# Patient Record
Sex: MALE | Race: BLACK OR AFRICAN AMERICAN | NOT HISPANIC OR LATINO | Employment: OTHER | URBAN - METROPOLITAN AREA
[De-identification: names, ages, dates, MRNs, and addresses within clinical notes are randomized per-mention and may not be internally consistent; named-entity substitution may affect disease eponyms.]

---

## 2021-12-15 ENCOUNTER — OFFICE VISIT (OUTPATIENT)
Dept: ENDOCRINOLOGY | Facility: CLINIC | Age: 61
End: 2021-12-15

## 2021-12-15 VITALS
WEIGHT: 302 LBS | HEIGHT: 72 IN | BODY MASS INDEX: 40.9 KG/M2 | SYSTOLIC BLOOD PRESSURE: 128 MMHG | HEART RATE: 78 BPM | DIASTOLIC BLOOD PRESSURE: 78 MMHG | OXYGEN SATURATION: 98 %

## 2021-12-15 DIAGNOSIS — E11.65 TYPE 2 DIABETES MELLITUS WITH HYPERGLYCEMIA, WITHOUT LONG-TERM CURRENT USE OF INSULIN (HCC): Primary | ICD-10-CM

## 2021-12-15 PROBLEM — I10 ESSENTIAL HYPERTENSION: Status: ACTIVE | Noted: 2017-04-28

## 2021-12-15 PROBLEM — F32.A DEPRESSIVE DISORDER: Status: ACTIVE | Noted: 2017-07-28

## 2021-12-15 PROBLEM — E66.01 MORBID OBESITY: Status: ACTIVE | Noted: 2018-04-13

## 2021-12-15 PROBLEM — G89.4 CHRONIC PAIN DISORDER: Status: ACTIVE | Noted: 2017-04-28

## 2021-12-15 LAB
EXPIRATION DATE: NORMAL
GLUCOSE BLDC GLUCOMTR-MCNC: 278 MG/DL (ref 70–130)
HBA1C MFR BLD: 7.8 %
Lab: NORMAL

## 2021-12-15 PROCEDURE — 82947 ASSAY GLUCOSE BLOOD QUANT: CPT | Performed by: INTERNAL MEDICINE

## 2021-12-15 PROCEDURE — 99204 OFFICE O/P NEW MOD 45 MIN: CPT | Performed by: INTERNAL MEDICINE

## 2021-12-15 PROCEDURE — 83036 HEMOGLOBIN GLYCOSYLATED A1C: CPT | Performed by: INTERNAL MEDICINE

## 2021-12-15 RX ORDER — CHOLECALCIFEROL (VITAMIN D3) 1250 MCG
CAPSULE ORAL
COMMUNITY

## 2021-12-15 RX ORDER — FLUTICASONE PROPIONATE 50 MCG
SPRAY, SUSPENSION (ML) NASAL
COMMUNITY

## 2021-12-15 RX ORDER — SEMAGLUTIDE 1.34 MG/ML
0.5 INJECTION, SOLUTION SUBCUTANEOUS WEEKLY
Qty: 1 PEN | Refills: 0
Start: 2021-12-15 | End: 2022-01-05 | Stop reason: SDUPTHER

## 2021-12-15 RX ORDER — METOPROLOL SUCCINATE 100 MG/1
TABLET, EXTENDED RELEASE ORAL
COMMUNITY

## 2021-12-15 RX ORDER — HYDRALAZINE HYDROCHLORIDE 25 MG/1
TABLET, FILM COATED ORAL
COMMUNITY

## 2021-12-15 RX ORDER — GLYBURIDE 5 MG/1
TABLET ORAL
COMMUNITY
End: 2021-12-15

## 2021-12-15 RX ORDER — CITALOPRAM 20 MG/1
TABLET ORAL
COMMUNITY

## 2021-12-15 RX ORDER — GABAPENTIN 400 MG/1
CAPSULE ORAL
COMMUNITY

## 2021-12-15 RX ORDER — LANCETS 33 GAUGE
EACH MISCELLANEOUS
COMMUNITY
Start: 2021-11-23 | End: 2023-03-15 | Stop reason: SDUPTHER

## 2021-12-15 RX ORDER — AMITRIPTYLINE HYDROCHLORIDE 25 MG/1
TABLET, FILM COATED ORAL
COMMUNITY
Start: 2021-12-04

## 2021-12-15 RX ORDER — OMEPRAZOLE 20 MG/1
20 CAPSULE, DELAYED RELEASE ORAL DAILY
COMMUNITY
End: 2022-09-13 | Stop reason: ALTCHOICE

## 2021-12-15 RX ORDER — CHLORAL HYDRATE 500 MG
CAPSULE ORAL
COMMUNITY

## 2021-12-15 RX ORDER — OXYBUTYNIN CHLORIDE 15 MG/1
15 TABLET, EXTENDED RELEASE ORAL DAILY
COMMUNITY

## 2021-12-15 RX ORDER — LOPERAMIDE HYDROCHLORIDE 2 MG/1
CAPSULE ORAL
COMMUNITY

## 2021-12-15 RX ORDER — DIPHENOXYLATE HYDROCHLORIDE AND ATROPINE SULFATE 2.5; .025 MG/1; MG/1
TABLET ORAL
COMMUNITY

## 2021-12-15 RX ORDER — AMLODIPINE BESYLATE 2.5 MG/1
TABLET ORAL
COMMUNITY
Start: 2021-10-24 | End: 2022-09-13

## 2021-12-15 RX ORDER — BLOOD SUGAR DIAGNOSTIC
STRIP MISCELLANEOUS
COMMUNITY
Start: 2021-11-30 | End: 2023-03-15 | Stop reason: SDUPTHER

## 2021-12-15 RX ORDER — CLONIDINE HYDROCHLORIDE 0.1 MG/1
0.1 TABLET ORAL 2 TIMES DAILY
COMMUNITY

## 2021-12-15 RX ORDER — METFORMIN HYDROCHLORIDE 500 MG/1
500 TABLET, EXTENDED RELEASE ORAL 2 TIMES DAILY WITH MEALS
Qty: 180 TABLET | Refills: 1 | Status: SHIPPED | OUTPATIENT
Start: 2021-12-15 | End: 2022-04-25 | Stop reason: SDUPTHER

## 2021-12-15 RX ORDER — FERROUS SULFATE 325(65) MG
TABLET ORAL
COMMUNITY
End: 2022-09-13

## 2021-12-15 NOTE — PROGRESS NOTES
Chief Complaint   Patient presents with   • Diabetes     New Consult       HPI:   Gaetano Marcelo is a 61 y.o.male sent in consultation by EMILIA Meadows for further evaluation and management of his Type 2 diabetes. His history is as follows:    1) Type 2 DM with h/o retinopathy, PVD, peripheral neuropathy:   - Diagnosed with diabetes at age 34  - s/p Michelle-en-y gastric in 2004. Required diabetic medications after surgery.   - Pt had been taking metformin for years. In 2013, was hospitalized with sepsis, LAURY due to gangrene of the right 1st, 2nd toes after procedure with podiatry. Pt required dialysis during that admission. Metformin was not restarted after renal function improved.   FMHx: Parents had DM    Current DM Medications:  - glipizide 5 mg BID AC    Glucometer Review:  Three readings: 9am 186, 2pm 193, 9 pm 100    DM Health Maintenance:  Ophtho: (12/2021) with Dr. Dumont ([facility]). H/O retinopathy in 2018, s/p photocoag. Stable.    Podiatry: see below  Monofilament / Foot exam: DUE  Lipids: (08/2021) Tchol 146, TG 89, HDL 68, LDL 60 - on atorvastatin 20 mg  Urine Microalb/Cr ratio: (08/2021) normal  TSH: N/A  CBC: (08/2021) Hgb 12.8 (13.8 - 17.5)  CMP: (08/2021) Cr 1.1, eGFR 71, AST 57, ALT 55  ASA: 81 mg daily    (08/2021) A1C% 6.4    Other History:  1) s/p right 1st, 2nd toe amputation in 2013: was hospitalized with sepsis, LAURY due to gangrene of the Right toes after procedure with podiatry. Pt required dialysis during that admission. 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 Has mild PTSD from that hospitalization. Takes Elavil qhs to help with sleep.     2) essential HTN: on amlodipine 2.5 mg QD, clonidine 0.2 mg BID, Hydralazine 25 mg BID, Isosorbide ER 30 mg daily (for angina), Lasix 40 mg daily (peripheral edema), Metoprolol  mg daily    3) chronic back pain: on ultracet, diclofenac PRN    Review of Systems   Constitutional: Positive for fatigue.   HENT:  Positive for dental problem.    Eyes: Negative.    Respiratory: Positive for shortness of breath (with exertion).    Cardiovascular: Negative.    Gastrointestinal: Negative.    Endocrine: Negative.         Denies hypoglycemia   Genitourinary: Negative.    Musculoskeletal: Positive for arthralgias and back pain.   Skin: Negative.    Allergic/Immunologic: Negative.    Neurological: Negative.    Hematological: Negative.    Psychiatric/Behavioral: Positive for sleep disturbance.       Past Medical History:   Diagnosis Date   • GERD (gastroesophageal reflux disease)    • Hyperlipidemia    • Hypertension    • Insomnia    • Low back pain    • Osteoarthritis    • Pain, phantom limb (HCC)    • Peripheral edema    • Type 2 diabetes mellitus (HCC)      family history includes Coronary artery disease in his father; Diabetes in his brother, father, mother, and paternal grandmother.  Past Surgical History:   Procedure Laterality Date   • APPENDECTOMY     • CARPAL TUNNEL RELEASE Left 08/18/2020    Dr. Yosef Diaz   • CHOLECYSTECTOMY     • HERNIA REPAIR     • REPLACEMENT TOTAL KNEE Right    • KANWAL-EN-Y PROCEDURE  2004   • TOE AMPUTATION Right 2013    1st, 2nd Rt toes   • ULNAR NERVE DECOMPRESSION Left 08/18/2020    Dr. Yosef Diaz     Social History     Tobacco Use   • Smoking status: Never Smoker   • Smokeless tobacco: Never Used   Substance Use Topics   • Alcohol use: Not Currently   • Drug use: Never     Outpatient Medications Prior to Visit   Medication Sig Dispense Refill   • 5-Hydroxytryptophan (5-HTP PO) 5-HTP     • amitriptyline (ELAVIL) 25 MG tablet      • amLODIPine (NORVASC) 2.5 MG tablet      • Aspirin Buf,CaCarb-MgCarb-MgO, 81 MG tablet aspirin 81 mg tablet   Daily     • atorvastatin (LIPITOR) 20 MG tablet Take 20 mg by mouth Daily.     • Bismuth Subgallate (DEVROM PO) Take  by mouth.     • Calcium Carbonate-Vit D-Min (CALCIUM 1200 PO) Take 1,200 mcg by mouth.     • Cholecalciferol (Vitamin D3) 1.25 MG (24541 UT)  "capsule Vitamin D3   Daily     • citalopram (CeleXA) 20 MG tablet citalopram 20 mg tablet     • cloNIDine (CATAPRES) 0.1 MG tablet Every 12 (Twelve) Hours.     • cyanocobalamin (VITAMIN B-12) 1000 MCG tablet Vitamin B-12  1,000 mcg tablet   TAKE 1 TABLET BY MOUTH ONCE DAILY     • ferrous sulfate 325 (65 FE) MG tablet ferrous sulfate 325 mg (65 mg iron) tablet   Take 1 tablet every day by oral route for 90 days.     • fluticasone (Flonase) 50 MCG/ACT nasal spray Flonase Allergy Relief 50 mcg/actuation nasal spray,suspension   2 sprays each nostril once daily     • gabapentin (NEURONTIN) 400 MG capsule gabapentin 400 mg capsule     • Ginkgo Biloba 120 MG capsule ginkgo biloba 40 mg capsule   daily     • hydrALAZINE (APRESOLINE) 25 MG tablet hydralazine 25 mg tablet     • L-Methylfolate-Algae-B12-B6 (METANX PO) Take  by mouth 2 (Two) Times a Day.     • Lancets (OneTouch Delica Plus [id number]) misc      • loperamide (IMODIUM) 2 MG capsule loperamide 2 mg capsule     • metoprolol succinate XL (TOPROL-XL) 100 MG 24 hr tablet metoprolol succinate  mg tablet,extended release 24 hr     • multivitamin (MULTIPLE VITAMIN PO) Multiple Vitamin capsule     • multivitamin (THERAGRAN) tablet tablet Super B Complex tablet     • Omega-3 Fatty Acids (fish oil) 1000 MG capsule capsule Take  by mouth Daily With Breakfast.     • omeprazole (priLOSEC) 20 MG capsule Take 20 mg by mouth Daily.     • OneTouch Verio test strip      • oxybutynin XL (DITROPAN XL) 15 MG 24 hr tablet Take 15 mg by mouth Daily.     • traMADol-acetaminophen (ULTRACET) 37.5-325 MG per tablet Every 6 (Six) Hours.     • glyburide (DIAbeta) 5 MG tablet glyburide 5 mg tablet       No facility-administered medications prior to visit.     Allergies   Allergen Reactions   • Hydrochlorothiazide Rash   • Penicillins Rash       /78   Pulse 78   Ht 182.9 cm (72\")   Wt (!) 137 kg (302 lb)   SpO2 98%   BMI 40.96 kg/m²   Physical Exam    LABS/IMAGING: outside " records reviewed and summarized in HPI  Results for orders placed or performed in visit on 12/15/21   POC Glucose, Blood    Specimen: Blood   Result Value Ref Range    Glucose 278 (A) 70 - 130 mg/dL   POC Glycosylated Hemoglobin (Hb A1C)    Specimen: Blood   Result Value Ref Range    Hemoglobin A1C 7.8 %    Lot Number [id number]     Expiration Date 06/28/23        ASSESSMENT/PLAN:    1) Type 2 DM with h/o retinopathy, PVD, peripheral neuropathy: uncontrolled with hyperglycemia, Hgb A1C% today is mildly elevated at 7.8%  -  I counseled pt on potential microvascular and macrovascular complications from uncontrolled diabetes; the significant positive effect carb consistent meal planning and modest weight loss can have on glycemic control; blood glucose goals for complication prevention; and mechanism of action of various diabetic medications.    - Discussed treatment options. Discussed the limitations of glipizide/glyburide and the advantages of GLP-1 agonists. Nanci discussed that based in his current GFR of 71, metformin can be used. Discussed that metformin would be beneficial as an insulin sensitizer.    - Will D/C glipizide  - Will start metformin  mg: pt advised to take 1 tablet daily with dinner for 2 weeks , then increase to 1 tab BID with food.  - Will start Ozempic: pt to take 0.25 mg weekly x 8 weeks, then increase to 0.5 mg weekly. Sample pens given to patient. Pt to call for Rx if tolerated. (If not tolerated, another GLP-1 will be tried.)  - Written instructions reviewed with patient.     Instructed pt to check blood glucose pre-meal three times a week, varying the meal each check. Example: Mon: pre- BK and pre-lunch, Wed: pre-lunch and predinner, Fri: pre-dinner and bedtime     RTC 04/25/2022    Signed: Genie Talbot MD    Counseling was given to patient for the following topics:  instructions for management, impressions and risks and benefits of treatment options, see details in assessment/plan.  Total face to face time of the encounter was 60 minutes and 45 minutes was spent counseling.

## 2021-12-29 PROBLEM — E11.8 TYPE 2 DIABETES MELLITUS WITH COMPLICATION, WITHOUT LONG-TERM CURRENT USE OF INSULIN: Status: ACTIVE | Noted: 2021-12-15

## 2021-12-29 RX ORDER — ATORVASTATIN CALCIUM 20 MG/1
20 TABLET, FILM COATED ORAL DAILY
COMMUNITY

## 2022-01-05 DIAGNOSIS — E11.65 TYPE 2 DIABETES MELLITUS WITH HYPERGLYCEMIA, WITHOUT LONG-TERM CURRENT USE OF INSULIN: ICD-10-CM

## 2022-01-05 RX ORDER — SEMAGLUTIDE 1.34 MG/ML
0.5 INJECTION, SOLUTION SUBCUTANEOUS WEEKLY
Qty: 4.5 ML | Refills: 3 | Status: SHIPPED | OUTPATIENT
Start: 2022-01-05 | End: 2022-04-25 | Stop reason: DRUGHIGH

## 2022-01-17 ENCOUNTER — TELEPHONE (OUTPATIENT)
Dept: ENDOCRINOLOGY | Facility: CLINIC | Age: 62
End: 2022-01-17

## 2022-01-17 NOTE — TELEPHONE ENCOUNTER
Spoke to patient about lab glucose readings. Instructed pt that glucoses will improve once he is on the 0.5 mg dose of the Ozempic.   Signed: Genie Talbot MD

## 2022-01-17 NOTE — TELEPHONE ENCOUNTER
Pt called states his blood sugars have been high    01/09/22 morning 151 evening 219  01/10/22 morning 158 evening 190  01/11/22 morning 245 evening 269  01/12/22 no readings  01/13/22 evening 189  01/14/22 evening 228   01/15/22 no readings  01/16/22 no readings    01/17/22 151 morning pt has been on Metformin 500 mg since 12/15/21 and started with Ozempic 0.25 or 0.5mg 2mg/1.5 mL solution pen  on 01/05/22. Pt states he blood sugars usually run around 135 before changes was made on his medication. Pt last seen 01/05/22 pt next appt 04/25/22.Please contact pt

## 2022-04-25 ENCOUNTER — OFFICE VISIT (OUTPATIENT)
Dept: ENDOCRINOLOGY | Facility: CLINIC | Age: 62
End: 2022-04-25

## 2022-04-25 VITALS
HEIGHT: 72 IN | WEIGHT: 277 LBS | BODY MASS INDEX: 37.52 KG/M2 | HEART RATE: 83 BPM | DIASTOLIC BLOOD PRESSURE: 66 MMHG | OXYGEN SATURATION: 96 % | SYSTOLIC BLOOD PRESSURE: 108 MMHG

## 2022-04-25 DIAGNOSIS — E11.65 TYPE 2 DIABETES MELLITUS WITH HYPERGLYCEMIA, WITHOUT LONG-TERM CURRENT USE OF INSULIN: Primary | ICD-10-CM

## 2022-04-25 LAB
EXPIRATION DATE: NORMAL
HBA1C MFR BLD: 7.1 %
HBA1C MFR BLD: 8 %
Lab: NORMAL

## 2022-04-25 PROCEDURE — 83036 HEMOGLOBIN GLYCOSYLATED A1C: CPT | Performed by: INTERNAL MEDICINE

## 2022-04-25 PROCEDURE — 99213 OFFICE O/P EST LOW 20 MIN: CPT | Performed by: INTERNAL MEDICINE

## 2022-04-25 RX ORDER — METFORMIN HYDROCHLORIDE 500 MG/1
1000 TABLET, EXTENDED RELEASE ORAL 2 TIMES DAILY WITH MEALS
Qty: 360 TABLET | Refills: 3 | Status: SHIPPED | OUTPATIENT
Start: 2022-04-25 | End: 2022-05-31

## 2022-04-25 RX ORDER — SEMAGLUTIDE 1.34 MG/ML
1 INJECTION, SOLUTION SUBCUTANEOUS WEEKLY
Qty: 3 ML | Refills: 0
Start: 2022-04-25 | End: 2022-05-13 | Stop reason: SDUPTHER

## 2022-04-30 NOTE — PROGRESS NOTES
Chief Complaint   Patient presents with   • Diabetes     Follow up       HPI:   Gaetano Marcelo is a 62 y.o.male who [facility] for f/u evaluation and management of his Type 2 diabetes. Last visit 12/15/2021. His history is as follows:    Interim Events:  - Had URI in early March. Hgb A1C% at PCP's office at time of illness was 8.0% (03/15/2022)  - Labs completed at PCP's office in 03/2022, summarized below.  - Has lost 25 lbs since last visit.    1) Type 2 DM with h/o retinopathy, PVD, peripheral neuropathy:   - Diagnosed with diabetes at age 34  - s/p Michelle-en-y gastric in 2004. Required diabetic medications after surgery.   - Pt had been taking metformin for years. In 2013, was hospitalized with sepsis, LAURY due to gangrene of the right 1st, 2nd toes after procedure with podiatry. Pt required dialysis during that admission. Metformin was not restarted after renal function improved.   FMHx: Parents had DM    Current DM Medications: Pt tolerating DM medications  - Metformin  mg tabs: 1 tab (500 mg) BID with food  - Ozempic: 0.5 mg weekly    Glucometer Review:  Pre-meal BGs ranging 140's - 160's throughout the day    DM Health Maintenance:  Ophtho: (12/2021) with Dr. Dumont ([facility]). H/O retinopathy in 2018, s/p photocoag. Stable.    Podiatry: see below  Monofilament / Foot exam: DUE  Lipids: (03/2022) Tchol 127, TG 97, HDL 56, LDL 52 - on atorvastatin 20 mg  Urine Microalb/Cr ratio: (03/2022) collected at PCP office, not ratio. Random urine microalbumin 3.95 (0.60 - 1.60)  TSH: (03/2022) 0.85   CBC: (03/2022) Hgb 13.4 (13.8 - 17.5)  CMP: (03/2022) Cr 0.9, eGFR 87, AST 53, ALT 40  Vit B12: (03/2022) > 1000  ASA: 81 mg daily, prescribed by another provider      Other History:  1) s/p right 1st, 2nd toe amputation in 2013: was hospitalized with sepsis, LAURY due to gangrene of the Right toes after procedure with podiatry. Pt required dialysis during that admission. Is on gabapentin 400 mg  "BID for phantom pain in the right foot. Has mild PTSD from that hospitalization. Takes Elavil qhs to help with sleep.     2) essential HTN: on amlodipine 2.5 mg QD, clonidine 0.1 mg BID, Hydralazine 25 mg BID, Isosorbide ER 30 mg daily (for angina), Metoprolol  mg daily    3) chronic back pain: on ultracet, diclofenac PRN    Review of Systems   Constitutional: Positive for fatigue (intermittent).        Wt loss   HENT: Positive for dental problem.    Eyes: Negative.    Respiratory: Positive for shortness of breath (with exertion).    Cardiovascular: Negative.    Gastrointestinal: Negative.    Endocrine: Negative.         Denies hypoglycemia   Genitourinary: Negative.    Musculoskeletal: Positive for arthralgias and back pain.   Skin: Negative.    Allergic/Immunologic: Negative.    Neurological: Negative.    Hematological: Negative.    Psychiatric/Behavioral: Positive for sleep disturbance.       The following portions of the patient's history were reviewed and updated as appropriate: allergies, current medications, past family history, past medical history, past social history, past surgical history and problem list.      /66   Pulse 83   Ht 182.9 cm (72\")   Wt 126 kg (277 lb)   SpO2 96%   BMI 37.57 kg/m²   Physical Exam  Vitals reviewed.   Constitutional:       General: He is not in acute distress.     Appearance: He is well-developed. He is obese. He is not ill-appearing or diaphoretic.   HENT:      Head: Normocephalic.   Eyes:      Conjunctiva/sclera: Conjunctivae normal.      Pupils: Pupils are equal, round, and reactive to light.   Neck:      Thyroid: No thyromegaly.      Trachea: No tracheal deviation.      Comments: No palpable thyroid nodules    Cardiovascular:      Rate and Rhythm: Normal rate and regular rhythm.      Heart sounds: Normal heart sounds. No murmur heard.  Pulmonary:      Effort: Pulmonary effort is normal. No respiratory distress.      Breath sounds: Normal breath sounds. "   Abdominal:      General: Bowel sounds are normal.      Palpations: Abdomen is soft. There is no mass.      Tenderness: There is no abdominal tenderness.      Comments: No scarring at GLP-1 injection sites     Musculoskeletal:      Cervical back: Neck supple.   Lymphadenopathy:      Cervical: No cervical adenopathy.   Skin:     General: Skin is warm and dry.      Findings: No erythema.   Neurological:      Mental Status: He is alert and oriented to person, place, and time.      Cranial Nerves: No cranial nerve deficit.   Psychiatric:         Behavior: Behavior normal.         LABS/IMAGING: outside records reviewed and summarized in HPI  Results for orders placed or performed in visit on 04/25/22   Hemoglobin A1c    Specimen: Blood   Result Value Ref Range    External Hemoglobin A1C 8.0    POC Glycosylated Hemoglobin (Hb A1C)    Specimen: Blood   Result Value Ref Range    Hemoglobin A1C 7.1 %    Lot Number [id number]     Expiration Date 12/10/23        ASSESSMENT/PLAN:    1) Type 2 DM with h/o retinopathy, PVD, peripheral neuropathy: uncontrolled with mild hyperglycemia, Hgb A1C% however today is 7.1%. Discussed with patient that his recent elevated A1C% of 8.0 was likely related to illness.     Will make the following changes to his regimen:  - Increase metformin ER to 1000 mg BID  - Increase Ozempic to 1 mg weekly. Pt to call if higher dose is not tolerated    Instructed pt to check blood glucose pre-meal three times a week, varying the meal each check. Example: Mon: pre- BK and pre-lunch, Wed: pre-lunch and predinner, Fri: pre-dinner and bedtime     RTC 4 months    Signed: Genie Talbot MD

## 2022-05-13 DIAGNOSIS — E11.65 TYPE 2 DIABETES MELLITUS WITH HYPERGLYCEMIA, WITHOUT LONG-TERM CURRENT USE OF INSULIN: ICD-10-CM

## 2022-05-13 RX ORDER — SEMAGLUTIDE 1.34 MG/ML
1 INJECTION, SOLUTION SUBCUTANEOUS WEEKLY
Qty: 3 ML | Refills: 0
Start: 2022-05-13 | End: 2022-05-19 | Stop reason: SDUPTHER

## 2022-05-13 NOTE — TELEPHONE ENCOUNTER
PATIENT NEEDS US TO CALL IN NEW PRESCRIPTION FOR OZEMPIC WITH DOSAGE CHANGE AS WE RAISED THE DOSAGE ON IT. HE IS RUNNING OUT SOONER DUE TO DOSAGE CHANGE. PHONE NUMBER [phone]

## 2022-05-13 NOTE — TELEPHONE ENCOUNTER
Spoke with wife. He would like script called in while he waits for pt asst. His pt asst did get approved.

## 2022-05-19 RX ORDER — SEMAGLUTIDE 1.34 MG/ML
1 INJECTION, SOLUTION SUBCUTANEOUS WEEKLY
Qty: 3 ML | Refills: 0 | Status: SHIPPED | OUTPATIENT
Start: 2022-05-19 | End: 2022-05-19 | Stop reason: SDUPTHER

## 2022-05-19 RX ORDER — SEMAGLUTIDE 1.34 MG/ML
1 INJECTION, SOLUTION SUBCUTANEOUS WEEKLY
Qty: 3 ML | Refills: 0 | Status: SHIPPED | OUTPATIENT
Start: 2022-05-19 | End: 2023-03-14 | Stop reason: SDUPTHER

## 2022-05-31 DIAGNOSIS — E11.65 TYPE 2 DIABETES MELLITUS WITH HYPERGLYCEMIA, WITHOUT LONG-TERM CURRENT USE OF INSULIN: ICD-10-CM

## 2022-05-31 RX ORDER — METFORMIN HYDROCHLORIDE 500 MG/1
1000 TABLET, EXTENDED RELEASE ORAL 2 TIMES DAILY
Qty: 360 TABLET | Refills: 3 | Status: SHIPPED | OUTPATIENT
Start: 2022-05-31 | End: 2023-03-14 | Stop reason: SDUPTHER

## 2022-05-31 NOTE — TELEPHONE ENCOUNTER
Pharmacy requesting refill  Last seen: 4/25/22  Next appt: 9/13/22  Was increase to 1000mg bid, per last note

## 2022-06-02 ENCOUNTER — TELEPHONE (OUTPATIENT)
Dept: ENDOCRINOLOGY | Facility: CLINIC | Age: 62
End: 2022-06-02

## 2022-06-14 DIAGNOSIS — E11.65 TYPE 2 DIABETES MELLITUS WITH HYPERGLYCEMIA, WITHOUT LONG-TERM CURRENT USE OF INSULIN: ICD-10-CM

## 2022-06-15 RX ORDER — SEMAGLUTIDE 1.34 MG/ML
INJECTION, SOLUTION SUBCUTANEOUS
Qty: 3 ML | Refills: 0 | OUTPATIENT
Start: 2022-06-15

## 2022-08-11 ENCOUNTER — TELEPHONE (OUTPATIENT)
Dept: ENDOCRINOLOGY | Facility: CLINIC | Age: 62
End: 2022-08-11

## 2022-09-13 ENCOUNTER — OFFICE VISIT (OUTPATIENT)
Dept: ENDOCRINOLOGY | Facility: CLINIC | Age: 62
End: 2022-09-13

## 2022-09-13 VITALS
HEART RATE: 98 BPM | DIASTOLIC BLOOD PRESSURE: 78 MMHG | OXYGEN SATURATION: 98 % | BODY MASS INDEX: 34.67 KG/M2 | WEIGHT: 256 LBS | SYSTOLIC BLOOD PRESSURE: 128 MMHG | HEIGHT: 72 IN

## 2022-09-13 DIAGNOSIS — E11.8 TYPE 2 DIABETES MELLITUS WITH COMPLICATION, WITHOUT LONG-TERM CURRENT USE OF INSULIN: Primary | ICD-10-CM

## 2022-09-13 LAB
EXPIRATION DATE: NORMAL
GLUCOSE BLDC GLUCOMTR-MCNC: 197 MG/DL (ref 70–130)
HBA1C MFR BLD: 5.7 %
Lab: NORMAL

## 2022-09-13 PROCEDURE — 82947 ASSAY GLUCOSE BLOOD QUANT: CPT | Performed by: INTERNAL MEDICINE

## 2022-09-13 PROCEDURE — 99213 OFFICE O/P EST LOW 20 MIN: CPT | Performed by: INTERNAL MEDICINE

## 2022-09-13 PROCEDURE — 83036 HEMOGLOBIN GLYCOSYLATED A1C: CPT | Performed by: INTERNAL MEDICINE

## 2022-09-13 RX ORDER — METHOCARBAMOL 750 MG/1
TABLET, FILM COATED ORAL
COMMUNITY
Start: 2022-08-30

## 2022-09-13 NOTE — PROGRESS NOTES
Chief Complaint   Patient presents with   • Diabetes     Follow up       HPI:   Gaetano Marcelo is a 62 y.o.male who [facility] to [facility] for f/u evaluation and management of his Type 2 diabetes. Last visit 04/25/2022. His history is as follows:    Interim Events:  - Has been in good health  - Has lost 46 lbs since 12/2021  - Pt reports he has tolerated his DM medications  - pt had labs recently completed at PCP's office    1) Type 2 DM with h/o retinopathy, PVD, peripheral neuropathy:   - Diagnosed with diabetes at age 34  - s/p Michelle-en-y gastric in 2004. Required diabetic medications after surgery.   - Pt had been taking metformin for years. In 2013, was hospitalized with sepsis, LAURY due to gangrene of the right 1st, 2nd toes after procedure with podiatry. Pt required dialysis during that admission. Metformin was not restarted after renal function improved.   FMHx: Parents had DM    Current DM Medications: Pt tolerating DM medications  - Metformin  mg tabs: 2 tabs (1000 mg) BID with food  - Ozempic: 1.0 mg weekly (getting through [employer] PAP)    Glucometer Review:  Pre-meal BGs ranging 140's - 160's throughout the day    DM Health Maintenance:  Ophtho: (12/2021) with Dr. Dumont ([facility]). H/O retinopathy in 2018, s/p photocoag. Stable.    Podiatry: see below  Monofilament / Foot exam: DUE  Lipids: (03/2022) Tchol 127, TG 97, HDL 56, LDL 52 - on atorvastatin 20 mg  Urine Microalb/Cr ratio: (09/2022) normal  TSH: (03/2022) 0.85   CBC: (09/2022) Hgb 13.2 (13.8 - 17.5)  CMP: (03/2022) Cr 0.9, eGFR 87, AST 53, ALT 40  Renal Panel: (09/2022) Cr 1.2, eGFR 68  Vit B12: (03/2022) > 1000  ASA: 81 mg daily, prescribed by another provider    Other History:  1) s/p right 1st, 2nd toe amputation in 2013: was hospitalized with sepsis, LAURY due to gangrene of the Right toes after procedure with podiatry. Pt required dialysis during that admission. Is on gabapentin 400 mg BID for phantom pain in the right  "foot. Has mild PTSD from that hospitalization. Takes Elavil qhs to help with sleep.     2) essential HTN: on clonidine 0.1 mg BID, Hydralazine 25 mg BID, Isosorbide ER 30 mg daily (for angina), Metoprolol  mg daily    3) chronic back pain: on ultracet, diclofenac PRN    Review of Systems   Constitutional: Positive for fatigue (intermittent).        Wt loss   HENT: Negative.  Negative for dental problem.    Eyes: Negative.    Respiratory: Positive for shortness of breath (with exertion).    Cardiovascular: Negative.    Gastrointestinal: Negative.    Endocrine: Negative.         Denies hypoglycemia   Genitourinary: Negative.    Musculoskeletal: Positive for arthralgias and back pain.   Skin: Negative.    Allergic/Immunologic: Negative.    Neurological: Negative.    Hematological: Negative.    Psychiatric/Behavioral: Positive for sleep disturbance.       The following portions of the patient's history were reviewed and updated as appropriate: allergies, current medications, past family history, past medical history, past social history, past surgical history and problem list.      /78   Pulse 98   Ht 182.9 cm (72\")   Wt 116 kg (256 lb)   SpO2 98%   BMI 34.72 kg/m²   Physical Exam  Vitals reviewed.   Constitutional:       General: He is not in acute distress.     Appearance: He is well-developed. He is obese. He is not ill-appearing or diaphoretic.   HENT:      Head: Normocephalic.      Mouth/Throat:      Mouth: Mucous membranes are moist.      Pharynx: Oropharynx is clear.   Eyes:      Conjunctiva/sclera: Conjunctivae normal.      Pupils: Pupils are equal, round, and reactive to light.   Neck:      Thyroid: No thyromegaly.      Trachea: No tracheal deviation.      Comments: No palpable thyroid nodules    Cardiovascular:      Rate and Rhythm: Normal rate and regular rhythm.      Pulses:           Dorsalis pedis pulses are 1+ on the right side and 1+ on the left side.        Posterior tibial pulses are " 1+ on the right side and 1+ on the left side.      Heart sounds: Normal heart sounds. No murmur heard.  Pulmonary:      Effort: Pulmonary effort is normal. No respiratory distress.      Breath sounds: Normal breath sounds.   Abdominal:      General: Bowel sounds are normal.      Palpations: Abdomen is soft. There is no mass.      Tenderness: There is no abdominal tenderness.      Comments: No scarring at GLP-1 injection sites     Musculoskeletal:      Cervical back: Neck supple.      Right foot: Normal range of motion.      Left foot: Normal range of motion.      Right Lower Extremity: (s/p amputation of First 2 toes)  Feet:      Right foot:      Protective Sensation: 5 sites tested. 4 sites sensed.      Skin integrity: Callus and dry skin present.      Left foot:      Protective Sensation: 5 sites tested. 4 sites sensed.      Skin integrity: Callus and dry skin present.   Lymphadenopathy:      Cervical: No cervical adenopathy.   Skin:     General: Skin is warm and dry.      Findings: No erythema.   Neurological:      Mental Status: He is alert and oriented to person, place, and time.      Cranial Nerves: No cranial nerve deficit.   Psychiatric:         Behavior: Behavior normal.         LABS/IMAGING: outside records reviewed and summarized in HPI  Results for orders placed or performed in visit on 09/13/22   POC Glycosylated Hemoglobin (Hb A1C)    Specimen: Blood   Result Value Ref Range    Hemoglobin A1C 5.7 %    Lot Number [id number]     Expiration Date 04/03/24    POC Glucose, Blood    Specimen: Blood   Result Value Ref Range    Glucose 197 (A) 70 - 130 mg/dL       ASSESSMENT/PLAN:    1) Type 2 DM with h/o retinopathy, PVD, peripheral neuropathy: controlled, Hgb A1C% today is 5.7, without reports of hypoglycemia. Pt has lost 46 lbs since 12/2021.    - Continue metformin ER 1000 mg BID  - Continue Ozempic to 1 mg weekly. (getting through [employer] PAP)    Instructed pt to check blood glucose pre-meal three  times a week, varying the meal each check. Example: Mon: pre- BK and pre-lunch, Wed: pre-lunch and predinner, Fri: pre-dinner and bedtime    Advised pt to contact me if he has any GI symptoms or if he develops hypoglycemia (BG < 80)    Will obtain copy of recent labs from PCP's office. ADDENDUM: Outside labs were available for review after visit and summarized in HPI.     RTC 5 months    Signed: Genie Talbot MD

## 2022-11-16 ENCOUNTER — DOCUMENTATION (OUTPATIENT)
Dept: ENDOCRINOLOGY | Facility: CLINIC | Age: 62
End: 2022-11-16

## 2022-11-16 ENCOUNTER — TELEPHONE (OUTPATIENT)
Dept: ENDOCRINOLOGY | Facility: CLINIC | Age: 62
End: 2022-11-16

## 2022-11-16 NOTE — TELEPHONE ENCOUNTER
I called and spoke to the patient and informed them about the patient about the patient assistance

## 2023-02-28 ENCOUNTER — TELEPHONE (OUTPATIENT)
Dept: ENDOCRINOLOGY | Facility: CLINIC | Age: 63
End: 2023-02-28
Payer: COMMERCIAL

## 2023-02-28 NOTE — TELEPHONE ENCOUNTER
Patients wife called stated they received a letter about their patient assistance forms missing some information for his ozempic rx. Please advise.

## 2023-03-14 ENCOUNTER — OFFICE VISIT (OUTPATIENT)
Dept: ENDOCRINOLOGY | Facility: CLINIC | Age: 63
End: 2023-03-14
Payer: MEDICARE

## 2023-03-14 VITALS
BODY MASS INDEX: 33.86 KG/M2 | HEART RATE: 82 BPM | WEIGHT: 250 LBS | SYSTOLIC BLOOD PRESSURE: 130 MMHG | HEIGHT: 72 IN | DIASTOLIC BLOOD PRESSURE: 70 MMHG | OXYGEN SATURATION: 98 %

## 2023-03-14 DIAGNOSIS — E11.8 TYPE 2 DIABETES MELLITUS WITH COMPLICATION, WITHOUT LONG-TERM CURRENT USE OF INSULIN: Primary | ICD-10-CM

## 2023-03-14 LAB
EXPIRATION DATE: NORMAL
GLUCOSE BLDC GLUCOMTR-MCNC: 87 MG/DL (ref 70–130)
HBA1C MFR BLD: 5.4 %
Lab: NORMAL

## 2023-03-14 PROCEDURE — 99213 OFFICE O/P EST LOW 20 MIN: CPT | Performed by: INTERNAL MEDICINE

## 2023-03-14 PROCEDURE — 82947 ASSAY GLUCOSE BLOOD QUANT: CPT | Performed by: INTERNAL MEDICINE

## 2023-03-14 PROCEDURE — 3075F SYST BP GE 130 - 139MM HG: CPT | Performed by: INTERNAL MEDICINE

## 2023-03-14 PROCEDURE — 83036 HEMOGLOBIN GLYCOSYLATED A1C: CPT | Performed by: INTERNAL MEDICINE

## 2023-03-14 PROCEDURE — 3078F DIAST BP <80 MM HG: CPT | Performed by: INTERNAL MEDICINE

## 2023-03-14 PROCEDURE — 3044F HG A1C LEVEL LT 7.0%: CPT | Performed by: INTERNAL MEDICINE

## 2023-03-14 RX ORDER — METFORMIN HYDROCHLORIDE 500 MG/1
500 TABLET, EXTENDED RELEASE ORAL 2 TIMES DAILY
Qty: 180 TABLET | Refills: 3 | Status: SHIPPED | OUTPATIENT
Start: 2023-03-14

## 2023-03-14 RX ORDER — TROSPIUM CHLORIDE 20 MG/1
TABLET, FILM COATED ORAL
COMMUNITY
Start: 2023-01-17

## 2023-03-14 RX ORDER — SEMAGLUTIDE 1.34 MG/ML
1 INJECTION, SOLUTION SUBCUTANEOUS WEEKLY
Qty: 9 ML | Refills: 3 | Status: SHIPPED | OUTPATIENT
Start: 2023-03-14

## 2023-03-14 RX ORDER — LEVOMEFOLATE/B6/B12/ALGAL OIL 3-35-2 MG
CAPSULE ORAL
COMMUNITY
Start: 2023-01-24

## 2023-03-14 RX ORDER — TEMAZEPAM 15 MG/1
CAPSULE ORAL
COMMUNITY
Start: 2022-12-28

## 2023-03-14 NOTE — PROGRESS NOTES
Chief Complaint   Patient presents with   • Diabetes     Follow up        HPI:   Gaetano Marcelo is a 62 y.o.male who [facility] for f/u evaluation and management of his Type 2 diabetes. Last visit 09/13/2022. His history is as follows:    Interim Events:  - Has a non-healing ulceration on his left 3rd toe. Will be having a vascular procedure tomorrow.  - Has lost 52 lbs since 12/2021  - Pt reports he has tolerated his DM medications    1) Type 2 DM with h/o retinopathy, PVD, peripheral neuropathy:   - Diagnosed with diabetes at age 34  - s/p Michelle-en-y gastric in 2004. Required diabetic medications after surgery.   - Pt had been taking metformin for years. In 2013, was hospitalized with sepsis, LAURY due to gangrene of the right 1st, 2nd toes after procedure with podiatry. Pt required dialysis during that admission. Metformin was not restarted after renal function improved.   FMHx: Parents had DM    Current DM Medications: Pt tolerating DM medications  - Metformin  mg tabs: 2 tabs (1000 mg) BID with food  - Ozempic: 1.0 mg weekly (getting through [employer] P[facility])    Glucometer Review:  No meter for review today    DM Health Maintenance:  Ophtho: (03/20/2023) with Dr. Dumont ([facility]). H/O retinopathy in 2018, s/p photocoag. Stable.    Podiatry: see below  Monofilament / Foot exam: (09/13/2022)  Lipids: (03/2022) Tchol 127, TG 97, HDL 56, LDL 52 - on atorvastatin 20 mg  Urine Microalb/Cr ratio: (09/2022) normal  TSH: (03/2022) 0.85   CBC: (09/2022) Hgb 13.2 (13.8 - 17.5)  CMP: (03/2022) Cr 0.9, eGFR 87, AST 53, ALT 40  Renal Panel: (09/2022) Cr 1.2, eGFR 68  Vit B12: (03/2022) > 1000  ASA: 81 mg daily, prescribed by another provider    Other History:  1) s/p right 1st, 2nd toe amputation in 2013: was hospitalized with sepsis, LAURY due to gangrene of the Right toes after procedure with podiatry. Pt required dialysis during that admission. Is on gabapentin 400 mg BID for phantom pain in the  "right foot. Has mild PTSD from that hospitalization. Takes Elavil qhs to help with sleep.     2) essential HTN: on clonidine 0.1 mg BID, Hydralazine 25 mg BID, Isosorbide ER 30 mg daily (for angina), Metoprolol  mg daily    3) chronic back pain: on ultracet, diclofenac PRN    Review of Systems   Constitutional: Positive for fatigue (intermittent).        Wt loss   HENT: Negative.  Negative for dental problem.    Eyes: Negative.    Respiratory: Positive for shortness of breath (with exertion).    Cardiovascular: Negative.    Gastrointestinal: Negative.    Endocrine: Negative.         Denies hypoglycemia   Genitourinary: Negative.    Musculoskeletal: Positive for arthralgias and back pain.   Skin: Positive for wound (third left toe).   Allergic/Immunologic: Negative.    Neurological: Negative.    Hematological: Negative.    Psychiatric/Behavioral: Positive for sleep disturbance.     The following portions of the patient's history were reviewed and updated as appropriate: allergies, current medications, past family history, past medical history, past social history, past surgical history and problem list.      /70   Pulse 82   Ht 182.9 cm (72\")   Wt 113 kg (250 lb)   SpO2 98%   BMI 33.91 kg/m²   Physical Exam  Vitals reviewed.   Constitutional:       General: He is not in acute distress.     Appearance: Normal appearance. He is well-developed. He is not ill-appearing or diaphoretic.      Comments: BMI 33.91   HENT:      Head: Normocephalic.      Mouth/Throat:      Mouth: Mucous membranes are moist.      Pharynx: Oropharynx is clear.   Eyes:      Conjunctiva/sclera: Conjunctivae normal.      Pupils: Pupils are equal, round, and reactive to light.   Neck:      Thyroid: No thyromegaly.      Trachea: No tracheal deviation.      Comments: No palpable thyroid nodules    Cardiovascular:      Rate and Rhythm: Normal rate and regular rhythm.      Heart sounds: Normal heart sounds. No murmur heard.  Pulmonary: "      Effort: Pulmonary effort is normal. No respiratory distress.      Breath sounds: Normal breath sounds.   Abdominal:      General: Bowel sounds are normal.      Palpations: Abdomen is soft. There is no mass.      Tenderness: There is no abdominal tenderness.      Comments: No scarring at GLP-1 injection sites     Musculoskeletal:      Cervical back: Neck supple.      Right lower leg: Edema present.      Left lower leg: Edema present.      Comments: Left foot in boot      Right Lower Extremity: (s/p amputation of First 2 toes)  Lymphadenopathy:      Cervical: No cervical adenopathy.   Skin:     General: Skin is warm and dry.      Findings: No erythema.   Neurological:      Mental Status: He is alert and oriented to person, place, and time.      Cranial Nerves: No cranial nerve deficit.   Psychiatric:         Behavior: Behavior normal.       LABS/IMAGING: outside records reviewed and summarized in HPI  Results for orders placed or performed in visit on 03/14/23   POC Glycosylated Hemoglobin (Hb A1C)    Specimen: Blood   Result Value Ref Range    Hemoglobin A1C 5.4 %    Lot Number [id number]     Expiration Date 10/17/24    POC Glucose, Blood    Specimen: Blood   Result Value Ref Range    Glucose 87 70 - 130 mg/dL       ASSESSMENT/PLAN:    1) Type 2 DM with h/o retinopathy, PVD, peripheral neuropathy: controlled, Hgb A1C% today is 5.4, without reports of hypoglycemia. Pt has lost 52 lbs since 12/2021.    - Decrease metformin ER to 500 mg BID  - Continue Ozempic to 1 mg weekly. (getting through [email or web address] PAP)    Instructed pt to check blood glucose pre-meal three times a week, varying the meal each check. Example: Mon: pre- BK and pre-lunch, Wed: pre-lunch and predinner, Fri: pre-dinner and bedtime    Advised pt to contact me if he has any GI symptoms or if he develops hypoglycemia (BG < 80)    DM health maintenance lab due next visit if not already completed at PCP's office     RTC 5-6 months, will try to  coordinate with his wife's next f/u appointment    Signed: Genie Talbot MD

## 2023-03-15 RX ORDER — LANCETS 33 GAUGE
EACH MISCELLANEOUS
Status: CANCELLED | OUTPATIENT
Start: 2023-03-15

## 2023-03-15 RX ORDER — BLOOD SUGAR DIAGNOSTIC
STRIP MISCELLANEOUS
Status: CANCELLED | OUTPATIENT
Start: 2023-03-15

## 2023-03-15 RX ORDER — LANCETS 33 GAUGE
1 EACH MISCELLANEOUS 3 TIMES DAILY
Qty: 300 EACH | Refills: 3 | Status: SHIPPED | OUTPATIENT
Start: 2023-03-15

## 2023-03-15 RX ORDER — BLOOD SUGAR DIAGNOSTIC
STRIP MISCELLANEOUS
Qty: 300 EACH | Refills: 3 | Status: SHIPPED | OUTPATIENT
Start: 2023-03-15

## 2023-03-16 ENCOUNTER — TELEPHONE (OUTPATIENT)
Dept: ENDOCRINOLOGY | Facility: CLINIC | Age: 63
End: 2023-03-16
Payer: MEDICARE

## 2023-03-16 NOTE — TELEPHONE ENCOUNTER
Pt called states his One touch verio test strip and one touch delica plus lancets 33g  With pt old insurance he was getting free threw [facility]. I told pt to contact medicare to see if there is a pharmacy where he can get for free. Pt also asked if we have samples of Ozempic 1mg. Pt has signed up for patient assistance paperwork scanned in his chart for 02/07/23. Pt last f/u 03/14/23 pt has no f/u appt scheduled

## 2023-03-16 NOTE — TELEPHONE ENCOUNTER
PT CALLED REQUESTING WE CALL  [employer] @ PHONE # [phone] TO GET HIS TEST STRIPS AND LANCETS COVERED AND MAILED TO HIM. THEY ARE NOT COVERED BY [employer].     PT ALSO STATED OZEMPIC IS TOO EXPENSIVE. HE STATED HE IS ON PT ASSIATNCE FOR OZEMPIC. HE REQUESTED WE LOOK INTO THIS.

## 2023-03-21 ENCOUNTER — TELEPHONE (OUTPATIENT)
Dept: ENDOCRINOLOGY | Facility: CLINIC | Age: 63
End: 2023-03-21
Payer: MEDICARE

## 2023-03-21 RX ORDER — LANCETS 33 GAUGE
1 EACH MISCELLANEOUS 3 TIMES DAILY
Qty: 100 EACH | Refills: 3 | Status: SHIPPED | OUTPATIENT
Start: 2023-03-21

## 2023-03-21 NOTE — TELEPHONE ENCOUNTER
PATIENT CALLED TO REQUEST AN RX FOR A NEW GLUCOMETER WITH TEST STRIPS AND LANCETS. HE HAS A NEW INSURANCE PLAN AND DOESN'T BELIEVE THAT HIS CURRENT BRAND IS COVERED. [facility] IN [place].    PATIENT STATES THAT HE SAW EYE DOCTOR YESTERDAY AND WAS TOLD THAT HE HAS NO SIGN OF DIABETIC RETINOPATHY AT THIS TIME.

## 2023-03-29 ENCOUNTER — TELEPHONE (OUTPATIENT)
Dept: ENDOCRINOLOGY | Facility: CLINIC | Age: 63
End: 2023-03-29
Payer: MEDICARE

## 2023-03-29 NOTE — TELEPHONE ENCOUNTER
PT called to see if we have any Ozempic samples he could . Please call him and advise [phone]

## 2023-04-03 ENCOUNTER — LAB (OUTPATIENT)
Dept: LAB | Facility: HOSPITAL | Age: 63
End: 2023-04-03
Payer: MEDICARE

## 2023-04-03 ENCOUNTER — TRANSCRIBE ORDERS (OUTPATIENT)
Dept: ADMINISTRATIVE | Facility: HOSPITAL | Age: 63
End: 2023-04-03
Payer: MEDICARE

## 2023-04-03 DIAGNOSIS — E11.9 DIABETES MELLITUS WITHOUT COMPLICATION: ICD-10-CM

## 2023-04-03 DIAGNOSIS — M86.08 ACUTE HEMATOGENOUS OSTEOMYELITIS, OTHER SITES: ICD-10-CM

## 2023-04-03 DIAGNOSIS — N18.6 TYPE 2 DIABETES MELLITUS WITH ESRD (END-STAGE RENAL DISEASE): Primary | ICD-10-CM

## 2023-04-03 DIAGNOSIS — N18.9 CHRONIC KIDNEY DISEASE, UNSPECIFIED CKD STAGE: ICD-10-CM

## 2023-04-03 DIAGNOSIS — E11.22 TYPE 2 DIABETES MELLITUS WITH ESRD (END-STAGE RENAL DISEASE): ICD-10-CM

## 2023-04-03 DIAGNOSIS — N18.6 TYPE 2 DIABETES MELLITUS WITH ESRD (END-STAGE RENAL DISEASE): ICD-10-CM

## 2023-04-03 DIAGNOSIS — E11.22 TYPE 2 DIABETES MELLITUS WITH ESRD (END-STAGE RENAL DISEASE): Primary | ICD-10-CM

## 2023-04-03 LAB
ALBUMIN SERPL-MCNC: 2.7 G/DL (ref 3.5–5.2)
ALBUMIN/GLOB SERPL: 1.4 G/DL
ALP SERPL-CCNC: 73 U/L (ref 39–117)
ALT SERPL W P-5'-P-CCNC: 98 U/L (ref 1–41)
ANION GAP SERPL CALCULATED.3IONS-SCNC: 8 MMOL/L (ref 5–15)
AST SERPL-CCNC: 153 U/L (ref 1–40)
BASOPHILS # BLD AUTO: 0.03 10*3/MM3 (ref 0–0.2)
BASOPHILS NFR BLD AUTO: 0.7 % (ref 0–1.5)
BILIRUB SERPL-MCNC: <0.2 MG/DL (ref 0–1.2)
BUN SERPL-MCNC: 8 MG/DL (ref 8–23)
BUN/CREAT SERPL: 9.3 (ref 7–25)
CALCIUM SPEC-SCNC: 8.3 MG/DL (ref 8.6–10.5)
CHLORIDE SERPL-SCNC: 109 MMOL/L (ref 98–107)
CK SERPL-CCNC: 48 U/L (ref 20–200)
CO2 SERPL-SCNC: 24 MMOL/L (ref 22–29)
CREAT SERPL-MCNC: 0.86 MG/DL (ref 0.76–1.27)
CRP SERPL-MCNC: 1.5 MG/DL (ref 0–0.5)
DEPRECATED RDW RBC AUTO: 56.6 FL (ref 37–54)
EGFRCR SERPLBLD CKD-EPI 2021: 96.1 ML/MIN/1.73
EOSINOPHIL # BLD AUTO: 0.18 10*3/MM3 (ref 0–0.4)
EOSINOPHIL NFR BLD AUTO: 4 % (ref 0.3–6.2)
ERYTHROCYTE [DISTWIDTH] IN BLOOD BY AUTOMATED COUNT: 16.8 % (ref 12.3–15.4)
ERYTHROCYTE [SEDIMENTATION RATE] IN BLOOD: 13 MM/HR (ref 0–20)
GLOBULIN UR ELPH-MCNC: 1.9 GM/DL
GLUCOSE SERPL-MCNC: 66 MG/DL (ref 65–99)
HCT VFR BLD AUTO: 33.1 % (ref 37.5–51)
HGB BLD-MCNC: 10.2 G/DL (ref 13–17.7)
IMM GRANULOCYTES # BLD AUTO: 0.02 10*3/MM3 (ref 0–0.05)
IMM GRANULOCYTES NFR BLD AUTO: 0.4 % (ref 0–0.5)
LYMPHOCYTES # BLD AUTO: 0.8 10*3/MM3 (ref 0.7–3.1)
LYMPHOCYTES NFR BLD AUTO: 17.6 % (ref 19.6–45.3)
MCH RBC QN AUTO: 28.2 PG (ref 26.6–33)
MCHC RBC AUTO-ENTMCNC: 30.8 G/DL (ref 31.5–35.7)
MCV RBC AUTO: 91.4 FL (ref 79–97)
MONOCYTES # BLD AUTO: 0.33 10*3/MM3 (ref 0.1–0.9)
MONOCYTES NFR BLD AUTO: 7.3 % (ref 5–12)
NEUTROPHILS NFR BLD AUTO: 3.19 10*3/MM3 (ref 1.7–7)
NEUTROPHILS NFR BLD AUTO: 70 % (ref 42.7–76)
NRBC BLD AUTO-RTO: 0 /100 WBC (ref 0–0.2)
PLATELET # BLD AUTO: 262 10*3/MM3 (ref 140–450)
PMV BLD AUTO: 10.6 FL (ref 6–12)
POTASSIUM SERPL-SCNC: 4.7 MMOL/L (ref 3.5–5.2)
PROT SERPL-MCNC: 4.6 G/DL (ref 6–8.5)
RBC # BLD AUTO: 3.62 10*6/MM3 (ref 4.14–5.8)
SODIUM SERPL-SCNC: 141 MMOL/L (ref 136–145)
WBC NRBC COR # BLD: 4.55 10*3/MM3 (ref 3.4–10.8)

## 2023-04-03 PROCEDURE — 80053 COMPREHEN METABOLIC PANEL: CPT

## 2023-04-03 PROCEDURE — 86140 C-REACTIVE PROTEIN: CPT

## 2023-04-03 PROCEDURE — 36415 COLL VENOUS BLD VENIPUNCTURE: CPT

## 2023-04-03 PROCEDURE — 82550 ASSAY OF CK (CPK): CPT

## 2023-04-03 PROCEDURE — 85652 RBC SED RATE AUTOMATED: CPT

## 2023-04-03 PROCEDURE — 85025 COMPLETE CBC W/AUTO DIFF WBC: CPT

## 2023-04-10 ENCOUNTER — HOSPITAL ENCOUNTER (OUTPATIENT)
Facility: HOSPITAL | Age: 63
Setting detail: OBSERVATION
Discharge: HOME | End: 2023-04-13
Attending: EMERGENCY MEDICINE | Admitting: INTERNAL MEDICINE
Payer: MEDICARE

## 2023-04-10 ENCOUNTER — TRANSCRIBE ORDERS (OUTPATIENT)
Dept: LAB | Facility: HOSPITAL | Age: 63
End: 2023-04-10
Payer: MEDICARE

## 2023-04-10 ENCOUNTER — HOSPITAL ENCOUNTER (OUTPATIENT)
Dept: CARDIOLOGY | Facility: HOSPITAL | Age: 63
Discharge: HOME | End: 2023-04-10
Payer: MEDICARE

## 2023-04-10 ENCOUNTER — LAB (OUTPATIENT)
Dept: LAB | Facility: HOSPITAL | Age: 63
End: 2023-04-10
Payer: MEDICARE

## 2023-04-10 ENCOUNTER — TELEPHONE (OUTPATIENT)
Dept: ENDOCRINOLOGY | Facility: CLINIC | Age: 63
End: 2023-04-10
Payer: MEDICARE

## 2023-04-10 ENCOUNTER — APPOINTMENT (OUTPATIENT)
Dept: CT IMAGING | Facility: HOSPITAL | Age: 63
End: 2023-04-10
Payer: MEDICARE

## 2023-04-10 ENCOUNTER — TRANSCRIBE ORDERS (OUTPATIENT)
Dept: ADMINISTRATIVE | Facility: HOSPITAL | Age: 63
End: 2023-04-10
Payer: MEDICARE

## 2023-04-10 VITALS — HEIGHT: 72 IN | WEIGHT: 249.12 LBS | BODY MASS INDEX: 33.74 KG/M2

## 2023-04-10 DIAGNOSIS — I82.A11 ACUTE DEEP VEIN THROMBOSIS (DVT) OF AXILLARY VEIN OF RIGHT UPPER EXTREMITY: Primary | ICD-10-CM

## 2023-04-10 DIAGNOSIS — K52.1 TOXIC GASTROENTERITIS AND COLITIS: Primary | ICD-10-CM

## 2023-04-10 DIAGNOSIS — Z87.39 HISTORY OF OSTEOMYELITIS: ICD-10-CM

## 2023-04-10 DIAGNOSIS — F51.01 PRIMARY INSOMNIA: ICD-10-CM

## 2023-04-10 DIAGNOSIS — G89.4 CHRONIC PAIN DISORDER: ICD-10-CM

## 2023-04-10 DIAGNOSIS — L03.032 ABSCESS OF FIFTH TOENAIL OF LEFT FOOT: ICD-10-CM

## 2023-04-10 DIAGNOSIS — I82.621 ACUTE DEEP VEIN THROMBOSIS (DVT) OF RIGHT UPPER EXTREMITY, UNSPECIFIED VEIN: ICD-10-CM

## 2023-04-10 DIAGNOSIS — M86.172 ACUTE OSTEOMYELITIS OF LEFT ANKLE OR FOOT: ICD-10-CM

## 2023-04-10 DIAGNOSIS — I82.621 ACUTE DEEP VEIN THROMBOSIS (DVT) OF RIGHT UPPER EXTREMITY, UNSPECIFIED VEIN: Primary | ICD-10-CM

## 2023-04-10 DIAGNOSIS — I82.621 ACUTE DEEP VEIN THROMBOSIS (DVT) OF BRACHIAL VEIN OF RIGHT UPPER EXTREMITY: ICD-10-CM

## 2023-04-10 DIAGNOSIS — K52.1 TOXIC GASTROENTERITIS AND COLITIS: ICD-10-CM

## 2023-04-10 DIAGNOSIS — E11.40 TYPE 2 DIABETES MELLITUS WITH DIABETIC NEUROPATHY, WITHOUT LONG-TERM CURRENT USE OF INSULIN: ICD-10-CM

## 2023-04-10 PROBLEM — I82.409 DVT (DEEP VENOUS THROMBOSIS): Status: ACTIVE | Noted: 2023-04-10

## 2023-04-10 PROBLEM — R06.00 DYSPNEA: Status: ACTIVE | Noted: 2023-04-10

## 2023-04-10 PROBLEM — R79.89 ELEVATED LFTS: Status: ACTIVE | Noted: 2023-04-10

## 2023-04-10 PROBLEM — R19.7 DIARRHEA: Status: ACTIVE | Noted: 2023-04-10

## 2023-04-10 LAB
ALBUMIN SERPL-MCNC: 3.1 G/DL (ref 3.5–5.2)
ALBUMIN/GLOB SERPL: 1.2 G/DL
ALP SERPL-CCNC: 96 U/L (ref 39–117)
ALT SERPL W P-5'-P-CCNC: 199 U/L (ref 1–41)
ANION GAP SERPL CALCULATED.3IONS-SCNC: 10 MMOL/L (ref 5–15)
APTT PPP: 34.9 SECONDS (ref 60–90)
AST SERPL-CCNC: 220 U/L (ref 1–40)
BASOPHILS # BLD AUTO: 0.05 10*3/MM3 (ref 0–0.2)
BASOPHILS NFR BLD AUTO: 0.8 % (ref 0–1.5)
BH CV UPPER VENOUS RIGHT AXILLARY COLOR: 1
BH CV UPPER VENOUS RIGHT AXILLARY COMPRESS: NORMAL
BH CV UPPER VENOUS RIGHT AXILLARY PHASIC: NORMAL
BH CV UPPER VENOUS RIGHT AXILLARY SPONT: NORMAL
BH CV UPPER VENOUS RIGHT BASILIC FOREARM COMPRESS: NORMAL
BH CV UPPER VENOUS RIGHT BASILIC UPPER COLOR: 1
BH CV UPPER VENOUS RIGHT BASILIC UPPER COMPRESS: NORMAL
BH CV UPPER VENOUS RIGHT BASILIC UPPER PHASIC: NORMAL
BH CV UPPER VENOUS RIGHT BASILIC UPPER SPONT: NORMAL
BH CV UPPER VENOUS RIGHT BRACHIAL COMPRESS: NORMAL
BH CV UPPER VENOUS RIGHT CEPHALIC FOREARM COMPRESS: NORMAL
BH CV UPPER VENOUS RIGHT CEPHALIC UPPER COMPRESS: NORMAL
BH CV UPPER VENOUS RIGHT INTERNAL JUGULAR PHASIC: NORMAL
BH CV UPPER VENOUS RIGHT INTERNAL JUGULAR SPONT: NORMAL
BH CV UPPER VENOUS RIGHT RADIAL COMPRESS: NORMAL
BH CV UPPER VENOUS RIGHT SUBCLAVIAN COLOR: 1
BH CV UPPER VENOUS RIGHT SUBCLAVIAN COMPRESS: NORMAL
BH CV UPPER VENOUS RIGHT SUBCLAVIAN PHASIC: NORMAL
BH CV UPPER VENOUS RIGHT SUBCLAVIAN SPONT: NORMAL
BH CV UPPER VENOUS RIGHT ULNAR COMPRESS: NORMAL
BH CV VAS PRELIMINARY FINDINGS SCRIPTING: 1
BILIRUB SERPL-MCNC: 0.2 MG/DL (ref 0–1.2)
BUN SERPL-MCNC: 8 MG/DL (ref 8–23)
BUN/CREAT SERPL: 10.3 (ref 7–25)
C DIFF TOX GENS STL QL NAA+PROBE: NOT DETECTED
CALCIUM SPEC-SCNC: 8.6 MG/DL (ref 8.6–10.5)
CHLORIDE SERPL-SCNC: 109 MMOL/L (ref 98–107)
CO2 SERPL-SCNC: 24 MMOL/L (ref 22–29)
CREAT SERPL-MCNC: 0.78 MG/DL (ref 0.76–1.27)
DEPRECATED RDW RBC AUTO: 51.3 FL (ref 37–54)
EGFRCR SERPLBLD CKD-EPI 2021: 99 ML/MIN/1.73
EOSINOPHIL # BLD AUTO: 0.16 10*3/MM3 (ref 0–0.4)
EOSINOPHIL NFR BLD AUTO: 2.5 % (ref 0.3–6.2)
ERYTHROCYTE [DISTWIDTH] IN BLOOD BY AUTOMATED COUNT: 15.6 % (ref 12.3–15.4)
GLOBULIN UR ELPH-MCNC: 2.5 GM/DL
GLUCOSE SERPL-MCNC: 98 MG/DL (ref 65–99)
HCT VFR BLD AUTO: 37.7 % (ref 37.5–51)
HGB BLD-MCNC: 11.8 G/DL (ref 13–17.7)
IMM GRANULOCYTES # BLD AUTO: 0.03 10*3/MM3 (ref 0–0.05)
IMM GRANULOCYTES NFR BLD AUTO: 0.5 % (ref 0–0.5)
INR PPP: 1.1 (ref 0.84–1.13)
LYMPHOCYTES # BLD AUTO: 1.62 10*3/MM3 (ref 0.7–3.1)
LYMPHOCYTES NFR BLD AUTO: 25.8 % (ref 19.6–45.3)
MAXIMAL PREDICTED HEART RATE: 155 BPM
MCH RBC QN AUTO: 28 PG (ref 26.6–33)
MCHC RBC AUTO-ENTMCNC: 31.3 G/DL (ref 31.5–35.7)
MCV RBC AUTO: 89.3 FL (ref 79–97)
MONOCYTES # BLD AUTO: 0.61 10*3/MM3 (ref 0.1–0.9)
MONOCYTES NFR BLD AUTO: 9.7 % (ref 5–12)
NEUTROPHILS NFR BLD AUTO: 3.82 10*3/MM3 (ref 1.7–7)
NEUTROPHILS NFR BLD AUTO: 60.7 % (ref 42.7–76)
NRBC BLD AUTO-RTO: 0 /100 WBC (ref 0–0.2)
PLATELET # BLD AUTO: 303 10*3/MM3 (ref 140–450)
PMV BLD AUTO: 9.7 FL (ref 6–12)
POTASSIUM SERPL-SCNC: 4.1 MMOL/L (ref 3.5–5.2)
PROT SERPL-MCNC: 5.6 G/DL (ref 6–8.5)
PROTHROMBIN TIME: 14.1 SECONDS (ref 11.4–14.4)
RBC # BLD AUTO: 4.22 10*6/MM3 (ref 4.14–5.8)
SODIUM SERPL-SCNC: 143 MMOL/L (ref 136–145)
STRESS TARGET HR: 132 BPM
UFH PPP CHRO-ACNC: 0.39 IU/ML (ref 0.3–0.7)
WBC NRBC COR # BLD: 6.29 10*3/MM3 (ref 3.4–10.8)

## 2023-04-10 PROCEDURE — 85520 HEPARIN ASSAY: CPT | Performed by: EMERGENCY MEDICINE

## 2023-04-10 PROCEDURE — G0378 HOSPITAL OBSERVATION PER HR: HCPCS

## 2023-04-10 PROCEDURE — 25010000002 HEPARIN (PORCINE) 25000-0.45 UT/250ML-% SOLUTION: Performed by: EMERGENCY MEDICINE

## 2023-04-10 PROCEDURE — 96366 THER/PROPH/DIAG IV INF ADDON: CPT

## 2023-04-10 PROCEDURE — 25010000002 DAPTOMYCIN PER 1 MG: Performed by: NURSE PRACTITIONER

## 2023-04-10 PROCEDURE — 71275 CT ANGIOGRAPHY CHEST: CPT

## 2023-04-10 PROCEDURE — 99284 EMERGENCY DEPT VISIT MOD MDM: CPT

## 2023-04-10 PROCEDURE — 85025 COMPLETE CBC W/AUTO DIFF WBC: CPT | Performed by: EMERGENCY MEDICINE

## 2023-04-10 PROCEDURE — 93971 EXTREMITY STUDY: CPT | Performed by: INTERNAL MEDICINE

## 2023-04-10 PROCEDURE — 85730 THROMBOPLASTIN TIME PARTIAL: CPT | Performed by: EMERGENCY MEDICINE

## 2023-04-10 PROCEDURE — 25510000001 IOPAMIDOL PER 1 ML: Performed by: FAMILY MEDICINE

## 2023-04-10 PROCEDURE — 87507 IADNA-DNA/RNA PROBE TQ 12-25: CPT | Performed by: NURSE PRACTITIONER

## 2023-04-10 PROCEDURE — 87493 C DIFF AMPLIFIED PROBE: CPT

## 2023-04-10 PROCEDURE — 93971 EXTREMITY STUDY: CPT

## 2023-04-10 PROCEDURE — 85610 PROTHROMBIN TIME: CPT | Performed by: EMERGENCY MEDICINE

## 2023-04-10 PROCEDURE — 96365 THER/PROPH/DIAG IV INF INIT: CPT

## 2023-04-10 PROCEDURE — 80053 COMPREHEN METABOLIC PANEL: CPT | Performed by: EMERGENCY MEDICINE

## 2023-04-10 PROCEDURE — 74176 CT ABD & PELVIS W/O CONTRAST: CPT

## 2023-04-10 RX ORDER — SODIUM CHLORIDE 0.9 % (FLUSH) 0.9 %
10 SYRINGE (ML) INJECTION EVERY 12 HOURS SCHEDULED
Status: DISCONTINUED | OUTPATIENT
Start: 2023-04-10 | End: 2023-04-13 | Stop reason: HOSPADM

## 2023-04-10 RX ORDER — SODIUM CHLORIDE 9 MG/ML
40 INJECTION, SOLUTION INTRAVENOUS AS NEEDED
Status: DISCONTINUED | OUTPATIENT
Start: 2023-04-10 | End: 2023-04-13 | Stop reason: HOSPADM

## 2023-04-10 RX ORDER — HEPARIN SODIUM 10000 [USP'U]/100ML
13 INJECTION, SOLUTION INTRAVENOUS
Status: DISPENSED | OUTPATIENT
Start: 2023-04-10 | End: 2023-04-11

## 2023-04-10 RX ORDER — CITALOPRAM 20 MG/1
20 TABLET ORAL DAILY
Status: DISCONTINUED | OUTPATIENT
Start: 2023-04-11 | End: 2023-04-13 | Stop reason: HOSPADM

## 2023-04-10 RX ORDER — PANTOPRAZOLE SODIUM 40 MG/1
40 TABLET, DELAYED RELEASE ORAL
Status: DISCONTINUED | OUTPATIENT
Start: 2023-04-11 | End: 2023-04-13 | Stop reason: HOSPADM

## 2023-04-10 RX ORDER — FLUTICASONE PROPIONATE 50 MCG
2 SPRAY, SUSPENSION (ML) NASAL DAILY
Status: DISCONTINUED | OUTPATIENT
Start: 2023-04-11 | End: 2023-04-13 | Stop reason: HOSPADM

## 2023-04-10 RX ORDER — HEPARIN SODIUM 1000 [USP'U]/ML
4000 INJECTION, SOLUTION INTRAVENOUS; SUBCUTANEOUS AS NEEDED
Status: DISCONTINUED | OUTPATIENT
Start: 2023-04-10 | End: 2023-04-10

## 2023-04-10 RX ORDER — GABAPENTIN 400 MG/1
400 CAPSULE ORAL EVERY 12 HOURS SCHEDULED
Status: DISCONTINUED | OUTPATIENT
Start: 2023-04-10 | End: 2023-04-13 | Stop reason: HOSPADM

## 2023-04-10 RX ORDER — AMITRIPTYLINE HYDROCHLORIDE 25 MG/1
25 TABLET, FILM COATED ORAL NIGHTLY
Status: DISCONTINUED | OUTPATIENT
Start: 2023-04-10 | End: 2023-04-13 | Stop reason: HOSPADM

## 2023-04-10 RX ORDER — INSULIN LISPRO 100 [IU]/ML
0-7 INJECTION, SOLUTION INTRAVENOUS; SUBCUTANEOUS
Status: DISCONTINUED | OUTPATIENT
Start: 2023-04-11 | End: 2023-04-13 | Stop reason: HOSPADM

## 2023-04-10 RX ORDER — IBUPROFEN 600 MG/1
1 TABLET ORAL
Status: DISCONTINUED | OUTPATIENT
Start: 2023-04-10 | End: 2023-04-13 | Stop reason: HOSPADM

## 2023-04-10 RX ORDER — HYDRALAZINE HYDROCHLORIDE 25 MG/1
25 TABLET, FILM COATED ORAL EVERY 12 HOURS SCHEDULED
Status: DISCONTINUED | OUTPATIENT
Start: 2023-04-10 | End: 2023-04-13 | Stop reason: HOSPADM

## 2023-04-10 RX ORDER — HEPARIN SODIUM 1000 [USP'U]/ML
2000 INJECTION, SOLUTION INTRAVENOUS; SUBCUTANEOUS AS NEEDED
Status: DISCONTINUED | OUTPATIENT
Start: 2023-04-10 | End: 2023-04-10

## 2023-04-10 RX ORDER — METOPROLOL SUCCINATE 100 MG/1
100 TABLET, EXTENDED RELEASE ORAL
Status: DISCONTINUED | OUTPATIENT
Start: 2023-04-11 | End: 2023-04-13 | Stop reason: HOSPADM

## 2023-04-10 RX ORDER — DEXTROSE MONOHYDRATE 25 G/50ML
25 INJECTION, SOLUTION INTRAVENOUS
Status: DISCONTINUED | OUTPATIENT
Start: 2023-04-10 | End: 2023-04-13 | Stop reason: HOSPADM

## 2023-04-10 RX ORDER — HEPARIN SODIUM 1000 [USP'U]/ML
80 INJECTION, SOLUTION INTRAVENOUS; SUBCUTANEOUS ONCE
Status: DISCONTINUED | OUTPATIENT
Start: 2023-04-10 | End: 2023-04-10

## 2023-04-10 RX ORDER — CLONIDINE HYDROCHLORIDE 0.1 MG/1
0.1 TABLET ORAL EVERY 12 HOURS SCHEDULED
Status: DISCONTINUED | OUTPATIENT
Start: 2023-04-10 | End: 2023-04-13 | Stop reason: HOSPADM

## 2023-04-10 RX ORDER — ATORVASTATIN CALCIUM 20 MG/1
20 TABLET, FILM COATED ORAL DAILY
Status: DISCONTINUED | OUTPATIENT
Start: 2023-04-11 | End: 2023-04-13 | Stop reason: HOSPADM

## 2023-04-10 RX ORDER — SODIUM CHLORIDE 0.9 % (FLUSH) 0.9 %
10 SYRINGE (ML) INJECTION AS NEEDED
Status: DISCONTINUED | OUTPATIENT
Start: 2023-04-10 | End: 2023-04-13 | Stop reason: HOSPADM

## 2023-04-10 RX ORDER — METHOCARBAMOL 750 MG/1
750 TABLET, FILM COATED ORAL EVERY 12 HOURS SCHEDULED
Status: DISCONTINUED | OUTPATIENT
Start: 2023-04-10 | End: 2023-04-13 | Stop reason: HOSPADM

## 2023-04-10 RX ORDER — TEMAZEPAM 15 MG/1
30 CAPSULE ORAL NIGHTLY
Status: DISCONTINUED | OUTPATIENT
Start: 2023-04-10 | End: 2023-04-13 | Stop reason: HOSPADM

## 2023-04-10 RX ORDER — NICOTINE POLACRILEX 4 MG
15 LOZENGE BUCCAL
Status: DISCONTINUED | OUTPATIENT
Start: 2023-04-10 | End: 2023-04-13 | Stop reason: HOSPADM

## 2023-04-10 RX ORDER — SODIUM CHLORIDE 0.9 % (FLUSH) 0.9 %
10 SYRINGE (ML) INJECTION AS NEEDED
Status: DISCONTINUED | OUTPATIENT
Start: 2023-04-10 | End: 2023-04-12

## 2023-04-10 RX ADMIN — CLONIDINE HYDROCHLORIDE 0.1 MG: 0.1 TABLET ORAL at 21:36

## 2023-04-10 RX ADMIN — TEMAZEPAM 30 MG: 15 CAPSULE ORAL at 21:36

## 2023-04-10 RX ADMIN — GABAPENTIN 400 MG: 400 CAPSULE ORAL at 21:36

## 2023-04-10 RX ADMIN — HEPARIN SODIUM 13.5 UNITS/KG/HR: 10000 INJECTION, SOLUTION INTRAVENOUS at 20:21

## 2023-04-10 RX ADMIN — DAPTOMYCIN 400 MG: 500 INJECTION, POWDER, LYOPHILIZED, FOR SOLUTION INTRAVENOUS at 22:10

## 2023-04-10 RX ADMIN — IOPAMIDOL 85 ML: 755 INJECTION, SOLUTION INTRAVENOUS at 21:20

## 2023-04-10 RX ADMIN — Medication 10 ML: at 21:36

## 2023-04-10 RX ADMIN — AMITRIPTYLINE HYDROCHLORIDE 25 MG: 25 TABLET, FILM COATED ORAL at 21:36

## 2023-04-10 RX ADMIN — METHOCARBAMOL 750 MG: 750 TABLET ORAL at 21:36

## 2023-04-10 RX ADMIN — HYDRALAZINE HYDROCHLORIDE 25 MG: 25 TABLET, FILM COATED ORAL at 21:36

## 2023-04-10 NOTE — H&P
[facility] Medicine Services  HISTORY AND PHYSICAL    Patient Name: Gaetano Marcelo  : 1960  MRN: [id number]  Primary Care Physician: Juan Mcclain MD  Date of admission: 4/10/2023    Subjective   Subjective     Chief Complaint:  Right arm swelling    HPI:  Gaetano Marcelo is a 63 y.o. male with history of hypertension, hyperlipidemia, GERD, T2DM, osteoarthritis, was discharged from [facility] on 3/30/2023 with osteomyelitis s/p amputation left third.  Patient was followed by Dr. Donohue with ID and treated with daptomycin and Invanz.  Patient presents to the ED today with complaints of right hand and arm swelling.  Patient reports that he followed up today with his surgeon Dr. Urbina.  After leaving there he went home to take his antibiotic and his wife noticed that his right hand and arm were swollen.  They had another follow-up appointment this afternoon with Dr. Donohue who ordered an ultrasound of his right upper extremity.  Patient was told that he has a DVT in his right arm and was referred to the ED.  Patient states that he has had shortness of breath, coughing, and wheezing for the past 2 weeks.  He has had diarrhea at least twice daily since starting antibiotics 2 weeks ago.  He states that sometimes he is incontinent of stool and has to wear an adult brief.  He has swelling in his lower extremities.  Today he had significant weakness and was unable to stand up this evening.  His wife does his dressing changes daily and he has home health every week.  No fevers, chills, chest pain, nausea, vomiting, abdominal pain, headaches, dizziness, focal weakness, or any other complaints at this time.  Patient is being admitted to the hospital medicine service for further evaluation and management.        Review of Systems   HENT: Negative.    Eyes: Negative.    Respiratory: Positive for cough, shortness of breath and wheezing.    Cardiovascular: Positive for leg swelling (RLE). Negative  for chest pain and palpitations.   Gastrointestinal: Positive for diarrhea. Negative for abdominal distention, abdominal pain, constipation, nausea and vomiting.   Endocrine: Negative.    Genitourinary: Negative.    Musculoskeletal: Negative.    Skin: Positive for wound. Negative for color change and rash.   Allergic/Immunologic: Negative.    Neurological: Positive for weakness. Negative for dizziness, syncope and light-headedness.   Hematological: Negative.    Psychiatric/Behavioral: Negative.             Personal History     Past Medical History:   Diagnosis Date   • GERD (gastroesophageal reflux disease)    • Hyperlipidemia    • Hypertension    • Insomnia    • Low back pain    • Osteoarthritis    • Pain, phantom limb    • Peripheral edema    • Type 2 diabetes mellitus              Past Surgical History:   Procedure Laterality Date   • APPENDECTOMY     • CARPAL TUNNEL RELEASE Left 08/18/2020    Dr. Yosef Diaz   • CHOLECYSTECTOMY     • HERNIA REPAIR     • REPLACEMENT TOTAL KNEE Right    • KANWAL-EN-Y PROCEDURE  2004   • TOE AMPUTATION Right 2013    1st, 2nd Rt toes   • ULNAR NERVE DECOMPRESSION Left 08/18/2020    Dr. Yosef Diaz       Family History:  family history includes Coronary artery disease in his father; Diabetes in his brother, father, mother, and paternal grandmother.     Social History:  reports that he has never smoked. He has never used smokeless tobacco. He reports that he does not currently use alcohol. He reports that he does not use drugs.  Social History     Social History Narrative   • Not on file       Medications:  5-Hydroxytryptophan, Aspirin Buf(CaCarb-MgCarb-MgO), Bismuth Subgallate, Blood Glucose Monitoring Suppl, Calcium Carbonate-Vit D-Min, Foltanx RF, Ginkgo Biloba, L-Methylfolate-Algae-B12-B6, Lancets 33G, OneTouch Delica Plus [id number], Semaglutide (1 MG/DOSE), Vitamin D3, amitriptyline, atorvastatin, citalopram, cloNIDine, cyanocobalamin, esomeprazole, fish oil, fluticasone,  gabapentin, glucose blood, hydrALAZINE, loperamide, metFORMIN ER, methocarbamol, metoprolol succinate XL, multivitamin, oxybutynin XL, temazepam, traMADol-acetaminophen, and trospium    Allergies   Allergen Reactions   • Hydrochlorothiazide Rash   • Penicillins Rash       Objective   Objective     Vital Signs:   Temp:  [97.8 °F (36.6 °C)-98.2 °F (36.8 °C)] 97.8 °F (36.6 °C)  Heart Rate:  [78-87] 78  Resp:  [18-20] 18  BP: (129-182)/() 182/115    Physical Exam   Constitutional: Awake, alert, resting in bed, wife at bedside  Eyes: PERRLA, sclerae anicteric, no conjunctival injection  HENT: NCAT, mucous membranes moist  Neck: Supple, no thyromegaly, no lymphadenopathy, trachea midline  Respiratory: Clear to auscultation bilaterally, nonlabored respirations   Cardiovascular: RRR, no murmurs, rubs, or gallops, palpable pedal pulses bilaterally  Gastrointestinal: Positive bowel sounds, soft, nontender, nondistended  Musculoskeletal: 1+ RLE edema, 2+ LLE edema, RUE edema, no clubbing or cyanosis to extremities  Psychiatric: Appropriate affect, cooperative  Neurologic: Oriented x 3, strength symmetric in all extremities, Cranial Nerves grossly intact to confrontation, speech clear  Skin: No rashes, left foot dressing clean dry intact       Result Review:  I have personally reviewed the results from the time of this admission to 4/10/2023 21:00 EDT and agree with these findings:  [x]  Laboratory list / accordion  []  Microbiology  []  Radiology   []  EKG/Telemetry   []  Cardiology/Vascular   []  Pathology  [x]  Old records  []  Other:  Most notable findings include:     LAB RESULTS:      Lab 04/10/23  2009   WBC 6.29   HEMOGLOBIN 11.8*   HEMATOCRIT 37.7   PLATELETS 303   NEUTROS ABS 3.82   IMMATURE GRANS (ABS) 0.03   LYMPHS ABS 1.62   MONOS ABS 0.61   EOS ABS 0.16   MCV 89.3   PROTIME 14.1   APTT 34.9*   HEPARIN ANTI-XA 0.39         Lab 04/10/23  2009   SODIUM 143   POTASSIUM 4.1   CHLORIDE 109*   CO2 24.0   ANION GAP  10.0   BUN 8   CREATININE 0.78   EGFR 99.0   GLUCOSE 98   CALCIUM 8.6         Lab 04/10/23  2009   TOTAL PROTEIN 5.6*   ALBUMIN 3.1*   GLOBULIN 2.5   ALT (SGPT) 199*   AST (SGOT) 220*   BILIRUBIN 0.2   ALK PHOS 96         Lab 04/10/23  2009   PROTIME 14.1   INR 1.10                 Brief Urine Lab Results     None        Microbiology Results (last 10 days)     Procedure Component Value - Date/Time    Clostridioides difficile Toxin, PCR - Stool, Per Rectum [[id number]]  (Normal) Collected: 04/10/23 1534    Lab Status: Final result Specimen: Stool from Per Rectum Updated: 04/10/23 1650     C. Difficile Toxins by PCR Not Detected    Narrative:      The result indicates the absence of toxigenic C. difficile from stool specimen.           Duplex venous upper extremity right CAR    Result Date: 4/10/2023  •  Sub-acute right upper extremity deep vein thrombosis noted in the subclavian and axillary. •  Sub-acute right upper extremity superficial thrombophlebitis noted in the basilic (upper arm).           Assessment & Plan   Assessment & Plan       DVT (deep venous thrombosis)    Type 2 diabetes mellitus with complication, without long-term current use of insulin    Hyperlipidemia    Essential hypertension    GERD (gastroesophageal reflux disease)    Elevated LFTs    Diarrhea    Dyspnea      Assessment and Plan:    DVT  --Ultrasound shows subacute right upper extremity deep vein thrombosis in the subclavian and axillary.  Subacute right upper extremity superficial thrombophlebitis in the basilic.  -- Heparin drip  -- Courtesy consult to Dr. Donohue  -- Consult PICC nurse in a.m.  -- labs    Osteomyelitis s/p left third toe partial amputation 3/28 with Dr[facility] Urbina at [facility]  Generalized weakness  -- Surgical shoe with PWB LLE  -- Consult OT/PT  -- Continue daptomycin and Invanz  -- Consult Dr. Donohue with ID  -- Consult WOC in the am  -- consult case management    Dyspnea and cough  -- CTA chest    Elevated LFT's  -- ALT  199,   -- acute hepatitis panel  -- CT abdomen pelvis    Diarrhea  -- C. difficile negative  -- CT abdomen pelvis  -- stool cultures    T2DM  -- A1c 5.4% on 3/14/23   -- FSBG with SSI    Hypertension   Hyperlipidemia  -- Continue clonidine, hydralazine, metoprolol  -- statin    GERD  -- PPI      DVT prophylaxis:  On heparin drip    CODE STATUS:    Level Of Support Discussed With: Patient  Code Status (Patient has no pulse and is not breathing): CPR (Attempt to Resuscitate)  Medical Interventions (Patient has pulse or is breathing): Full Support      Expected Discharge  TBD    This note has been completed as part of a split-shared workflow.     Signature: Electronically signed by EMILIA Pena, 04/10/23, 9:01 PM EDT.  Total time spent: 60 minutes  Time spent includes time reviewing chart, face-to-face time, counseling patient/family/caregiver, ordering medications/tests/procedures, communicating with other health care professionals, documenting clinical information in the electronic health record, and coordination of care.

## 2023-04-10 NOTE — Clinical Note
Level of Care: Telemetry [5]   Diagnosis: DVT (deep venous thrombosis) [448675]   Admitting Physician: ALICE COBIAN [[id number]]

## 2023-04-11 LAB
ADV 40+41 DNA STL QL NAA+NON-PROBE: NOT DETECTED
ALBUMIN SERPL-MCNC: 2.6 G/DL (ref 3.5–5.2)
ALBUMIN/GLOB SERPL: 1.1 G/DL
ALP SERPL-CCNC: 86 U/L (ref 39–117)
ALT SERPL W P-5'-P-CCNC: 180 U/L (ref 1–41)
ANION GAP SERPL CALCULATED.3IONS-SCNC: 7 MMOL/L (ref 5–15)
APTT PPP: 106.6 SECONDS (ref 60–90)
APTT PPP: 50.5 SECONDS (ref 60–90)
AST SERPL-CCNC: 222 U/L (ref 1–40)
ASTRO TYP 1-8 RNA STL QL NAA+NON-PROBE: NOT DETECTED
BASOPHILS # BLD AUTO: 0.03 10*3/MM3 (ref 0–0.2)
BASOPHILS NFR BLD AUTO: 0.5 % (ref 0–1.5)
BILIRUB SERPL-MCNC: 0.2 MG/DL (ref 0–1.2)
BUN SERPL-MCNC: 8 MG/DL (ref 8–23)
BUN/CREAT SERPL: 8.5 (ref 7–25)
C CAYETANENSIS DNA STL QL NAA+NON-PROBE: NOT DETECTED
C COLI+JEJ+UPSA DNA STL QL NAA+NON-PROBE: NOT DETECTED
CALCIUM SPEC-SCNC: 8 MG/DL (ref 8.6–10.5)
CHLORIDE SERPL-SCNC: 110 MMOL/L (ref 98–107)
CK SERPL-CCNC: 58 U/L (ref 20–200)
CO2 SERPL-SCNC: 25 MMOL/L (ref 22–29)
CREAT SERPL-MCNC: 0.94 MG/DL (ref 0.76–1.27)
CRYPTOSP DNA STL QL NAA+NON-PROBE: NOT DETECTED
DEPRECATED RDW RBC AUTO: 51.3 FL (ref 37–54)
E HISTOLYT DNA STL QL NAA+NON-PROBE: NOT DETECTED
EAEC PAA PLAS AGGR+AATA ST NAA+NON-PRB: NOT DETECTED
EC STX1+STX2 GENES STL QL NAA+NON-PROBE: NOT DETECTED
EGFRCR SERPLBLD CKD-EPI 2021: 90 ML/MIN/1.73
EOSINOPHIL # BLD AUTO: 0.2 10*3/MM3 (ref 0–0.4)
EOSINOPHIL NFR BLD AUTO: 3.6 % (ref 0.3–6.2)
EPEC EAE GENE STL QL NAA+NON-PROBE: NOT DETECTED
ERYTHROCYTE [DISTWIDTH] IN BLOOD BY AUTOMATED COUNT: 15.6 % (ref 12.3–15.4)
ETEC LTA+ST1A+ST1B TOX ST NAA+NON-PROBE: NOT DETECTED
G LAMBLIA DNA STL QL NAA+NON-PROBE: NOT DETECTED
GLOBULIN UR ELPH-MCNC: 2.4 GM/DL
GLUCOSE BLDC GLUCOMTR-MCNC: 87 MG/DL (ref 70–130)
GLUCOSE BLDC GLUCOMTR-MCNC: 91 MG/DL (ref 70–130)
GLUCOSE BLDC GLUCOMTR-MCNC: 98 MG/DL (ref 70–130)
GLUCOSE SERPL-MCNC: 98 MG/DL (ref 65–99)
HAV IGM SERPL QL IA: NORMAL
HBV CORE IGM SERPL QL IA: NORMAL
HBV SURFACE AG SERPL QL IA: NORMAL
HCT VFR BLD AUTO: 33.8 % (ref 37.5–51)
HCV AB SER DONR QL: NORMAL
HGB BLD-MCNC: 10.6 G/DL (ref 13–17.7)
IMM GRANULOCYTES # BLD AUTO: 0.02 10*3/MM3 (ref 0–0.05)
IMM GRANULOCYTES NFR BLD AUTO: 0.4 % (ref 0–0.5)
LYMPHOCYTES # BLD AUTO: 1.52 10*3/MM3 (ref 0.7–3.1)
LYMPHOCYTES NFR BLD AUTO: 27.7 % (ref 19.6–45.3)
MCH RBC QN AUTO: 28 PG (ref 26.6–33)
MCHC RBC AUTO-ENTMCNC: 31.4 G/DL (ref 31.5–35.7)
MCV RBC AUTO: 89.4 FL (ref 79–97)
MONOCYTES # BLD AUTO: 0.56 10*3/MM3 (ref 0.1–0.9)
MONOCYTES NFR BLD AUTO: 10.2 % (ref 5–12)
NEUTROPHILS NFR BLD AUTO: 3.15 10*3/MM3 (ref 1.7–7)
NEUTROPHILS NFR BLD AUTO: 57.6 % (ref 42.7–76)
NOROVIRUS GI+II RNA STL QL NAA+NON-PROBE: NOT DETECTED
NRBC BLD AUTO-RTO: 0 /100 WBC (ref 0–0.2)
P SHIGELLOIDES DNA STL QL NAA+NON-PROBE: NOT DETECTED
PLATELET # BLD AUTO: 277 10*3/MM3 (ref 140–450)
PMV BLD AUTO: 9.3 FL (ref 6–12)
POTASSIUM SERPL-SCNC: 3.8 MMOL/L (ref 3.5–5.2)
PROT SERPL-MCNC: 5 G/DL (ref 6–8.5)
RBC # BLD AUTO: 3.78 10*6/MM3 (ref 4.14–5.8)
RVA RNA STL QL NAA+NON-PROBE: NOT DETECTED
S ENT+BONG DNA STL QL NAA+NON-PROBE: NOT DETECTED
SAPO I+II+IV+V RNA STL QL NAA+NON-PROBE: NOT DETECTED
SHIGELLA SP+EIEC IPAH ST NAA+NON-PROBE: NOT DETECTED
SODIUM SERPL-SCNC: 142 MMOL/L (ref 136–145)
V CHOL+PARA+VUL DNA STL QL NAA+NON-PROBE: NOT DETECTED
V CHOLERAE DNA STL QL NAA+NON-PROBE: NOT DETECTED
WBC NRBC COR # BLD: 5.48 10*3/MM3 (ref 3.4–10.8)
Y ENTEROCOL DNA STL QL NAA+NON-PROBE: NOT DETECTED

## 2023-04-11 PROCEDURE — 97162 PT EVAL MOD COMPLEX 30 MIN: CPT

## 2023-04-11 PROCEDURE — 25010000002 HEPARIN (PORCINE) PER 1000 UNITS

## 2023-04-11 PROCEDURE — 25010000002 DAPTOMYCIN PER 1 MG: Performed by: NURSE PRACTITIONER

## 2023-04-11 PROCEDURE — 82550 ASSAY OF CK (CPK): CPT | Performed by: INTERNAL MEDICINE

## 2023-04-11 PROCEDURE — G0378 HOSPITAL OBSERVATION PER HR: HCPCS

## 2023-04-11 PROCEDURE — 25010000002 HEPARIN (PORCINE) 25000-0.45 UT/250ML-% SOLUTION

## 2023-04-11 PROCEDURE — 85025 COMPLETE CBC W/AUTO DIFF WBC: CPT | Performed by: NURSE PRACTITIONER

## 2023-04-11 PROCEDURE — 85730 THROMBOPLASTIN TIME PARTIAL: CPT

## 2023-04-11 PROCEDURE — 96366 THER/PROPH/DIAG IV INF ADDON: CPT

## 2023-04-11 PROCEDURE — 96375 TX/PRO/DX INJ NEW DRUG ADDON: CPT

## 2023-04-11 PROCEDURE — 80074 ACUTE HEPATITIS PANEL: CPT | Performed by: NURSE PRACTITIONER

## 2023-04-11 PROCEDURE — 82962 GLUCOSE BLOOD TEST: CPT

## 2023-04-11 PROCEDURE — 80053 COMPREHEN METABOLIC PANEL: CPT | Performed by: NURSE PRACTITIONER

## 2023-04-11 PROCEDURE — 96367 TX/PROPH/DG ADDL SEQ IV INF: CPT

## 2023-04-11 RX ORDER — HEPARIN SODIUM 1000 [USP'U]/ML
2000 INJECTION, SOLUTION INTRAVENOUS; SUBCUTANEOUS ONCE
Status: COMPLETED | OUTPATIENT
Start: 2023-04-11 | End: 2023-04-11

## 2023-04-11 RX ORDER — ASPIRIN 81 MG/1
81 TABLET ORAL DAILY
Status: DISCONTINUED | OUTPATIENT
Start: 2023-04-12 | End: 2023-04-13 | Stop reason: HOSPADM

## 2023-04-11 RX ORDER — HYDROCODONE BITARTRATE AND ACETAMINOPHEN 5; 325 MG/1; MG/1
1 TABLET ORAL EVERY 6 HOURS PRN
Status: DISCONTINUED | OUTPATIENT
Start: 2023-04-11 | End: 2023-04-13 | Stop reason: HOSPADM

## 2023-04-11 RX ADMIN — HEPARIN SODIUM 16.5 UNITS/KG/HR: 10000 INJECTION, SOLUTION INTRAVENOUS at 10:56

## 2023-04-11 RX ADMIN — HEPARIN SODIUM 2000 UNITS: 1000 INJECTION INTRAVENOUS; SUBCUTANEOUS at 04:39

## 2023-04-11 RX ADMIN — OXYBUTYNIN CHLORIDE 15 MG: 10 TABLET, EXTENDED RELEASE ORAL at 08:12

## 2023-04-11 RX ADMIN — CLONIDINE HYDROCHLORIDE 0.1 MG: 0.1 TABLET ORAL at 08:13

## 2023-04-11 RX ADMIN — METHOCARBAMOL 750 MG: 750 TABLET ORAL at 08:11

## 2023-04-11 RX ADMIN — AZTREONAM 2 G: 2 INJECTION, POWDER, LYOPHILIZED, FOR SOLUTION INTRAMUSCULAR; INTRAVENOUS at 08:25

## 2023-04-11 RX ADMIN — AZTREONAM 2 G: 2 INJECTION, POWDER, LYOPHILIZED, FOR SOLUTION INTRAMUSCULAR; INTRAVENOUS at 16:38

## 2023-04-11 RX ADMIN — AMITRIPTYLINE HYDROCHLORIDE 25 MG: 25 TABLET, FILM COATED ORAL at 20:23

## 2023-04-11 RX ADMIN — TEMAZEPAM 30 MG: 15 CAPSULE ORAL at 20:23

## 2023-04-11 RX ADMIN — FLUTICASONE PROPIONATE 2 SPRAY: 50 SPRAY, METERED NASAL at 08:10

## 2023-04-11 RX ADMIN — METHOCARBAMOL 750 MG: 750 TABLET ORAL at 20:23

## 2023-04-11 RX ADMIN — HYDROCODONE BITARTRATE AND ACETAMINOPHEN 1 TABLET: 5; 325 TABLET ORAL at 05:17

## 2023-04-11 RX ADMIN — METOPROLOL SUCCINATE 100 MG: 100 TABLET, EXTENDED RELEASE ORAL at 08:13

## 2023-04-11 RX ADMIN — HYDRALAZINE HYDROCHLORIDE 25 MG: 25 TABLET, FILM COATED ORAL at 08:13

## 2023-04-11 RX ADMIN — GABAPENTIN 400 MG: 400 CAPSULE ORAL at 20:22

## 2023-04-11 RX ADMIN — APIXABAN 10 MG: 5 TABLET, FILM COATED ORAL at 17:19

## 2023-04-11 RX ADMIN — ATORVASTATIN CALCIUM 20 MG: 20 TABLET, FILM COATED ORAL at 08:11

## 2023-04-11 RX ADMIN — Medication 10 ML: at 08:12

## 2023-04-11 RX ADMIN — GABAPENTIN 400 MG: 400 CAPSULE ORAL at 08:12

## 2023-04-11 RX ADMIN — CITALOPRAM HYDROBROMIDE 20 MG: 20 TABLET ORAL at 08:11

## 2023-04-11 RX ADMIN — PANTOPRAZOLE SODIUM 40 MG: 40 TABLET, DELAYED RELEASE ORAL at 05:17

## 2023-04-11 RX ADMIN — Medication 10 ML: at 20:23

## 2023-04-11 RX ADMIN — HYDROCODONE BITARTRATE AND ACETAMINOPHEN 1 TABLET: 5; 325 TABLET ORAL at 17:59

## 2023-04-11 RX ADMIN — DAPTOMYCIN 400 MG: 500 INJECTION, POWDER, LYOPHILIZED, FOR SOLUTION INTRAVENOUS at 22:53

## 2023-04-11 RX ADMIN — CLONIDINE HYDROCHLORIDE 0.1 MG: 0.1 TABLET ORAL at 20:23

## 2023-04-11 RX ADMIN — HYDRALAZINE HYDROCHLORIDE 25 MG: 25 TABLET, FILM COATED ORAL at 20:22

## 2023-04-11 NOTE — THERAPY EVALUATION
Patient Name: Gaetano Marcelo  : 1960    MRN: [id number]                              Today's Date: 2023       Admit Date: 4/10/2023    Visit Dx:     ICD-10-CM ICD-9-CM   1. Acute deep vein thrombosis (DVT) of axillary vein of right upper extremity  I82.A11 453.84   2. History of osteomyelitis  Z87.39 V13.59     Patient Active Problem List   Diagnosis   • Type 2 diabetes mellitus with complication, without long-term current use of insulin   • Chronic pain disorder   • Depressive disorder   • Morbid obesity   • Gastroesophageal reflux disease without esophagitis   • Hyperlipidemia   • Essential hypertension   • Insomnia   • Low back pain   • GERD (gastroesophageal reflux disease)   • Hypertension   • Osteoarthritis   • Pain, phantom limb   • Peripheral edema   • DVT (deep venous thrombosis)   • Elevated LFTs   • Diarrhea   • Dyspnea     Past Medical History:   Diagnosis Date   • GERD (gastroesophageal reflux disease)    • Hyperlipidemia    • Hypertension    • Insomnia    • Low back pain    • Osteoarthritis    • Pain, phantom limb    • Peripheral edema    • Type 2 diabetes mellitus      Past Surgical History:   Procedure Laterality Date   • APPENDECTOMY     • CARPAL TUNNEL RELEASE Left 2020    Dr. Yosef Diaz   • CHOLECYSTECTOMY     • HERNIA REPAIR     • REPLACEMENT TOTAL KNEE Right    • KANWAL-EN-Y PROCEDURE     • TOE AMPUTATION Right     1st, 2nd Rt toes   • ULNAR NERVE DECOMPRESSION Left 2020    Dr. Yosef Diaz      General Information     Row Name 23 0944          Physical Therapy Time and Intention    Document Type evaluation  -BETH     Mode of Treatment physical therapy  -BETH     Row Name 23 0944          General Information    Patient Profile Reviewed yes  -BETH     Prior Level of Function independent:;bed mobility;ADL's;gait;transfer  -BETH     Existing Precautions/Restrictions fall;partial weight bearing;left  -BETH     Barriers to Rehab medically complex  -BETH     Row Name  04/11/23 0944          Living Environment    People in Home spouse  -BETH     Row Name 04/11/23 0944          Home Main Entrance    Number of Stairs, Main Entrance one  -BETH     Row Name 04/11/23 0944          Cognition    Orientation Status (Cognition) oriented x 4  -BETH     Row Name 04/11/23 0944          Safety Issues, Functional Mobility    Safety Issues Affecting Function (Mobility) insight into deficits/self-awareness;awareness of need for assistance  -BETH     Impairments Affecting Function (Mobility) balance;endurance/activity tolerance;strength  -BETH           User Key  (r) = Recorded By, (t) = Taken By, (c) = Cosigned By    Initials Name Provider Type    Anna Horne, PT Physical Therapist               Mobility     Row Name 04/11/23 0947 04/11/23 0945       Bed Mobility    Bed Mobility -- rolling left;rolling right;scooting/bridging;supine-sit;sit-supine  -BETH    Rolling Left [place] (Bed Mobility) -- modified independence  -BETH    Rolling Right [place] (Bed Mobility) -- modified independence  -BETH    Scooting/Bridging [place] (Bed Mobility) -- modified independence  -BETH    Supine-Sit [place] (Bed Mobility) -- modified independence  -BETH    Sit-Supine [place] (Bed Mobility) -- modified independence  -BETH    Comment, (Bed Mobility) patient takes increeased time and effort to get to the edge of the bed he uses bed rails for support  -BETH --    Row Name 04/11/23 0947          Bed-Chair Transfer    Bed-Chair [place] (Transfers) contact guard  -BETH     Assistive Device (Bed-Chair Transfers) walker, front-wheeled  -BETH     Row Name 04/11/23 0947          Sit-Stand Transfer    Sit-Stand [place] (Transfers) contact guard  -BETH     Assistive Device (Sit-Stand Transfers) walker, front-wheeled  -BETH     Comment, (Sit-Stand Transfer) patient has off loading shoe for left foot walker adjusted for his height  -BETH     Row Name 04/11/23 0947          Gait/Stairs (Locomotion)    [place]  Level (Gait) contact guard  -BETH     Assistive Device (Gait) walker, front-wheeled  -BETH     Distance in Feet (Gait) 120  -BETH     Comment, (Gait/Stairs) patient needs cues for partial weight bearing to left foot due to recent amputation of toe. cues for keeping weight on his heel  -BETH           User Key  (r) = Recorded By, (t) = Taken By, (c) = Cosigned By    Initials Name Provider Type    BETH Anna Sinclair, PT Physical Therapist               Obj/Interventions     Row Name 04/11/23 0951          Range of Motion Comprehensive    General Range of Motion no range of motion deficits identified  -[facility] Name 04/11/23 0951          Strength Comprehensive (MMT)    Comment, General Manual Muscle Testing (MMT) Assessment generalized weakness 4/5  -[facility] Name 04/11/23 0951          Motor Skills    Therapeutic Exercise hip;knee;ankle  -[facility] Name 04/11/23 0951          Hip (Therapeutic Exercise)    Hip (Therapeutic Exercise) AROM (active range of motion)  -     Hip AROM (Therapeutic Exercise) bilateral;flexion;extension;10 repetitions;sitting  -[facility] Name 04/11/23 0951          Knee (Therapeutic Exercise)    Knee (Therapeutic Exercise) AROM (active range of motion)  -     Knee AROM (Therapeutic Exercise) bilateral;flexion;extension;10 repetitions;sitting  -[facility] Name 04/11/23 0951          Ankle (Therapeutic Exercise)    Ankle (Therapeutic Exercise) AROM (active range of motion)  -     Ankle AROM (Therapeutic Exercise) bilateral;dorsiflexion;plantarflexion;10 repetitions;sitting  -[facility] Name 04/11/23 0951          Balance    Balance Assessment sitting static balance;standing static balance;standing dynamic balance  -BETH     Static Sitting Balance independent  -BETH     Position, Sitting Balance unsupported;sitting edge of bed  -BETH     Static Standing Balance contact guard  -BETH     Dynamic Standing Balance contact guard  -BETH     Position/Device Used, Standing Balance supported;walker,  front-wheeled  -BETH           User Key  (r) = Recorded By, (t) = Taken By, (c) = Cosigned By    Initials Name Provider Type    Anna Horne PT Physical Therapist               Goals/Plan     Row Name 04/11/23 0955          Bed Mobility Goal 1 (PT)    Activity/Assistive Device (Bed Mobility Goal 1, PT) bed mobility activities, all  -BETH     [place] Level/Cues Needed (Bed Mobility Goal 1, PT) independent  -BETH     Time Frame (Bed Mobility Goal 1, PT) long term goal (LTG);10 days  -BETH     Progress/Outcomes (Bed Mobility Goal 1, PT) goal ongoing  -BETH     Row Name 04/11/23 0955          Transfer Goal 1 (PT)    Activity/Assistive Device (Transfer Goal 1, PT) sit-to-stand/stand-to-sit  -BETH     [place] Level/Cues Needed (Transfer Goal 1, PT) independent  -BETH     Time Frame (Transfer Goal 1, PT) long term goal (LTG);10 days  -BETH     Progress/Outcome (Transfer Goal 1, PT) goal ongoing  -BETH     Row Name 04/11/23 0955          Gait Training Goal 1 (PT)    Activity/Assistive Device (Gait Training Goal 1, PT) gait (walking locomotion)  -BETH     [place] Level (Gait Training Goal 1, PT) independent  -BETH     Distance (Gait Training Goal 1, PT) 350  -BETH     Time Frame (Gait Training Goal 1, PT) long term goal (LTG);10 days  -BETH     Progress/Outcome (Gait Training Goal 1, PT) goal ongoing  -BETH     Row Name 04/11/23 0955          Therapy Assessment/Plan (PT)    Planned Therapy Interventions (PT) balance training;bed mobility training;gait training;home exercise program;strengthening;transfer training  -BETH           User Key  (r) = Recorded By, (t) = Taken By, (c) = Cosigned By    Initials Name Provider Type    Anna Horne PT Physical Therapist               Clinical Impression     Row Name 04/11/23 0952          Pain    Pretreatment Pain Rating 2/10  -BETH     Posttreatment Pain Rating 2/10  -BETH     Pain Location - Side/Orientation Right  -EBTH     Pain Location upper  -BETH     Pain Location - extremity  -BETH      Pain Intervention(s) Repositioned;Ambulation/increased activity  -BETH     Row Name 04/11/23 0952          Plan of Care Review    Plan of Care Reviewed With patient  -BETH     Progress improving  -BETH     Outcome Evaluation Pt eval is completed. patient presents to the hosp with right UE DVT after recent left toe amputation. patient demonstrates impaired bed mobility  transfers and gait. patient was able to ambulate 120 ft with walker he required cues for partial weight bearing and heel walking. anticipate patient to be able to go home with family assist at D/C  -BETH     Row Name 04/11/23 0952          Therapy Assessment/Plan (PT)    Patient/Family Therapy Goals Statement (PT) go home  -BETH     Rehab Potential (PT) good, to achieve stated therapy goals  -BETH     Criteria for Skilled Interventions Met (PT) yes;skilled treatment is necessary  -BETH     Therapy Frequency (PT) daily  -BETH     Row Name 04/11/23 0952          Vital Signs    Pre Patient Position Supine  -BETH     Intra Patient Position Standing  -BETH     Post Patient Position Supine  -BETH     Row Name 04/11/23 0952          Positioning and Restraints    Pre-Treatment Position in bed  -BETH     Post Treatment Position bed  -BETH     In Bed notified nsg;supine;encouraged to call for assist;call light within reach;with nsg  -BEHT           User Key  (r) = Recorded By, (t) = Taken By, (c) = Cosigned By    Initials Name Provider Type    Anna Horne, PT Physical Therapist               Outcome Measures     Row Name 04/11/23 0955 04/11/23 0800       How much help from another person do you currently need...    Turning from your back to your side while in flat bed without using bedrails? 4  -BETH 4  -CB    Moving from lying on back to sitting on the side of a flat bed without bedrails? 4  -BETH 4  -CB    Moving to and from a bed to a chair (including a wheelchair)? 3  -BETH 4  -CB    Standing up from a chair using your arms (e.g., wheelchair, bedside chair)? 3  -BETH 3  -CB     Climbing 3-5 steps with a railing? 3  -BETH 3  -CB    To walk in hospital room? 3  -BETH 3  -CB    AM-PAC 6 Clicks Score (PT) 20  -BETH 21  -CB    Highest level of mobility 6 --> Walked 10 steps or more  -BETH 6 --> Walked 10 steps or more  -CB          User Key  (r) = Recorded By, (t) = Taken By, (c) = Cosigned By    Initials Name Provider Type    Anna Horne PT Physical Therapist    Ken Jimenez, RN Registered Nurse                             Physical Therapy Education     Title: PT OT SLP Therapies (In Progress)     Topic: Physical Therapy (In Progress)     Point: Mobility training (In Progress)     Learning Progress Summary           Patient Acceptance, E, NR by BETH at 4/11/2023 0900                   Point: Home exercise program (In Progress)     Learning Progress Summary           Patient Acceptance, E, NR by BETH at 4/11/2023 0900                   Point: Body mechanics (In Progress)     Learning Progress Summary           Patient Acceptance, E, NR by BETH at 4/11/2023 0900                   Point: Precautions (In Progress)     Learning Progress Summary           Patient Acceptance, E, NR by  at 4/11/2023 0900                               User Key     Initials Effective Dates Name Provider Type Discipline    BETH 02/03/23 -  Anna Sinclair PT Physical Therapist PT              PT Recommendation and Plan  Planned Therapy Interventions (PT): balance training, bed mobility training, gait training, home exercise program, strengthening, transfer training  Plan of Care Reviewed With: patient  Progress: improving  Outcome Evaluation: Pt eval is completed. patient presents to the hosp with right UE DVT after recent left toe amputation. patient demonstrates impaired bed mobility  transfers and gait. patient was able to ambulate 120 ft with walker he required cues for partial weight bearing and heel walking. anticipate patient to be able to go home with family assist at D/C     Time Calculation:     PT Charges     Row Name 04/11/23 0957             Time Calculation    Start Time 0900  -BETH      PT Received On 04/11/23  -BETH      PT Goal Re-Cert Due Date 04/21/23  -BETH         Untimed Charges    PT Eval/Re-eval Minutes 47  -BETH         Total Minutes    Untimed Charges Total Minutes 47  -BETH       Total Minutes 47  -BETH            User Key  (r) = Recorded By, (t) = Taken By, (c) = Cosigned By    Initials Name Provider Type    Anna Horne, PT Physical Therapist              Therapy Charges for Today     Code Description Service Date Service Provider Modifiers Qty    20335612297 HC PT EVAL MOD COMPLEXITY 4 4/11/2023 Anna Sinclair, PT GP 1          PT G-Codes  AM-PAC 6 Clicks Score (PT): 20  PT Discharge Summary  Anticipated Discharge Disposition (PT): home with assist    Anna Sinclair, PT  4/11/2023

## 2023-04-11 NOTE — NURSING NOTE
Prior to central line removal, order for the removal of catheter was verified, patient was assessed, necessary materials were gathered and patient was educated regarding procedure .    Patient was positioned supine, flat and Trendelenburg to ensure that the insertion site was at or below the level of the heart.    Hands were washed, clean gloves were applied and central line dressing was gently removed. Catheter exit site was not cultured.     A new pair of clean gloves were then applied. Insertion site was cleansed with 2% Chlorhexidine swab using a circular motion beginning at the insertion site and moving outward for 30 seconds and allowed to dry.     Clamp on line was not present.     Patient was instructed to perform Valsalva maneuver as catheter was withdrawn.     The central line was grasped at the insertion site and slowly pulled outward parallel to the skin. Resistance was not met.    After central line was completely removed, a sterile 4x4 gauze pad was used to apply light pressure until bleeding stopped. At that time, petroleum-based gauze and a sterile occlusive dressing was applied to exit site.     Patient was instructed to keep dressing in place for at least 24 hours and to remain in a flat or reclining position for 30 minutes post-removal.     Catheter was inspected after removal and  intact. Tip of central line was not sent for culture. Patient tolerated procedure.

## 2023-04-11 NOTE — PLAN OF CARE
Goal Outcome Evaluation:           Progress: no change  Outcome Evaluation: VSS on room air, heparin gtt infusing, prn meds given for c/o pain at toe amputation site. pt resting in bed at this time.

## 2023-04-11 NOTE — NURSING NOTE
Reason for Wound, Ostomy and Continence (WOC) Nursing Consult: Left toe wound     Patient in bed, alert and oriented x4.  Responded that Dr Urbina has stated to leave Mepitel silicone mesh in place, which is sutured to the wound bed, and Dr Urbina told him to apply Acticoat to wound and secure with roll gauze.  Patient has pedal pulses bilaterally and left foot is warm.      Identified previous partial amputation site to patients left distal third toe, which patient states was amputated due to arterial insufficiency.  Unable to assess wound bed as covered with silicone mesh, which patient states he was told not to remove.  Plantar side of left second toes appears as eschar despite cleansing with NS.  Patient complains of pain with cleansing.  Applied Opticell AG cut to fit around sure conference of second toe and over distal end of residual limb/toe, covered with roll gauze and secured with stockinette.    Had Dr. George paged as patient stated that Dr. Shen was interested in seeing a photo of the wound, also with my concerns with the appearance of plantar side of left third toe.      15:35:  Follow-up, Spoke with Dr George about concern for black eschar and that photos were in the chart.  Dr George appreciated call and responded he will look at and see about the possibility of transferring patient to [facility] for Dr Urbina's care.    Left plantar third toe      Left distal second toe            Most recent Dustin Scale score:  Sensory Perception: 4-->no impairment  Moisture: 4-->rarely moist  Activity: 3-->walks occasionally  Mobility: 3-->slightly limited  Nutrition: 4-->excellent  Friction and Shear: 3-->no apparent problem  Dustin Score: 21 (04/11/23 0800)    Please implement pressure injury prevention interventions per protocol.  See orders for recommendations.    Head to toe assessment completed. Discussed plan of care with RN.      Thank you for consulting the WO Nurse.  WO Team will plan to follow up. If  alteration to skin integrity or change in wound bed presentation, please consult WOC team.    Please note that parts of this note were completed with a voice recognition program. Review of the dictation was done, however, occasionally words are mistranscribed.

## 2023-04-11 NOTE — PLAN OF CARE
Goal Outcome Evaluation:  Plan of Care Reviewed With: patient        Progress: improving  Outcome Evaluation: Pt eval is completed. patient presents to the hosp with right UE DVT after recent left toe amputation. patient demonstrates impaired bed mobility  transfers and gait. patient was able to ambulate 120 ft with walker he required cues for partial weight bearing and heel walking. anticipate patient to be able to go home with family assist at D/C

## 2023-04-11 NOTE — PLAN OF CARE
Goal Outcome Evaluation:           Progress: improving  Outcome Evaluation: Patient has had a decent shift, awake for most of today. VSS, and patient has been alert and oriented times four. No complaints of pain or nausea to the nurse today. Nursing staff will continue to monitor and assess the patient.

## 2023-04-11 NOTE — CONSULTS
Gaetano Marcelo  1960  [id number]    Date of Consult: 4/11/2023    Admit Date:  4/10/2023      Requesting Provider: Nataliia Walker DO  Evaluating Physician: Dawood George MD    Chief Complaint:  Diarrhea, right arm swelling/thrombosis    Reason for Consultation: Diarrhea, right arm thrombosis; left foot infection    History of present illness:     Patient is a 63 y.o.  Yr old male pt of Dr Donohue; with history of diabetes and peripheral arterial disease, follows with Tobin Donohue/Simeon/Carlitos; he had left third toe partial amputation proximally March 29 by Dr. Urbina at [facility], wound culture before that had Enterobacter species.  Pathology did have osteomyelitis at the distal phalanx but bone margin was free of inflammation per pathology and per my discussion with Dr. Urbina, he reports bone focus removal.  Patient was transitioned to IV daptomycin/Invanz for ongoing antibiotic therapy by Dr. Donohue.  He was seen in the office April 10 with swollen right arm, sent for stat duplex with evidence for subacute right upper extremity DVT in the subclavian/axillary vein and subacute right upper extremity superficial thrombophlebitis in the basilic vein.  He was admitted to the hospital.    Left foot with some dull discomfort, intermittent, nonradiating, worse with pressure, better with pain meds and 2-3 out of 10 in severity, worse near the third toe surgical site.  No new redness or drainage. Right arm vaguely swollen with the PICC line but not progressive since admission.  Denies any pain there    No headache photophobia or neck stiffness.  No shortness of breath cough or hemoptysis.  No nausea or vomiting.  He has had diarrhea, 2-3 times per day and worse with eating, worse while on antibiotics and general.  Denies hematochezia melena or hematemesis.  No abdominal pain at present.  No dysuria hematuria or pyuria.  No other new skin rash.    No raw or undercooked food.  No unpasteurized milk or milk  products.  No sheep/goat/cattle/livestock exposure.No animal insect or arthropod exposure.  No tick bites.  No outdoor camping or hunting exposure.  No travel exposure.  No ill exposure.  No history of TB or TB exposure.   Denies a history of MRSA/VRE and no history of C. difficile or ESBL/KPC  organisms.    Past Medical History:   Diagnosis Date   • GERD (gastroesophageal reflux disease)    • Hyperlipidemia    • Hypertension    • Insomnia    • Low back pain    • Osteoarthritis    • Pain, phantom limb    • Peripheral edema    • Type 2 diabetes mellitus        Past Surgical History:   Procedure Laterality Date   • APPENDECTOMY     • CARPAL TUNNEL RELEASE Left 08/18/2020    Dr. Yosef Diaz   • CHOLECYSTECTOMY     • HERNIA REPAIR     • REPLACEMENT TOTAL KNEE Right    • KANWAL-EN-Y PROCEDURE  2004   • TOE AMPUTATION Right 2013    1st, 2nd Rt toes   • ULNAR NERVE DECOMPRESSION Left 08/18/2020    Dr. Yosef Diaz       Pediatric History   Patient Parents   • Not on file     Other Topics Concern   • Not on file   Social History Narrative   • Not on file       family history includes Coronary artery disease in his father; Diabetes in his brother, father, mother, and paternal grandmother.    Allergies   Allergen Reactions   • Hydrochlorothiazide Rash   • Penicillins Rash       Medication:  Current Facility-Administered Medications   Medication Dose Route Frequency Provider Last Rate Last Admin   • amitriptyline (ELAVIL) tablet 25 mg  25 mg Oral Nightly Sheela Rich APRN   25 mg at 04/10/23 2136   • atorvastatin (LIPITOR) tablet 20 mg  20 mg Oral Daily Sheela Rich APRN       • citalopram (CeleXA) tablet 20 mg  20 mg Oral Daily Sheela Rich APRN       • cloNIDine (CATAPRES) tablet 0.1 mg  0.1 mg Oral Q12H Sheela Rich APRN   0.1 mg at 04/10/23 2136   • DAPTOmycin (CUBICIN) 400 mg in sodium chloride 0.9 % 50 mL IVPB  400 mg Intravenous Q24H Sheela Rich APRN 100 mL/hr at 04/10/23  2210 400 mg at 04/10/23 2210   • dextrose (D50W) (25 g/50 mL) IV injection 25 g  25 g Intravenous Q15 Min PRN Sheela Rich APRN       • dextrose (GLUTOSE) oral gel 15 g  15 g Oral Q15 Min PRN Sheela Rich APRRADHA       • fluticasone (FLONASE) 50 MCG/ACT nasal spray 2 spray  2 spray Each Nare Daily Sheela Rich APRN       • gabapentin (NEURONTIN) capsule 400 mg  400 mg Oral Q12H Sheela Rich APRN   400 mg at 04/10/23 2136   • glucagon (GLUCAGEN) injection 1 mg  1 mg Intramuscular Q15 Min PRN Sheela Rich APRN       • heparin 02593 units/250 mL (100 units/mL) in 0.45 % NaCl infusion  16.5 Units/kg/hr Intravenous Titrated Dawood Randolph, [facility] 18.64 mL/hr at 04/11/23 0700 16.5 Units/kg/hr at 04/11/23 0700   • hydrALAZINE (APRESOLINE) tablet 25 mg  25 mg Oral Q12H Sheela Rich APRN   25 mg at 04/10/23 2136   • HYDROcodone-acetaminophen (NORCO) 5-325 MG per tablet 1 tablet  1 tablet Oral Q6H PRN Sheela Rich APRN   1 tablet at 04/11/23 0517   • Insulin Lispro (humaLOG) injection 0-7 Units  0-7 Units Subcutaneous TID AC Sheela Rich APRN       • methocarbamol (ROBAXIN) tablet 750 mg  750 mg Oral Q12H Sheela Rich APRN   750 mg at 04/10/23 2136   • metoprolol succinate XL (TOPROL-XL) 24 hr tablet 100 mg  100 mg Oral Q24H Sheela Rich APRN       • oxybutynin XL (DITROPAN-XL) 24 hr tablet 15 mg  15 mg Oral Daily Sheela Rich APRN       • pantoprazole (PROTONIX) EC tablet 40 mg  40 mg Oral Q AM Sheela Rich APRN   40 mg at 04/11/23 0517   • Pharmacy to Dose Heparin   Does not apply Continuous PRN Juan Abbasi MD       • sodium chloride 0.9 % flush 10 mL  10 mL Intravenous PRN Juan Abbasi MD       • sodium chloride 0.9 % flush 10 mL  10 mL Intravenous Q12H Sheela Rich APRN   10 mL at 04/10/23 2136   • sodium chloride 0.9 % flush 10 mL  10 mL Intravenous PRN Sheela Rich APRN       • sodium  "chloride 0.9 % infusion 40 mL  40 mL Intravenous PRN Sheela Rich APRN       • temazepam (RESTORIL) capsule 30 mg  30 mg Oral Nightly Sheela Rich APRN   30 mg at 04/10/23 2136       Antibiotics:  Anti-Infectives (From admission, onward)    Ordered     Dose/Rate Route Frequency Start Stop    04/10/23 2032  DAPTOmycin (CUBICIN) 400 mg in sodium chloride 0.9 % 50 mL IVPB        Ordering Provider: Sheela Rich APRN    400 mg  100 mL/hr over 30 Minutes Intravenous Every 24 Hours 04/10/23 2300 04/20/23 2259            Review of Systems    Constitutional-- No Fever, chills or sweats.  Appetite good, and no malaise. No fatigue.  Heent-- No new vision, hearing or throat complaints.  No epistaxis or oral sores.  Denies odynophagia or dysphagia.  No flashers, floaters or eye pain. No odynophagia or dysphagia. No headache, photophobia or neck stiffness.  CV-- No chest pain, palpitation or syncope  Resp-- No SOB/cough/Hemoptysis  GI-  See above.  No hematochezia, melena, or hematemesis. Denies jaundice or chronic liver disease.  -- No dysuria, hematuria, or flank pain.  Denies hesitancy, urgency or flank pain.  Lymph- no swollen lymph nodes in neck/axilla or groin.   Heme- No active bruising or bleeding   MS-- no swelling or pain in the bones or joints of arms/legs aside from above.  No new back pain.  Neuro-- No acute focal weakness or numbness in the arms or legs.  No seizures.    Full 12 point review of systems reviewed and negative otherwise for acute complaints, except for above    Physical Exam: nursing/chaperone present  Vital Signs   /75 (BP Location: Left arm, Patient Position: Lying)   Pulse 89   Temp 98.5 °F (36.9 °C) (Oral)   Resp 18   Ht 182.9 cm (72\")   Wt 113 kg (248 lb 14.4 oz)   SpO2 96%   BMI 33.76 kg/m²     GENERAL: Awake and alert, in no acute distress.   HEENT: Normocephalic, atraumatic.  PERRL. EOMI. No conjunctival injection. No icterus. Oropharynx clear without " evidence of thrush or exudate. No evidence of peridontal disease.    NECK: Supple without nuchal rigidity. No mass.  LYMPH: No cervical, axillary or inguinal lymphadenopathy.  HEART: RRR; No murmur, rubs, gallops.   LUNGS: Clear to auscultation bilaterally without wheezing, rales, rhonchi. Normal respiratory effort. Nonlabored. No dullness.  ABDOMEN: Soft, nontender, nondistended. Positive bowel sounds. No rebound or guarding. NO mass or HSM.  EXT:  No cyanosis, clubbing ;No cord.  : Genitalia generally unremarkable.     MSK: FROM without joint effusions noted arms/legs.    SKIN: Warm and dry without cutaneous eruptions on Inspection/palpation.    NEURO: Oriented to PPT.  He has difficult time cooperating with a detailed motor or sensory exam given positioning in bed  PSYCHIATRIC: Normal insight and judgement. Cooperative with PE    Left foot noted, surgical site with no obvious purulent drainage.  Mild erythema at the remaining portion of his third digit but no discrete mass bulge or fluctuance.  No crepitus or bulla.    No peripheral stigmata such phenomena of endocarditis    Right arm PICC line without obvious erythema or drainage at the exit site but with vague edema of the right arm, upper arm.  Capillary refill okay with no loss of sensation in his fingertips and strength intact of the arm with no motor deficits    Laboratory Data    Results from last 7 days   Lab Units 04/11/23  0256 04/10/23  2009   WBC 10*3/mm3 5.48 6.29   HEMOGLOBIN g/dL 10.6* 11.8*   HEMATOCRIT % 33.8* 37.7   PLATELETS 10*3/mm3 277 303     Results from last 7 days   Lab Units 04/11/23 0256   SODIUM mmol/L 142   POTASSIUM mmol/L 3.8   CHLORIDE mmol/L 110*   CO2 mmol/L 25.0   BUN mg/dL 8   CREATININE mg/dL 0.94   GLUCOSE mg/dL 98   CALCIUM mg/dL 8.0*     Results from last 7 days   Lab Units 04/11/23 0256   ALK PHOS U/L 86   BILIRUBIN mg/dL 0.2   ALT (SGPT) U/L 180*   AST (SGOT) U/L 222*               Estimated Creatinine Clearance:  101.7 mL/min (by C-G formula based on SCr of 0.94 mg/dL).      Microbiology:      Radiology:  Imaging Results (Last 72 Hours)     Procedure Component Value Units Date/Time    CT Angiogram Chest [[id number]] Collected: 04/10/23 2150     Updated: 04/10/23 2203    Narrative:      CT ABDOMEN PELVIS WO CONTRAST, CT ANGIOGRAM CHEST    Date of Exam: 4/10/2023 9:09 PM EDT    Indication: diarrhea x 2 weeks, elevated LFT's.   The  Comparison: None available.    Technique: Axial CT images were obtained of the abdomen and pelvis without the administration of contrast. Reconstructed coronal and sagittal images were also obtained. Automated exposure control and iterative construction methods were used.    Findings:  CTA chest: There is no pathologic axillary adenopathy or other worrisome body wall soft tissue finding in the chest. No acute findings are present in the partially characterized upper abdomen. There is no pleural or pericardial effusion. There is no   pathologic mediastinal adenopathy. Mildly atherosclerotic, nonaneurysmal thoracic aorta. The pulmonary arteries are well-opacified and without evidence of focal filling defect concerning for acute pulmonary embolus. Evaluation of the osseous structures   demonstrates no evidence of acute fracture or aggressive osseous lesion, with multilevel thoracic spondylosis change present. Evaluation of the lung fields demonstrate some mild scattered atelectasis, otherwise without evidence of acute infectious or   inflammatory process. There are no distinct suspicious pulmonary nodules.    Abdomen and pelvis: The lung bases are grossly clear. Evaluation of the body wall soft tissues demonstrates moderate diffuse anasarca. The liver, spleen, pancreas and bilateral adrenal glands demonstrate no acute or suspicious findings. The pancreas is   diffusely atrophic. Postoperative changes are present from prior gastric bypass. Small and large bowel loops are nondilated. There is no  suspicious focal bowel wall thickening. There is no free fluid or pneumoperitoneum. Atherosclerotic abdominal aorta.   Air-fluid levels are present consistent with provided history of diarrheal state. The pelvic viscera demonstrate no acute findings. The kidneys demonstrate no evidence of stones, hydronephrosis or nephropathy.      Impression:      Impression:     No acute pulmonary embolus or other acute finding in the chest.    Moderate diffuse anasarca is present. There are also air-fluid levels present throughout nondistended segments of bowel consistent with diarrheal state. No acute findings in the abdomen or pelvis otherwise.      Electronically Signed: Shahid Ash    4/10/2023 10:00 PM EDT    Workstation ID: [id number]    CT Abdomen Pelvis Without Contrast [[id number]] Collected: 04/10/23 2150     Updated: 04/10/23 2203    Narrative:      CT ABDOMEN PELVIS WO CONTRAST, CT ANGIOGRAM CHEST    Date of Exam: 4/10/2023 9:09 PM EDT    Indication: diarrhea x 2 weeks, elevated LFT's.   The  Comparison: None available.    Technique: Axial CT images were obtained of the abdomen and pelvis without the administration of contrast. Reconstructed coronal and sagittal images were also obtained. Automated exposure control and iterative construction methods were used.    Findings:  CTA chest: There is no pathologic axillary adenopathy or other worrisome body wall soft tissue finding in the chest. No acute findings are present in the partially characterized upper abdomen. There is no pleural or pericardial effusion. There is no   pathologic mediastinal adenopathy. Mildly atherosclerotic, nonaneurysmal thoracic aorta. The pulmonary arteries are well-opacified and without evidence of focal filling defect concerning for acute pulmonary embolus. Evaluation of the osseous structures   demonstrates no evidence of acute fracture or aggressive osseous lesion, with multilevel thoracic spondylosis change present. Evaluation of the lung  fields demonstrate some mild scattered atelectasis, otherwise without evidence of acute infectious or   inflammatory process. There are no distinct suspicious pulmonary nodules.    Abdomen and pelvis: The lung bases are grossly clear. Evaluation of the body wall soft tissues demonstrates moderate diffuse anasarca. The liver, spleen, pancreas and bilateral adrenal glands demonstrate no acute or suspicious findings. The pancreas is   diffusely atrophic. Postoperative changes are present from prior gastric bypass. Small and large bowel loops are nondilated. There is no suspicious focal bowel wall thickening. There is no free fluid or pneumoperitoneum. Atherosclerotic abdominal aorta.   Air-fluid levels are present consistent with provided history of diarrheal state. The pelvic viscera demonstrate no acute findings. The kidneys demonstrate no evidence of stones, hydronephrosis or nephropathy.      Impression:      Impression:     No acute pulmonary embolus or other acute finding in the chest.    Moderate diffuse anasarca is present. There are also air-fluid levels present throughout nondistended segments of bowel consistent with diarrheal state. No acute findings in the abdomen or pelvis otherwise.      Electronically Signed: Shahid Ash    4/10/2023 10:00 PM EDT    Workstation ID: [id number]            Impression:     --left foot/third toe infection with osteomyelitis at the left third distal phalanx, surgery approximately March 29 and bone focus removal per discussion with Dr. Urbina with bone margin free of inflammation per pathology.  Preoperative culture with Enterobacter species, high risk for mixed infection in the setting of prior gangrene.  Empiric antibiotics ongoing for remaining soft tissue infection, adjusted to daptomycin/Azactam.  Possible antibiotic intolerances as outlined below.  Further antibiotic decisions to depend on clinical course/study results and response to therapy.  High risk for further  "serious morbidity and other serious sequela including persistent/recurrent or nonhealing wounds, persistence of progressive or recurrent infection and risk for further functional/limb loss, higher-level amputation etc.    --Diarrhea.  Acute/subacute.  C. Difficile negative.  GI PCR pending.  Abdomen otherwise benign and nontender at present.CT scan reports anasarca but no acute findings in the abdomen aside from changes suggested of \"diarrheal state\".    --abnormal LFTs.  Unclear if related to antimicrobials versus systemic process.  Invanz empirically stopped and monitor    --Acute right upper extremity DVT/SVT.  On anticoagulation per medicine team and PICC line to be removed.    --diabetes.  Glucose control per medicine team    --Peripheral vascular disease.  Extent of disease unclear and follows with Dr. Hendrix    PLAN: Thank you for asking us to see Gaetano Marcelo, I recommend the following:    --IV daptomycin/Azactam    --Invanz stopped    --Check/review labs cultures and scans    --Discussed with microbiology    --Discussed with pharmacy    --Discussed with Dr. Urbina and with Dr. Vo; Dr Vo is coordinating PICC line removal/IV access etc.    --left message for Dr Donohue to discuss    --Highly complex at of issues with high risk for further serious morbidity and other serious sequela       Dawood George MD  4/11/2023              "

## 2023-04-11 NOTE — ED PROVIDER NOTES
Subjective   History of Present Illness  63-year-old male presents for evaluation of right arm pain and swelling.  Of note, the patient was admitted to [facility] in late March for a diabetic foot infection of his left foot.  A PICC line was placed in his right upper extremity at that time and he has been receiving IV antibiotics through his PICC line in his right arm now for nearly 2 weeks at home.  Additionally, nearly 2 weeks ago the patient was started on low-dose Xarelto of 2.5 mg.  Over the past 2 days, the patient began experiencing pain and swelling to his right upper extremity.  He contacted his infectious disease physician, Dr. Donohue, regarding his symptoms and had an outpatient Doppler ultrasound today that was positive for right upper extremity DVT.  He was subsequently sent to the emergency department to be evaluated.  He currently rates his pain at 5 out of 10 in severity.  He denies any fevers.        Review of Systems   Musculoskeletal:        Right arm pain and swelling   All other systems reviewed and are negative.      Past Medical History:   Diagnosis Date   • GERD (gastroesophageal reflux disease)    • Hyperlipidemia    • Hypertension    • Insomnia    • Low back pain    • Osteoarthritis    • Pain, phantom limb    • Peripheral edema    • Type 2 diabetes mellitus        Allergies   Allergen Reactions   • Hydrochlorothiazide Rash   • Penicillins Rash       Past Surgical History:   Procedure Laterality Date   • APPENDECTOMY     • CARPAL TUNNEL RELEASE Left 08/18/2020    Dr. Yosef Diaz   • CHOLECYSTECTOMY     • HERNIA REPAIR     • REPLACEMENT TOTAL KNEE Right    • KANWAL-EN-Y PROCEDURE  2004   • TOE AMPUTATION Right 2013    1st, 2nd Rt toes   • ULNAR NERVE DECOMPRESSION Left 08/18/2020    Dr. Yosef Diaz       Family History   Problem Relation Age of Onset   • Diabetes Mother    • Diabetes Brother    • Diabetes Paternal Grandmother    • Diabetes Father    • Coronary artery disease Father         Social History     Socioeconomic History   • Marital status:    Tobacco Use   • Smoking status: Never   • Smokeless tobacco: Never   Vaping Use   • Vaping Use: Never used   Substance and Sexual Activity   • Alcohol use: Not Currently   • Drug use: Never   • Sexual activity: Defer           Objective   Physical Exam  Vitals and nursing note reviewed.   Constitutional:       General: He is not in acute distress.     Appearance: He is well-developed. He is not diaphoretic.      Comments: Nontoxic-appearing male   HENT:      Head: Normocephalic and atraumatic.      Comments: No mucous membrane lesions  Neck:      Vascular: No JVD.   Cardiovascular:      Rate and Rhythm: Normal rate and regular rhythm.      Heart sounds: Normal heart sounds. No murmur heard.    No friction rub. No gallop.   Pulmonary:      Effort: Pulmonary effort is normal. No respiratory distress.      Breath sounds: Normal breath sounds. No wheezing or rales.   Abdominal:      General: Bowel sounds are normal. There is no distension.      Palpations: Abdomen is soft. There is no mass.      Tenderness: There is no abdominal tenderness. There is no guarding.   Musculoskeletal:      Comments: On visual inspection, there is mild soft tissue swelling noted to right upper arm and right forearm when compared to the left, no palpable cords noted   Skin:     General: Skin is warm and dry.      Coloration: Skin is not pale.      Findings: No erythema or rash.      Comments: No warmth or erythema noted to PICC line insertion site to right upper extremity   Neurological:      Mental Status: He is alert and oriented to person, place, and time.      Comments: Right upper extremity is neurovascularly intact distally with bounding distal pulses and normal sensation, normal  strength noted   Psychiatric:         Thought Content: Thought content normal.         Judgment: Judgment normal.         Procedures           ED Course  ED Course as of 04/11/23  0209   Mon Apr 10, 2023   1824 63-year-old male presents for evaluation of right arm pain and swelling.  Of note, the patient was admitted to [facility] in late March for a diabetic foot infection of his left foot.  A PICC line was placed in his right upper extremity at that time and he has been receiving IV antibiotic infusions since that time.  Nearly 2 weeks ago, he was started on low-dose Xarelto 2.5 mg.  Over the past 2 days the patient began experiencing pain and swelling to his right upper extremity.  He contacted his infectious disease physician, Dr. Donohue, regarding his symptoms and had an outpatient Doppler ultrasound today that revealed a right DVT. [DD]   1833 He was subsequently sent to the emergency department for evaluation.  On arrival, the patient is nontoxic-appearing.  Right upper extremity is neurovascularly intact distally with bounding distal pulses and normal sensation.  He is clearly failing outpatient NOAC treatment at this time.  He will need his PICC line removed and replaced to continue his IV antibiotic infusions.  I discussed the patient's case with Dr. Walker, the patient will be admitted under her care for further evaluation and treatment.  Heparin initiated per her request.  The patient is hemodynamically stable at this time and is aware/agreeable with the plan. [DD]      ED Course User Index  [DD] Juan Abbasi MD                                          Recent Results (from the past 24 hour(s))   Clostridioides difficile Toxin, PCR - Stool, Per Rectum    Collection Time: 04/10/23  3:34 PM    Specimen: Per Rectum; Stool   Result Value Ref Range    C. Difficile Toxins by PCR Not Detected Not Detected   Duplex venous upper extremity right CAR    Collection Time: 04/10/23  4:28 PM   Result Value Ref Range    Target HR (85%) 132 bpm    Max. Pred. HR (100%) 155 bpm    Right Subclavian Color 1.0     Right Axillary Color 1.0     Right Basilic Upper Color 1.0     Right Internal  Jugular Spont Y     Right Internal Jugular Phasic Y     Right Subclavian Spont N     Right Subclavian Phasic N     Right Subclavian Compress P     Right Axillary Spont D     Right Axillary Phasic D     Right Axillary Compress P     Right Brachial Compress C     Right Radial Compress C     Right Ulnar Compress C     Right Basilic Upper Spont D     Right Basilic Upper Phasic D     Right Basilic Upper Compress P     Right Basilic Forearm Compress C     Right Cephalic Upper Compress C     Right Cephalic Forearm Compress C     BH CV VAS PRELIMINARY FINDINGS SCRIPTING 1.0    Comprehensive Metabolic Panel    Collection Time: 04/10/23  8:09 PM    Specimen: Blood   Result Value Ref Range    Glucose 98 65 - 99 mg/dL    BUN 8 8 - 23 mg/dL    Creatinine 0.78 0.76 - 1.27 mg/dL    Sodium 143 136 - 145 mmol/L    Potassium 4.1 3.5 - 5.2 mmol/L    Chloride 109 (H) 98 - 107 mmol/L    CO2 24.0 22.0 - 29.0 mmol/L    Calcium 8.6 8.6 - 10.5 mg/dL    Total Protein 5.6 (L) 6.0 - 8.5 g/dL    Albumin 3.1 (L) 3.5 - 5.2 g/dL    ALT (SGPT) 199 (H) 1 - 41 U/L    AST (SGOT) 220 (H) 1 - 40 U/L    Alkaline Phosphatase 96 39 - 117 U/L    Total Bilirubin 0.2 0.0 - 1.2 mg/dL    Globulin 2.5 gm/dL    A/G Ratio 1.2 g/dL    BUN/Creatinine Ratio 10.3 7.0 - 25.0    Anion Gap 10.0 5.0 - 15.0 mmol/L    eGFR 99.0 >60.0 mL/min/1.73   CBC Auto Differential    Collection Time: 04/10/23  8:09 PM    Specimen: Blood   Result Value Ref Range    WBC 6.29 3.40 - 10.80 10*3/mm3    RBC 4.22 4.14 - 5.80 10*6/mm3    Hemoglobin 11.8 (L) 13.0 - 17.7 g/dL    Hematocrit 37.7 37.5 - 51.0 %    MCV 89.3 79.0 - 97.0 fL    MCH 28.0 26.6 - 33.0 pg    MCHC 31.3 (L) 31.5 - 35.7 g/dL    RDW 15.6 (H) 12.3 - 15.4 %    RDW-SD 51.3 37.0 - 54.0 fl    MPV 9.7 6.0 - 12.0 fL    Platelets 303 140 - 450 10*3/mm3    Neutrophil % 60.7 42.7 - 76.0 %    Lymphocyte % 25.8 19.6 - 45.3 %    Monocyte % 9.7 5.0 - 12.0 %    Eosinophil % 2.5 0.3 - 6.2 %    Basophil % 0.8 0.0 - 1.5 %    Immature Grans %  0.5 0.0 - 0.5 %    Neutrophils, Absolute 3.82 1.70 - 7.00 10*3/mm3    Lymphocytes, Absolute 1.62 0.70 - 3.10 10*3/mm3    Monocytes, Absolute 0.61 0.10 - 0.90 10*3/mm3    Eosinophils, Absolute 0.16 0.00 - 0.40 10*3/mm3    Basophils, Absolute 0.05 0.00 - 0.20 10*3/mm3    Immature Grans, Absolute 0.03 0.00 - 0.05 10*3/mm3    nRBC 0.0 0.0 - 0.2 /100 WBC   Heparin Anti-Xa    Collection Time: 04/10/23  8:09 PM    Specimen: Blood   Result Value Ref Range    Heparin Anti-Xa (UFH) 0.39 0.30 - 0.70 IU/ml   Protime-INR    Collection Time: 04/10/23  8:09 PM    Specimen: Blood   Result Value Ref Range    Protime 14.1 11.4 - 14.4 Seconds    INR 1.10 0.84 - 1.13   aPTT    Collection Time: 04/10/23  8:09 PM    Specimen: Blood   Result Value Ref Range    PTT 34.9 (L) 60.0 - 90.0 seconds     Note: In addition to lab results from this visit, the labs listed above may include labs taken at another facility or during a different encounter within the last 24 hours. Please correlate lab times with ED admission and discharge times for further clarification of the services performed during this visit.    CT Angiogram Chest   Final Result   Impression:       No acute pulmonary embolus or other acute finding in the chest.      Moderate diffuse anasarca is present. There are also air-fluid levels present throughout nondistended segments of bowel consistent with diarrheal state. No acute findings in the abdomen or pelvis otherwise.         Electronically Signed: [employer]     4/10/2023 10:00 PM EDT     Workstation ID: [id number]      CT Abdomen Pelvis Without Contrast   Final Result   Impression:       No acute pulmonary embolus or other acute finding in the chest.      Moderate diffuse anasarca is present. There are also air-fluid levels present throughout nondistended segments of bowel consistent with diarrheal state. No acute findings in the abdomen or pelvis otherwise.         Electronically Signed: [employer]     4/10/2023 10:00 PM  "EDT     Workstation ID: [id number]        Vitals:    04/10/23 1950 04/10/23 2355 04/11/23 0100 04/11/23 0200   BP: (!) 182/115 99/71     BP Location: Left arm Left arm     Patient Position: Lying Lying     Pulse: 78 84 84 92   Resp: 18 18     Temp: 97.8 °F (36.6 °C) 97.7 °F (36.5 °C)     TempSrc: Oral Oral     SpO2: 97% 92% 93%    Weight: 113 kg (248 lb 14.4 oz)      Height: 182.9 cm (72\")        Medications   sodium chloride 0.9 % flush 10 mL (has no administration in time range)   heparin 29986 units/250 mL (100 units/mL) in 0.45 % NaCl infusion ( Intravenous Canceled Entry 4/10/23 2115)   Pharmacy to Dose Heparin (has no administration in time range)   amitriptyline (ELAVIL) tablet 25 mg (25 mg Oral Given 4/10/23 2136)   atorvastatin (LIPITOR) tablet 20 mg (has no administration in time range)   citalopram (CeleXA) tablet 20 mg (has no administration in time range)   cloNIDine (CATAPRES) tablet 0.1 mg (0.1 mg Oral Given 4/10/23 2136)   pantoprazole (PROTONIX) EC tablet 40 mg (has no administration in time range)   fluticasone (FLONASE) 50 MCG/ACT nasal spray 2 spray (has no administration in time range)   gabapentin (NEURONTIN) capsule 400 mg (400 mg Oral Given 4/10/23 2136)   hydrALAZINE (APRESOLINE) tablet 25 mg (25 mg Oral Given 4/10/23 2136)   methocarbamol (ROBAXIN) tablet 750 mg (750 mg Oral Given 4/10/23 2136)   metoprolol succinate XL (TOPROL-XL) 24 hr tablet 100 mg (has no administration in time range)   oxybutynin XL (DITROPAN-XL) 24 hr tablet 15 mg (has no administration in time range)   temazepam (RESTORIL) capsule 30 mg (30 mg Oral Given 4/10/23 2136)   ertapenem (INVanz) 1 g/100 mL 0.9% NS VTB (mbp) (has no administration in time range)   DAPTOmycin (CUBICIN) 400 mg in sodium chloride 0.9 % 50 mL IVPB (400 mg Intravenous New Bag 4/10/23 2210)   sodium chloride 0.9 % flush 10 mL (10 mL Intravenous Given 4/10/23 2136)   sodium chloride 0.9 % flush 10 mL (has no administration in time range)   sodium " chloride 0.9 % infusion 40 mL (has no administration in time range)   dextrose (GLUTOSE) oral gel 15 g (has no administration in time range)   dextrose (D50W) (25 g/50 mL) IV injection 25 g (has no administration in time range)   glucagon (GLUCAGEN) injection 1 mg (has no administration in time range)   Insulin Lispro (humaLOG) injection 0-7 Units (has no administration in time range)   iopamidol (ISOVUE-370) 76 % injection 100 mL (85 mL Intravenous Given 4/10/23 2120)     ECG/EMG Results (last 24 hours)     ** No results found for the last 24 hours. **        No orders to display           MDM    Final diagnoses:   Acute deep vein thrombosis (DVT) of axillary vein of right upper extremity   History of osteomyelitis       ED Disposition  ED Disposition     ED Disposition   Decision to Admit    Condition   --    Comment   Level of Care: Telemetry [5]   Diagnosis: DVT (deep venous thrombosis) [590855]   Admitting Physician: ALICE COBIAN [[id number]]               No follow-up provider specified.       Medication List      No changes were made to your prescriptions during this visit.          Juan Abbasi MD  04/11/23 0216

## 2023-04-11 NOTE — PROGRESS NOTES
[facility] Medicine Services  PROGRESS NOTE    Patient Name: Gaetano Marcelo  : 1960  MRN: [id number]    Date of Admission: 4/10/2023  Primary Care Physician: Juan Mcclain MD    Subjective   Subjective     CC:    DVT    HPI:  RUE swollen but not painful. No fever or chills.     ROS:  Gen: no fever  Pulm: no current cough  GI: no nausea, but ongoing watery loose stool    Objective   Objective     Vital Signs:   Temp:  [97.7 °F (36.5 °C)-98.7 °F (37.1 °C)] 98.6 °F (37 °C)  Heart Rate:  [78-92] 85  Resp:  [18-20] 18  BP: ()/() 103/56     Physical Exam:  Constitutional - no acute distress, nontoxic, in bed  HEENT-NCAT, mucous membranes moist  CV-RRR  Resp-CTAB  Abd-soft, nontender, nondistended, normoactive bowel sounds  Ext-RUE edema  Lines- PICC RUE  Neuro-alert, speech clear, moves all extremities   Psych-normal affect   Skin- dressing on left foot      Results Reviewed:  LAB RESULTS:      Lab 23  0949 23  0256 04/10/23  2009   WBC  --  5.48 6.29   HEMOGLOBIN  --  10.6* 11.8*   HEMATOCRIT  --  33.8* 37.7   PLATELETS  --  277 303   NEUTROS ABS  --  3.15 3.82   IMMATURE GRANS (ABS)  --  0.02 0.03   LYMPHS ABS  --  1.52 1.62   MONOS ABS  --  0.56 0.61   EOS ABS  --  0.20 0.16   MCV  --  89.4 89.3   PROTIME  --   --  14.1   APTT 106.6* 50.5* 34.9*   HEPARIN ANTI-XA  --   --  0.39         Lab 23  0256 04/10/23  2009   SODIUM 142 143   POTASSIUM 3.8 4.1   CHLORIDE 110* 109*   CO2 25.0 24.0   ANION GAP 7.0 10.0   BUN 8 8   CREATININE 0.94 0.78   EGFR 90.0 99.0   GLUCOSE 98 98   CALCIUM 8.0* 8.6         Lab 23  0256 04/10/23  2009   TOTAL PROTEIN 5.0* 5.6*   ALBUMIN 2.6* 3.1*   GLOBULIN 2.4 2.5   ALT (SGPT) 180* 199*   AST (SGOT) 222* 220*   BILIRUBIN 0.2 0.2   ALK PHOS 86 96         Lab 04/10/23  2009   PROTIME 14.1   INR 1.10                 Brief Urine Lab Results     None          Microbiology Results Abnormal     Procedure Component Value -  Date/Time    Gastrointestinal Panel, PCR - Stool, Per Rectum [[id number]]  (Normal) Collected: 04/10/23 2100    Lab Status: Final result Specimen: Stool from Per Rectum Updated: 04/11/23 0948     Campylobacter Not Detected     Plesiomonas shigelloides Not Detected     Salmonella Not Detected     Vibrio Not Detected     Vibrio cholerae Not Detected     Yersinia enterocolitica Not Detected     Enteroaggregative E. coli (EAEC) Not Detected     Enteropathogenic E. coli (EPEC) Not Detected     Enterotoxigenic E. coli (ETEC) lt/st Not Detected     Shiga-like toxin-producing E. coli (STEC) stx1/stx2 Not Detected     Shigella/Enteroinvasive E. coli (EIEC) Not Detected     Cryptosporidium Not Detected     Cyclospora cayetanensis Not Detected     Entamoeba histolytica Not Detected     Giardia lamblia Not Detected     Adenovirus F40/41 Not Detected     Astrovirus Not Detected     Norovirus GI/GII Not Detected     Rotavirus A Not Detected     Sapovirus (I, II, IV or V) Not Detected          CT Abdomen Pelvis Without Contrast    Result Date: 4/10/2023  CT ABDOMEN PELVIS WO CONTRAST, CT ANGIOGRAM CHEST Date of Exam: 4/10/2023 9:09 PM EDT Indication: diarrhea x 2 weeks, elevated LFT's. The Comparison: None available. Technique: Axial CT images were obtained of the abdomen and pelvis without the administration of contrast. Reconstructed coronal and sagittal images were also obtained. Automated exposure control and iterative construction methods were used. Findings: CTA chest: There is no pathologic axillary adenopathy or other worrisome body wall soft tissue finding in the chest. No acute findings are present in the partially characterized upper abdomen. There is no pleural or pericardial effusion. There is no pathologic mediastinal adenopathy. Mildly atherosclerotic, nonaneurysmal thoracic aorta. The pulmonary arteries are well-opacified and without evidence of focal filling defect concerning for acute pulmonary embolus. Evaluation  of the osseous structures demonstrates no evidence of acute fracture or aggressive osseous lesion, with multilevel thoracic spondylosis change present. Evaluation of the lung fields demonstrate some mild scattered atelectasis, otherwise without evidence of acute infectious or inflammatory process. There are no distinct suspicious pulmonary nodules. Abdomen and pelvis: The lung bases are grossly clear. Evaluation of the body wall soft tissues demonstrates moderate diffuse anasarca. The liver, spleen, pancreas and bilateral adrenal glands demonstrate no acute or suspicious findings. The pancreas is diffusely atrophic. Postoperative changes are present from prior gastric bypass. Small and large bowel loops are nondilated. There is no suspicious focal bowel wall thickening. There is no free fluid or pneumoperitoneum. Atherosclerotic abdominal aorta. Air-fluid levels are present consistent with provided history of diarrheal state. The pelvic viscera demonstrate no acute findings. The kidneys demonstrate no evidence of stones, hydronephrosis or nephropathy.     Impression: Impression: No acute pulmonary embolus or other acute finding in the chest. Moderate diffuse anasarca is present. There are also air-fluid levels present throughout nondistended segments of bowel consistent with diarrheal state. No acute findings in the abdomen or pelvis otherwise. Electronically Signed: Shahid Ash  4/10/2023 10:00 PM EDT  Workstation ID: [id number]    Duplex venous upper extremity right CAR    Result Date: 4/10/2023  •  Sub-acute right upper extremity deep vein thrombosis noted in the subclavian and axillary. •  Sub-acute right upper extremity superficial thrombophlebitis noted in the basilic (upper arm).     CT Angiogram Chest    Result Date: 4/10/2023  CT ABDOMEN PELVIS WO CONTRAST, CT ANGIOGRAM CHEST Date of Exam: 4/10/2023 9:09 PM EDT Indication: diarrhea x 2 weeks, elevated LFT's. The Comparison: None available. Technique: Axial  CT images were obtained of the abdomen and pelvis without the administration of contrast. Reconstructed coronal and sagittal images were also obtained. Automated exposure control and iterative construction methods were used. Findings: CTA chest: There is no pathologic axillary adenopathy or other worrisome body wall soft tissue finding in the chest. No acute findings are present in the partially characterized upper abdomen. There is no pleural or pericardial effusion. There is no pathologic mediastinal adenopathy. Mildly atherosclerotic, nonaneurysmal thoracic aorta. The pulmonary arteries are well-opacified and without evidence of focal filling defect concerning for acute pulmonary embolus. Evaluation of the osseous structures demonstrates no evidence of acute fracture or aggressive osseous lesion, with multilevel thoracic spondylosis change present. Evaluation of the lung fields demonstrate some mild scattered atelectasis, otherwise without evidence of acute infectious or inflammatory process. There are no distinct suspicious pulmonary nodules. Abdomen and pelvis: The lung bases are grossly clear. Evaluation of the body wall soft tissues demonstrates moderate diffuse anasarca. The liver, spleen, pancreas and bilateral adrenal glands demonstrate no acute or suspicious findings. The pancreas is diffusely atrophic. Postoperative changes are present from prior gastric bypass. Small and large bowel loops are nondilated. There is no suspicious focal bowel wall thickening. There is no free fluid or pneumoperitoneum. Atherosclerotic abdominal aorta. Air-fluid levels are present consistent with provided history of diarrheal state. The pelvic viscera demonstrate no acute findings. The kidneys demonstrate no evidence of stones, hydronephrosis or nephropathy.     Impression: Impression: No acute pulmonary embolus or other acute finding in the chest. Moderate diffuse anasarca is present. There are also air-fluid levels present  throughout nondistended segments of bowel consistent with diarrheal state. No acute findings in the abdomen or pelvis otherwise. Electronically Signed: Shahid Ash  4/10/2023 10:00 PM EDT  Workstation ID: [id number]          Current medications:  Scheduled Meds:amitriptyline, 25 mg, Oral, Nightly  atorvastatin, 20 mg, Oral, Daily  aztreonam, 2 g, Intravenous, Q8H  citalopram, 20 mg, Oral, Daily  cloNIDine, 0.1 mg, Oral, Q12H  DAPTOmycin, 400 mg, Intravenous, Q24H  fluticasone, 2 spray, Each Nare, Daily  gabapentin, 400 mg, Oral, Q12H  hydrALAZINE, 25 mg, Oral, Q12H  insulin lispro, 0-7 Units, Subcutaneous, TID AC  methocarbamol, 750 mg, Oral, Q12H  metoprolol succinate XL, 100 mg, Oral, Q24H  oxybutynin XL, 15 mg, Oral, Daily  pantoprazole, 40 mg, Oral, Q AM  sodium chloride, 10 mL, Intravenous, Q12H  temazepam, 30 mg, Oral, Nightly      Continuous Infusions:heparin, 13 Units/kg/hr, Last Rate: 16.5 Units/kg/hr (04/11/23 1056)  Pharmacy to Dose Heparin,       PRN Meds:.•  dextrose  •  dextrose  •  glucagon (human recombinant)  •  HYDROcodone-acetaminophen  •  Pharmacy to Dose Heparin  •  Insert Peripheral IV **AND** sodium chloride  •  sodium chloride  •  sodium chloride    Assessment & Plan   Assessment & Plan     Active Hospital Problems    Diagnosis  POA   • **DVT (deep venous thrombosis) [I82.409]  Yes   • Elevated LFTs [R79.89]  Yes   • Diarrhea [R19.7]  Yes   • Dyspnea [R06.00]  Yes   • GERD (gastroesophageal reflux disease) [K21.9]  Yes   • Type 2 diabetes mellitus with complication, without long-term current use of insulin [E11.8]  Yes   • Essential hypertension [I10]  Yes   • Hyperlipidemia [E78.5]  Yes      Resolved Hospital Problems   No resolved problems to display.        Brief Hospital Course to date:  Gaetano Marcelo is a 63 y.o. male with history of HTN, HLD, GERD, DMII, OA and recent osteomyelitis s/p amputation at [facility] and discharge home with PICC and IV antibiotics, presents with RUE swelling and a  proximal UE DVT.    PICC associated RUE DVT  - duplex 4/10 shows  •  Sub-acute right upper extremity deep vein thrombosis noted in the subclavian and axillary.  •  Sub-acute right upper extremity superficial thrombophlebitis noted in the basilic (upper arm).  - heparin drip currently running  - remove PICC, obtain peripheral IV  - given extent and proximal nature of the UE thrombosis, will transition from heparin to eliquis 10 mg PO BID x 7 days, then 5 mg BID thereafter.  Course of therapy likely 3-6 months.    Elevated LFTs  - , , normal bilirubin.  INR 1.1  - Liver appears normal on CT abdomen pelvis from admission  - Mild LFT elevation at [facility] on 3/30 (AST 30, ALT 65, bili 0.2), with increase on 4/3 (, ALT 98)  - secondary to Invanz? Now stopped  - cmp am    Left foot/third toe infection with osteomyelitis  - s/p surgery 3/29/23  - enterobacter  - discussed treatment plan with Infectious Disease Dr George    Diarrhea  - c diff and GI panel negative    PVD  - apparently placed on low dose xarelto by vascular surgery during recent hospitalization    DMII  - check a1c  - SSI    GERD    Diabetic neuropathy    HTN      Expected Discharge Location and Transportation: home  Expected Discharge        DVT prophylaxis:  Medical DVT prophylaxis orders are present.     AM-PAC 6 Clicks Score (PT): 20 (04/11/23 0617)    CODE STATUS:   Code Status and Medical Interventions:   Ordered at: 04/10/23 2058     Level Of Support Discussed With:    Patient     Code Status (Patient has no pulse and is not breathing):    CPR (Attempt to Resuscitate)     Medical Interventions (Patient has pulse or is breathing):    Full Support       Juma Vo MD  04/11/23

## 2023-04-11 NOTE — CASE MANAGEMENT/SOCIAL WORK
"Discharge Planning Assessment  [facility]     Patient Name: Gaetano Marcelo  MRN: [id number]  Today's Date: 4/11/2023    Admit Date: 4/10/2023    Plan: Home at discharge   Discharge Needs Assessment    No documentation.                Discharge Plan     Row Name 04/11/23 1622       Plan    Plan Home at discharge    Plan Comments Confirmed patient lives with his spouse in [facility]. He is independent of ADLs at baseline. He does have a walker and wheelchair at home if needed. Goal is to return home upon discharge. PT has evaluated, he ambulated 120 ft and per their note: \"anticipate patient to be able to go home with family assist at D/C.\" CM will continue to follow for dc planning- sheela [phone]    Final Discharge Disposition Code 01 - home or self-care              Continued Care and Services - Admitted Since 4/10/2023    Coordination has not been started for this encounter.          Demographic Summary    No documentation.                Functional Status    No documentation.                Psychosocial    No documentation.                Abuse/Neglect    No documentation.                Legal    No documentation.                Substance Abuse    No documentation.                Patient Forms    No documentation.                   Sheela Vásquez RN    "

## 2023-04-12 LAB
ALBUMIN SERPL-MCNC: 2.5 G/DL (ref 3.5–5.2)
ALBUMIN/GLOB SERPL: 1.3 G/DL
ALP SERPL-CCNC: 82 U/L (ref 39–117)
ALT SERPL W P-5'-P-CCNC: 146 U/L (ref 1–41)
ANION GAP SERPL CALCULATED.3IONS-SCNC: 5 MMOL/L (ref 5–15)
AST SERPL-CCNC: 135 U/L (ref 1–40)
BILIRUB SERPL-MCNC: 0.2 MG/DL (ref 0–1.2)
BUN SERPL-MCNC: 9 MG/DL (ref 8–23)
BUN/CREAT SERPL: 11 (ref 7–25)
CALCIUM SPEC-SCNC: 8.2 MG/DL (ref 8.6–10.5)
CHLORIDE SERPL-SCNC: 110 MMOL/L (ref 98–107)
CO2 SERPL-SCNC: 25 MMOL/L (ref 22–29)
CREAT SERPL-MCNC: 0.82 MG/DL (ref 0.76–1.27)
DEPRECATED RDW RBC AUTO: 51.6 FL (ref 37–54)
EGFRCR SERPLBLD CKD-EPI 2021: 97.5 ML/MIN/1.73
ERYTHROCYTE [DISTWIDTH] IN BLOOD BY AUTOMATED COUNT: 15.6 % (ref 12.3–15.4)
GLOBULIN UR ELPH-MCNC: 1.9 GM/DL
GLUCOSE BLDC GLUCOMTR-MCNC: 102 MG/DL (ref 70–130)
GLUCOSE BLDC GLUCOMTR-MCNC: 85 MG/DL (ref 70–130)
GLUCOSE BLDC GLUCOMTR-MCNC: 90 MG/DL (ref 70–130)
GLUCOSE SERPL-MCNC: 87 MG/DL (ref 65–99)
HBA1C MFR BLD: 5 % (ref 4.8–5.6)
HCT VFR BLD AUTO: 34 % (ref 37.5–51)
HGB BLD-MCNC: 10.6 G/DL (ref 13–17.7)
INR PPP: 1.23 (ref 0.84–1.13)
MCH RBC QN AUTO: 27.9 PG (ref 26.6–33)
MCHC RBC AUTO-ENTMCNC: 31.2 G/DL (ref 31.5–35.7)
MCV RBC AUTO: 89.5 FL (ref 79–97)
PLATELET # BLD AUTO: 273 10*3/MM3 (ref 140–450)
PMV BLD AUTO: 9.7 FL (ref 6–12)
POTASSIUM SERPL-SCNC: 4.2 MMOL/L (ref 3.5–5.2)
PROT SERPL-MCNC: 4.4 G/DL (ref 6–8.5)
PROTHROMBIN TIME: 15.4 SECONDS (ref 11.4–14.4)
RBC # BLD AUTO: 3.8 10*6/MM3 (ref 4.14–5.8)
SODIUM SERPL-SCNC: 140 MMOL/L (ref 136–145)
WBC NRBC COR # BLD: 4.82 10*3/MM3 (ref 3.4–10.8)

## 2023-04-12 PROCEDURE — G0378 HOSPITAL OBSERVATION PER HR: HCPCS

## 2023-04-12 PROCEDURE — 25010000002 DAPTOMYCIN PER 1 MG: Performed by: NURSE PRACTITIONER

## 2023-04-12 PROCEDURE — 85027 COMPLETE CBC AUTOMATED: CPT | Performed by: INTERNAL MEDICINE

## 2023-04-12 PROCEDURE — 83036 HEMOGLOBIN GLYCOSYLATED A1C: CPT | Performed by: INTERNAL MEDICINE

## 2023-04-12 PROCEDURE — 97165 OT EVAL LOW COMPLEX 30 MIN: CPT

## 2023-04-12 PROCEDURE — 85610 PROTHROMBIN TIME: CPT | Performed by: INTERNAL MEDICINE

## 2023-04-12 PROCEDURE — 96367 TX/PROPH/DG ADDL SEQ IV INF: CPT

## 2023-04-12 PROCEDURE — 82962 GLUCOSE BLOOD TEST: CPT

## 2023-04-12 PROCEDURE — 96366 THER/PROPH/DIAG IV INF ADDON: CPT

## 2023-04-12 PROCEDURE — 80053 COMPREHEN METABOLIC PANEL: CPT | Performed by: INTERNAL MEDICINE

## 2023-04-12 RX ADMIN — AZTREONAM 2 G: 2 INJECTION, POWDER, LYOPHILIZED, FOR SOLUTION INTRAMUSCULAR; INTRAVENOUS at 09:06

## 2023-04-12 RX ADMIN — APIXABAN 10 MG: 5 TABLET, FILM COATED ORAL at 20:12

## 2023-04-12 RX ADMIN — APIXABAN 10 MG: 5 TABLET, FILM COATED ORAL at 09:05

## 2023-04-12 RX ADMIN — Medication 10 ML: at 09:06

## 2023-04-12 RX ADMIN — Medication 10 ML: at 20:14

## 2023-04-12 RX ADMIN — AMITRIPTYLINE HYDROCHLORIDE 25 MG: 25 TABLET, FILM COATED ORAL at 20:13

## 2023-04-12 RX ADMIN — CLONIDINE HYDROCHLORIDE 0.1 MG: 0.1 TABLET ORAL at 20:13

## 2023-04-12 RX ADMIN — HYDROCODONE BITARTRATE AND ACETAMINOPHEN 1 TABLET: 5; 325 TABLET ORAL at 09:36

## 2023-04-12 RX ADMIN — ASPIRIN 81 MG: 81 TABLET, COATED ORAL at 09:05

## 2023-04-12 RX ADMIN — CLONIDINE HYDROCHLORIDE 0.1 MG: 0.1 TABLET ORAL at 09:05

## 2023-04-12 RX ADMIN — OXYBUTYNIN CHLORIDE 15 MG: 10 TABLET, EXTENDED RELEASE ORAL at 09:05

## 2023-04-12 RX ADMIN — TEMAZEPAM 30 MG: 15 CAPSULE ORAL at 20:12

## 2023-04-12 RX ADMIN — ATORVASTATIN CALCIUM 20 MG: 20 TABLET, FILM COATED ORAL at 09:05

## 2023-04-12 RX ADMIN — DAPTOMYCIN 400 MG: 500 INJECTION, POWDER, LYOPHILIZED, FOR SOLUTION INTRAVENOUS at 22:15

## 2023-04-12 RX ADMIN — GABAPENTIN 400 MG: 400 CAPSULE ORAL at 09:05

## 2023-04-12 RX ADMIN — HYDRALAZINE HYDROCHLORIDE 25 MG: 25 TABLET, FILM COATED ORAL at 09:05

## 2023-04-12 RX ADMIN — METHOCARBAMOL 750 MG: 750 TABLET ORAL at 20:13

## 2023-04-12 RX ADMIN — METHOCARBAMOL 750 MG: 750 TABLET ORAL at 09:05

## 2023-04-12 RX ADMIN — PANTOPRAZOLE SODIUM 40 MG: 40 TABLET, DELAYED RELEASE ORAL at 05:15

## 2023-04-12 RX ADMIN — AZTREONAM 2 G: 2 INJECTION, POWDER, LYOPHILIZED, FOR SOLUTION INTRAMUSCULAR; INTRAVENOUS at 17:32

## 2023-04-12 RX ADMIN — GABAPENTIN 400 MG: 400 CAPSULE ORAL at 20:13

## 2023-04-12 RX ADMIN — HYDRALAZINE HYDROCHLORIDE 25 MG: 25 TABLET, FILM COATED ORAL at 20:13

## 2023-04-12 RX ADMIN — METOPROLOL SUCCINATE 100 MG: 100 TABLET, EXTENDED RELEASE ORAL at 09:05

## 2023-04-12 RX ADMIN — CITALOPRAM HYDROBROMIDE 20 MG: 20 TABLET ORAL at 09:05

## 2023-04-12 RX ADMIN — AZTREONAM 2 G: 2 INJECTION, POWDER, LYOPHILIZED, FOR SOLUTION INTRAMUSCULAR; INTRAVENOUS at 01:27

## 2023-04-12 RX ADMIN — FLUTICASONE PROPIONATE 2 SPRAY: 50 SPRAY, METERED NASAL at 09:04

## 2023-04-12 NOTE — PLAN OF CARE
Goal Outcome Evaluation:  Plan of Care Reviewed With: patient, spouse        Progress: improving  Outcome Evaluation: Pt demonstrates deficits in strength and balance compared to baseline for ADL completion, recom IPOT DC home with assist with HHOT pending progress

## 2023-04-12 NOTE — PLAN OF CARE
Goal Outcome Evaluation:  Plan of Care Reviewed With: patient        Progress: no change  Outcome Evaluation: VSS on room air, no c/o pain/nausea this shift. IV abx given per orders. Pt appears to be sleeping at this time.

## 2023-04-12 NOTE — PROGRESS NOTES
Gaetano Marcelo  1960  [id number]     Chief Complaint:  Diarrhea, right arm swelling/thrombosis    Reason for Consultation: Diarrhea, right arm thrombosis; left foot infection    History of present illness:     Patient is a 63 y.o.  Yr old male pt of Dr Donohue; with history of diabetes and peripheral arterial disease, follows with Tobin Donohue/Simeon/Carlitos; he had left third toe partial amputation proximally March 29 by Dr. Urbina at [facility], wound culture before that had Enterobacter species.  Pathology did have osteomyelitis at the distal phalanx but bone margin was free of inflammation per pathology and per my discussion with Dr. Urbina, he reports bone focus removal.  Patient was transitioned to IV daptomycin/Invanz for ongoing antibiotic therapy by Dr. Donohue.  He was seen in the office April 10 with swollen right arm, sent for stat duplex with evidence for subacute right upper extremity DVT in the subclavian/axillary vein and subacute right upper extremity superficial thrombophlebitis in the basilic vein.  He was admitted to the hospital.    4/11/23 PICC line removal    4/12/23 he denies any new focal pain.  Nursing reports no new distress. Left foot with some dull discomfort, intermittent, nonradiating, worse with pressure, better with pain meds and 2-3 out of 10 in severity, worse near the third toe surgical site.  No new redness or drainage. Diarrhea less intense, one episode today so far    No headache photophobia or neck stiffness.  No shortness of breath cough or hemoptysis.  No nausea or vomiting.  Denies hematochezia melena or hematemesis.  No abdominal pain at present.  No dysuria hematuria or pyuria.  No other new skin rash.       Past Medical History:   Diagnosis Date   • GERD (gastroesophageal reflux disease)    • Hyperlipidemia    • Hypertension    • Insomnia    • Low back pain    • Osteoarthritis    • Pain, phantom limb    • Peripheral edema    • Type 2 diabetes mellitus        Past Surgical  "History:   Procedure Laterality Date   • APPENDECTOMY     • CARPAL TUNNEL RELEASE Left 08/18/2020    Dr. Yosef Diaz   • CHOLECYSTECTOMY     • HERNIA REPAIR     • REPLACEMENT TOTAL KNEE Right    • KANWAL-EN-Y PROCEDURE  2004   • TOE AMPUTATION Right 2013    1st, 2nd Rt toes   • ULNAR NERVE DECOMPRESSION Left 08/18/2020    Dr. Yosef Diaz       Pediatric History   Patient Parents   • Not on file     Other Topics Concern   • Not on file   Social History Narrative   • Not on file       family history includes Coronary artery disease in his father; Diabetes in his brother, father, mother, and paternal grandmother.    Allergies   Allergen Reactions   • Hydrochlorothiazide Rash   • Penicillins Rash        Review of Systems    4/12/23     Constitutional-- No Fever, chills or sweats.  Appetite good, and no malaise. No fatigue.  Heent-- No new vision, hearing or throat complaints.  No epistaxis or oral sores.  Denies odynophagia or dysphagia.  No flashers, floaters or eye pain. No odynophagia or dysphagia. No headache, photophobia or neck stiffness.  CV-- No chest pain, palpitation or syncope  Resp-- No SOB/cough/Hemoptysis  GI-  See above.  No hematochezia, melena, or hematemesis. Denies jaundice or chronic liver disease.  -- No dysuria, hematuria, or flank pain.  Denies hesitancy, urgency or flank pain.  Lymph- no swollen lymph nodes in neck/axilla or groin.   Heme- No active bruising or bleeding   MS-- no swelling or pain in the bones or joints of arms/legs aside from above.  No new back pain.  Neuro-- No acute focal weakness or numbness in the arms or legs.  No seizures.    Full 12 point review of systems reviewed and negative otherwise for acute complaints, except for above    Physical Exam: nursing/chaperone present  Vital Signs   /89   Pulse 89   Temp 98.7 °F (37.1 °C) (Oral)   Resp 18   Ht 182.9 cm (72\")   Wt 113 kg (248 lb 14.4 oz)   SpO2 93%   BMI 33.76 kg/m²     GENERAL: Awake and alert, in no acute " distress.   HEENT: Normocephalic, atraumatic.   No conjunctival injection. No icterus. Oropharynx clear without evidence of thrush or exudate. No evidence of peridontal disease.    NECK: Supple without nuchal rigidity. No mass.  LYMPH: No cervical, axillary or inguinal lymphadenopathy.  HEART: RRR; No murmur, rubs, gallops.   LUNGS: Clear to auscultation bilaterally without wheezing, rales, rhonchi. Normal respiratory effort. Nonlabored. No dullness.  ABDOMEN: Soft, nontender, nondistended. Positive bowel sounds. No rebound or guarding. NO mass or HSM.  EXT:  No cyanosis, clubbing ;No cord.   MSK: FROM without joint effusions noted arms/legs.    SKIN: Warm and dry without cutaneous eruptions on Inspection/palpation.    NEURO: Oriented to PPT.  He has difficult time cooperating with a detailed motor or sensory exam given positioning in bed  PSYCHIATRIC: Normal insight and judgement. Cooperative with PE    Left foot noted, surgical site with no obvious purulent drainage.  Mild erythema at the remaining portion of his third digit but no discrete mass bulge or fluctuance.  No crepitus or bulla. Third digit black on the plantar side    No peripheral stigmata such phenomena of endocarditis      Laboratory Data    Results from last 7 days   Lab Units 04/12/23 0513 04/11/23  0256 04/10/23  2009   WBC 10*3/mm3 4.82 5.48 6.29   HEMOGLOBIN g/dL 10.6* 10.6* 11.8*   HEMATOCRIT % 34.0* 33.8* 37.7   PLATELETS 10*3/mm3 273 277 303     Results from last 7 days   Lab Units 04/12/23 0513   SODIUM mmol/L 140   POTASSIUM mmol/L 4.2   CHLORIDE mmol/L 110*   CO2 mmol/L 25.0   BUN mg/dL 9   CREATININE mg/dL 0.82   GLUCOSE mg/dL 87   CALCIUM mg/dL 8.2*     Results from last 7 days   Lab Units 04/12/23 0513   ALK PHOS U/L 82   BILIRUBIN mg/dL 0.2   ALT (SGPT) U/L 146*   AST (SGOT) U/L 135*               Estimated Creatinine Clearance: 116.6 mL/min (by C-G formula based on SCr of 0.82 mg/dL).      Microbiology:      Radiology:  Imaging  Results (Last 72 Hours)     Procedure Component Value Units Date/Time    CT Angiogram Chest [[id number]] Collected: 04/10/23 2150     Updated: 04/10/23 2203    Narrative:      CT ABDOMEN PELVIS WO CONTRAST, CT ANGIOGRAM CHEST    Date of Exam: 4/10/2023 9:09 PM EDT    Indication: diarrhea x 2 weeks, elevated LFT's.   The  Comparison: None available.    Technique: Axial CT images were obtained of the abdomen and pelvis without the administration of contrast. Reconstructed coronal and sagittal images were also obtained. Automated exposure control and iterative construction methods were used.    Findings:  CTA chest: There is no pathologic axillary adenopathy or other worrisome body wall soft tissue finding in the chest. No acute findings are present in the partially characterized upper abdomen. There is no pleural or pericardial effusion. There is no   pathologic mediastinal adenopathy. Mildly atherosclerotic, nonaneurysmal thoracic aorta. The pulmonary arteries are well-opacified and without evidence of focal filling defect concerning for acute pulmonary embolus. Evaluation of the osseous structures   demonstrates no evidence of acute fracture or aggressive osseous lesion, with multilevel thoracic spondylosis change present. Evaluation of the lung fields demonstrate some mild scattered atelectasis, otherwise without evidence of acute infectious or   inflammatory process. There are no distinct suspicious pulmonary nodules.    Abdomen and pelvis: The lung bases are grossly clear. Evaluation of the body wall soft tissues demonstrates moderate diffuse anasarca. The liver, spleen, pancreas and bilateral adrenal glands demonstrate no acute or suspicious findings. The pancreas is   diffusely atrophic. Postoperative changes are present from prior gastric bypass. Small and large bowel loops are nondilated. There is no suspicious focal bowel wall thickening. There is no free fluid or pneumoperitoneum. Atherosclerotic abdominal  aorta.   Air-fluid levels are present consistent with provided history of diarrheal state. The pelvic viscera demonstrate no acute findings. The kidneys demonstrate no evidence of stones, hydronephrosis or nephropathy.      Impression:      Impression:     No acute pulmonary embolus or other acute finding in the chest.    Moderate diffuse anasarca is present. There are also air-fluid levels present throughout nondistended segments of bowel consistent with diarrheal state. No acute findings in the abdomen or pelvis otherwise.      Electronically Signed: Shahid Ash    4/10/2023 10:00 PM EDT    Workstation ID: [id number]    CT Abdomen Pelvis Without Contrast [[id number]] Collected: 04/10/23 2150     Updated: 04/10/23 2203    Narrative:      CT ABDOMEN PELVIS WO CONTRAST, CT ANGIOGRAM CHEST    Date of Exam: 4/10/2023 9:09 PM EDT    Indication: diarrhea x 2 weeks, elevated LFT's.   The  Comparison: None available.    Technique: Axial CT images were obtained of the abdomen and pelvis without the administration of contrast. Reconstructed coronal and sagittal images were also obtained. Automated exposure control and iterative construction methods were used.    Findings:  CTA chest: There is no pathologic axillary adenopathy or other worrisome body wall soft tissue finding in the chest. No acute findings are present in the partially characterized upper abdomen. There is no pleural or pericardial effusion. There is no   pathologic mediastinal adenopathy. Mildly atherosclerotic, nonaneurysmal thoracic aorta. The pulmonary arteries are well-opacified and without evidence of focal filling defect concerning for acute pulmonary embolus. Evaluation of the osseous structures   demonstrates no evidence of acute fracture or aggressive osseous lesion, with multilevel thoracic spondylosis change present. Evaluation of the lung fields demonstrate some mild scattered atelectasis, otherwise without evidence of acute infectious or    inflammatory process. There are no distinct suspicious pulmonary nodules.    Abdomen and pelvis: The lung bases are grossly clear. Evaluation of the body wall soft tissues demonstrates moderate diffuse anasarca. The liver, spleen, pancreas and bilateral adrenal glands demonstrate no acute or suspicious findings. The pancreas is   diffusely atrophic. Postoperative changes are present from prior gastric bypass. Small and large bowel loops are nondilated. There is no suspicious focal bowel wall thickening. There is no free fluid or pneumoperitoneum. Atherosclerotic abdominal aorta.   Air-fluid levels are present consistent with provided history of diarrheal state. The pelvic viscera demonstrate no acute findings. The kidneys demonstrate no evidence of stones, hydronephrosis or nephropathy.      Impression:      Impression:     No acute pulmonary embolus or other acute finding in the chest.    Moderate diffuse anasarca is present. There are also air-fluid levels present throughout nondistended segments of bowel consistent with diarrheal state. No acute findings in the abdomen or pelvis otherwise.      Electronically Signed: Shahid Ash    4/10/2023 10:00 PM EDT    Workstation ID: [id number]            Impression:     --left foot/third toe infection with osteomyelitis at the left third distal phalanx, surgery approximately March 29 and bone focus removal per discussion with Dr. Urbina with bone margin free of inflammation per pathology.  Preoperative culture with Enterobacter species, high risk for mixed infection in the setting of prior gangrene.  Empiric antibiotics ongoing for remaining soft tissue infection, adjusted to daptomycin/Azactam.  Possible antibiotic intolerances as outlined below.  Further antibiotic decisions to depend on clinical course/study results and response to therapy.  High risk for further serious morbidity and other serious sequela including persistent/recurrent or nonhealing wounds, persistence  "of progressive or recurrent infection and risk for further functional/limb loss, higher-level amputation etc.    --Diarrhea.  Acute/subacute.  C. Difficile negative.  GI PCR negative.  Abdomen otherwise benign and nontender at present. CT scan reports anasarca but no acute findings in the abdomen aside from changes suggested of \"diarrheal state\".    --abnormal LFTs.  Unclear if related to antimicrobials versus systemic process.  Invanz empirically stopped and monitor    --Acute right upper extremity DVT/SVT.  On anticoagulation per medicine team and PICC line to be removed.    --diabetes.  Glucose control per medicine team    --Peripheral vascular disease.  Extent of disease unclear and follows with Dr. Hendrix    PLAN:      --IV daptomycin/Azactam    --Invanz stopped    --Check/review labs cultures and scans    --Discussed with microbiology    --Discussed with pharmacy    --Discussed with Dr. Urbina and with Dr. Vo    --d/w Dr Donohue    --Highly complex at of issues with high risk for further serious morbidity and other serious sequela    **Copied text in this note has been reviewed and is accurate as of 04/12/23.        Dawood George MD  4/12/2023              "

## 2023-04-12 NOTE — CASE MANAGEMENT/SOCIAL WORK
Continued Stay Note   [facility]     Patient Name: Gaetano Marcelo  MRN: [id number]  Today's Date: 4/12/2023    Admit Date: 4/10/2023    Plan: Home with home health   Discharge Plan     Row Name 04/12/23 1535       Plan    Plan Home with home health    Plan Comments Revisited patient and spouse at bedside - spouse notified CM that patient is current with [facility] home [facility] out of [place] for skilled nursing/dressing changes. Spouse has been doing his dressing changes when home health is unable to. Goal remains for patient to return home upon discharge with [facility] home [facility] - CM will notify [facility] upon discharge and fax any needed documentation/orders. Will continue to follow- sheela [phone]    Final Discharge Disposition Code 06 - home with home health care               Discharge Codes    No documentation.               Expected Discharge Date and Time     Expected Discharge Date Expected Discharge Time    Apr 13, 2023             Sheela Vásquez RN

## 2023-04-12 NOTE — THERAPY EVALUATION
Patient Name: Gaetano Marcelo  : 1960    MRN: [id number]                              Today's Date: 2023       Admit Date: 4/10/2023    Visit Dx:     ICD-10-CM ICD-9-CM   1. Acute deep vein thrombosis (DVT) of axillary vein of right upper extremity  I82.A11 453.84   2. History of osteomyelitis  Z87.39 V13.59     Patient Active Problem List   Diagnosis   • Type 2 diabetes mellitus with complication, without long-term current use of insulin   • Chronic pain disorder   • Depressive disorder   • Morbid obesity   • Gastroesophageal reflux disease without esophagitis   • Hyperlipidemia   • Essential hypertension   • Insomnia   • Low back pain   • GERD (gastroesophageal reflux disease)   • Hypertension   • Osteoarthritis   • Pain, phantom limb   • Peripheral edema   • DVT (deep venous thrombosis)   • Elevated LFTs   • Diarrhea   • Dyspnea     Past Medical History:   Diagnosis Date   • GERD (gastroesophageal reflux disease)    • Hyperlipidemia    • Hypertension    • Insomnia    • Low back pain    • Osteoarthritis    • Pain, phantom limb    • Peripheral edema    • Type 2 diabetes mellitus      Past Surgical History:   Procedure Laterality Date   • APPENDECTOMY     • CARPAL TUNNEL RELEASE Left 2020    Dr. Yosef Diaz   • CHOLECYSTECTOMY     • HERNIA REPAIR     • REPLACEMENT TOTAL KNEE Right    • KANWAL-EN-Y PROCEDURE     • TOE AMPUTATION Right     1st, 2nd Rt toes   • ULNAR NERVE DECOMPRESSION Left 2020    Dr. Yosef Diaz      General Information     Row Name 23 0834          OT Time and Intention    Document Type evaluation  -KF     Mode of Treatment occupational therapy  -KF     Row Name 23 0834          General Information    Patient Profile Reviewed yes  -KF     Prior Level of Function independent:;transfer;bed mobility;min assist:;ADL's  -KF     Existing Precautions/Restrictions fall;partial weight bearing;left  LLE heel WB with offloading shoe  -KF     Barriers to Rehab  medically complex  -     Row Name 04/12/23 0834          Occupational Profile    Environmental Supports and Barriers (Occupational Profile) has FWW and WC as needed as well as [place] shower with seat  -     Row Name 04/12/23 0834          Living Environment    People in Home spouse  -     Row Name 04/12/23 0834          Home Main Entrance    Number of Stairs, Main Entrance one  -KF     Row Name 04/12/23 0834          Stairs Within Home, Primary    Number of Stairs, Within Home, Primary none  -KF     Row Name 04/12/23 0834          Cognition    Orientation Status (Cognition) oriented x 4  -KF     Row Name 04/12/23 0834          Safety Issues, Functional Mobility    Safety Issues Affecting Function (Mobility) insight into deficits/self-awareness;awareness of need for assistance  -KF     Impairments Affecting Function (Mobility) balance;endurance/activity tolerance;strength  -KF     Comment, Safety Issues/Impairments (Mobility) min VC's for heel WB initially with good completion  -KF           User Key  (r) = Recorded By, (t) = Taken By, (c) = Cosigned By    Initials Name Provider Type    KF Sofie Scales OT Occupational Therapist                 Mobility/ADL's     Row Name 04/12/23 0835          Bed Mobility    Bed Mobility scooting/bridging;supine-sit  -KF     Scooting/Bridging [place] (Bed Mobility) modified independence  -KF     Supine-Sit [place] (Bed Mobility) modified independence  -KF     Bed Mobility, Safety Issues decreased use of legs for bridging/pushing  -KF     Assistive Device (Bed Mobility) bed rails;head of bed elevated  -KF     Comment, (Bed Mobility) good completion of all bed mob without safety concerns demonstrated  -     Row Name 04/12/23 0835          Transfers    Transfers sit-stand transfer;stand-sit transfer;bed-chair transfer  -KF     Comment, (Transfers) cues minimally for weightbearing status on LLE and safe sequencing  -     Row Name 04/12/23 0835          Bed-Chair  Transfer    Bed-Chair [place] (Transfers) standby assist  -     Assistive Device (Bed-Chair Transfers) walker, front-wheeled  -KF     Row Name 04/12/23 0835          Sit-Stand Transfer    Sit-Stand [place] (Transfers) standby assist  -KF     Assistive Device (Sit-Stand Transfers) walker, front-wheeled  -KF     Row Name 04/12/23 0835          Stand-Sit Transfer    Stand-Sit [place] (Transfers) standby assist  -KF     Assistive Device (Stand-Sit Transfers) walker, front-wheeled  -KF     Row Name 04/12/23 08          Functional Mobility    Functional Mobility- Ind. Level contact guard assist  -     Functional Mobility- Device walker, front-wheeled;brace/special shoe  -     Functional Mobility-Distance (Feet) 6  -KF     Functional Mobility- Safety Issues weight-shifting ability decreased;step length decreased  -     Row Name 04/12/23 08          Activities of Daily Living    BADL Assessment/Intervention lower body dressing;grooming;toileting;upper body dressing;feeding  -     Row Name 04/12/23 [facility]          Lower Body Dressing Assessment/Training    [place] Level (Lower Body Dressing) don;shoes/slippers;maximum assist (25% patient effort)  -KF     Position (Lower Body Dressing) sitting up in bed  -KF     Comment, (Lower Body Dressing) reports assist at home with LBD  -     Row Name 04/12/23 08          Grooming Assessment/Training    [place] Level (Grooming) wash face, hands;set up  -KF     Position (Grooming) unsupported sitting  -     Row Name 04/12/23 08          Toileting Assessment/Training    [place] Level (Toileting) perform perineal hygiene;maximum assist (25% patient effort)  -KF     Position (Toileting) supported standing  -     Row Name 04/12/23 08          Upper Body Dressing Assessment/Training    [place] Level (Upper Body Dressing) don;front opening garment;minimum assist (75% patient effort)  -KF     Position (Upper Body Dressing) edge of bed  sitting  -KF     Row Name 04/12/23 0835          Self-Feeding Assessment/Training    [place] Level (Feeding) feeding skills;prepare tray/open items;scoop food and bring to mouth;set up  -KF     Position (Self-Feeding) supported sitting  -KF           User Key  (r) = Recorded By, (t) = Taken By, (c) = Cosigned By    Initials Name Provider Type    KF Sofie Scales, OT Occupational Therapist               Obj/Interventions     Row Name 04/12/23 0837          Sensory Assessment (Somatosensory)    Sensory Assessment (Somatosensory) bilateral UE  -KF     Bilateral UE Sensory Assessment general sensation  -KF     Sensory Subjective Reports numbness  -KF     Sensory Assessment finger tips chronic numbness  -KF     Row Name 04/12/23 0837          Vision Assessment/Intervention    Visual Impairment/Limitations WFL;corrective lenses for reading  -KF     Row Name 04/12/23 0837          Range of Motion Comprehensive    General Range of Motion bilateral upper extremity ROM WFL  -KF     Row Name 04/12/23 08          Strength Comprehensive (MMT)    General Manual Muscle Testing (MMT) Assessment upper extremity strength deficits identified  -KF     Comment, General Manual Muscle Testing (MMT) Assessment BUE grossly 5/5  -KF     Row Name 04/12/23 08          Motor Skills    Motor Skills coordination  -KF     Coordination WFL;upper extremity;bilateral;bimanual skills  -KF     Row Name 04/12/23 0837          Balance    Balance Assessment sitting static balance;sitting dynamic balance;standing static balance;standing dynamic balance  -KF     Static Sitting Balance independent  -KF     Dynamic Sitting Balance independent  -KF     Position, Sitting Balance unsupported;sitting edge of bed  -KF     Static Standing Balance standby assist  -KF     Dynamic Standing Balance contact guard  -KF     Position/Device Used, Standing Balance supported;walker, front-wheeled  -KF     Balance Interventions standing;sit to  stand;supported;minimal challenge;weight shifting activity;occupation based/functional task  -KF     Comment, Balance increased unsteadiness and weakness in standing compared to baseline resulting in increased assist for ADL completion in standing  -KF           User Key  (r) = Recorded By, (t) = Taken By, (c) = Cosigned By    Initials Name Provider Type    KF Sofie Scales, OT Occupational Therapist               Goals/Plan     Row Name 04/12/23 0841          Transfer Goal 1 (OT)    Activity/Assistive Device (Transfer Goal 1, OT) toilet;walker, rolling  -KF     [place] Level/Cues Needed (Transfer Goal 1, OT) standby assist  -KF     Time Frame (Transfer Goal 1, OT) long term goal (LTG);10 days  -KF     Progress/Outcome (Transfer Goal 1, OT) new goal;goal ongoing  -KF     Row Name 04/12/23 0841          Toileting Goal 1 (OT)    Activity/Device (Toileting Goal 1, OT) adjust/manage clothing;perform perineal hygiene;grab bar/safety frame;commode  -KF     [place] Level/Cues Needed (Toileting Goal 1, OT) contact guard required  -KF     Time Frame (Toileting Goal 1, OT) long term goal (LTG);10 days  -KF     Progress/Outcome (Toileting Goal 1, OT) new goal;goal ongoing  -KF     Row Name 04/12/23 0841          Grooming Goal 1 (OT)    Activity/Device (Grooming Goal 1, OT) oral care;wash face, hands  tolerated with good balance in standing with maintained WB status as appropriate  -KF     [place] (Grooming Goal 1, OT) supervision required  -KF     Time Frame (Grooming Goal 1, OT) long term goal (LTG);10 days  -KF     Progress/Outcome (Grooming Goal 1, OT) goal ongoing;new goal  -KF     Row Name 04/12/23 0841          Therapy Assessment/Plan (OT)    Planned Therapy Interventions (OT) activity tolerance training;adaptive equipment training;BADL retraining;strengthening exercise;transfer/mobility retraining;functional balance retraining;occupation/activity based interventions;ROM/therapeutic  exercise;patient/caregiver education/training  -KF           User Key  (r) = Recorded By, (t) = Taken By, (c) = Cosigned By    Initials Name Provider Type    KF Sofie Scales, OT Occupational Therapist               Clinical Impression     Row Name 04/12/23 0839          Pain Assessment    Pretreatment Pain Rating 8/10  -KF     Posttreatment Pain Rating 8/10  -KF     Pain Location generalized  -KF     Pain Location - back  -KF     Pre/Posttreatment Pain Comment tolerated  -KF     Pain Intervention(s) Repositioned;Ambulation/increased activity  -KF     Row Name 04/12/23 0839          Plan of Care Review    Plan of Care Reviewed With patient;spouse  -KF     Progress improving  -     Outcome Evaluation Pt demonstrates deficits in strength and balance compared to baseline for ADL completion, recom IPOT DC home with assist with HHOT pending progress  -     Row Name 04/12/23 0839          Therapy Assessment/Plan (OT)    Rehab Potential (OT) good, to achieve stated therapy goals  -KF     Criteria for Skilled Therapeutic Interventions Met (OT) yes;meets criteria;skilled treatment is necessary  -KF     Therapy Frequency (OT) daily  -KF     Row Name 04/12/23 0839          Therapy Plan Review/Discharge Plan (OT)    Equipment Needs Upon Discharge (OT) dressing equipment  TBD  -KF     Anticipated Discharge Disposition (OT) home with assist;home with home health  -     Row Name 04/12/23 0839          Vital Signs    Pre Systolic BP Rehab --  RN cleared VSS  -KF     Pre Patient Position Supine  -KF     Intra Patient Position Standing  -KF     Post Patient Position Sitting  -KF     Rest Breaks  1  -KF     Row Name 04/12/23 0839          Positioning and Restraints    Pre-Treatment Position in bed  -KF     Post Treatment Position chair  -KF     In Chair notified nsg;reclined;sitting;call light within reach;encouraged to call for assist;with family/caregiver;legs elevated  RN waved exit  -KF           User Key  (r) =  Recorded By, (t) = Taken By, (c) = Cosigned By    Initials Name Provider Type    Sofie Yanez OT Occupational Therapist               Outcome Measures     Row Name 04/12/23 0842          How much help from another is currently needed...    Putting on and taking off regular lower body clothing? 2  -KF     Bathing (including washing, rinsing, and drying) 3  -KF     Toileting (which includes using toilet bed pan or urinal) 2  -KF     Putting on and taking off regular upper body clothing 3  -KF     Taking care of personal grooming (such as brushing teeth) 3  -KF     Eating meals 4  -KF     AM-PAC 6 Clicks Score (OT) 17  -KF     Row Name 04/12/23 0842          Functional Assessment    Outcome Measure Options AM-PAC 6 Clicks Daily Activity (OT)  -KF           User Key  (r) = Recorded By, (t) = Taken By, (c) = Cosigned By    Initials Name Provider Type    Sofie Yanez OT Occupational Therapist                Occupational Therapy Education     Title: PT OT SLP Therapies (In Progress)     Topic: Occupational Therapy (Done)     Point: ADL training (Done)     Description:   Instruct learner(s) on proper safety adaptation and remediation techniques during self care or transfers.   Instruct in proper use of assistive devices.              Learning Progress Summary           Patient Acceptance, E,TB,D, VU,DU,NR by  at 4/12/2023 0842                   Point: Home exercise program (Done)     Description:   Instruct learner(s) on appropriate technique for monitoring, assisting and/or progressing therapeutic exercises/activities.              Learning Progress Summary           Patient Acceptance, E,TB,D, VU,DU,NR by  at 4/12/2023 0842                   Point: Precautions (Done)     Description:   Instruct learner(s) on prescribed precautions during self-care and functional transfers.              Learning Progress Summary           Patient Acceptance, E,TB,D, VU,DU,NR by  at 4/12/2023 0842                    Point: Body mechanics (Done)     Description:   Instruct learner(s) on proper positioning and spine alignment during self-care, functional mobility activities and/or exercises.              Learning Progress Summary           Patient Acceptance, E,TB,D, VU,DU,NR by  at 4/12/2023 0842                               User Key     Initials Effective Dates Name Provider Type Discipline     06/16/21 -  Sofie Scales OT Occupational Therapist OT              OT Recommendation and Plan  Planned Therapy Interventions (OT): activity tolerance training, adaptive equipment training, BADL retraining, strengthening exercise, transfer/mobility retraining, functional balance retraining, occupation/activity based interventions, ROM/therapeutic exercise, patient/caregiver education/training  Therapy Frequency (OT): daily  Plan of Care Review  Plan of Care Reviewed With: patient, spouse  Progress: improving  Outcome Evaluation: Pt demonstrates deficits in strength and balance compared to baseline for ADL completion, recom IPOT DC home with assist with HHOT pending progress     Time Calculation:    Time Calculation- OT     Row Name 04/12/23 0806             Time Calculation- OT    OT Start Time 0806  -KF      OT Received On 04/12/23  -      OT Goal Re-Cert Due Date 04/22/23  -         Untimed Charges    OT Eval/Re-eval Minutes 47  -KF         Total Minutes    Untimed Charges Total Minutes 47  -KF       Total Minutes 47  -KF            User Key  (r) = Recorded By, (t) = Taken By, (c) = Cosigned By    Initials Name Provider Type     Sofie Scales OT Occupational Therapist              Therapy Charges for Today     Code Description Service Date Service Provider Modifiers Qty    [id number] HC OT EVAL LOW COMPLEXITY 4 4/12/2023 Sofie Scales OT GO 1               Sofie Scales OT  4/12/2023

## 2023-04-12 NOTE — PROGRESS NOTES
[facility]  PROGRESS NOTE    Patient Name: Gaetano Marcelo  : 1960  MRN: [id number]    Date of Admission: 4/10/2023  Primary Care Physician: Juan Mcclain MD    Subjective     CC: f/u RUE DVT    HPI:  In bed. Feeling okay. Asks when RUE swelling will recede. We discussed. Reports he was scheduled to see his vascular surgeon today. Diarrhea is slowing down     ROS:  Gen- No fevers, chills  CV- No chest pain, palpitations  Resp- No cough, dyspnea  GI- No N/V/D, abd pain    Objective     Vital Signs:   Temp:  [97.6 °F (36.4 °C)-98.7 °F (37.1 °C)] 98.3 °F (36.8 °C)  Heart Rate:  [79-95] 79  Resp:  [16-18] 18  BP: (100-142)/(56-89) 119/61     Physical Exam:  Constitutional: No acute distress, awake, alert and conversational. Sitting in bed   HENT: NCAT, mucous membranes moist  Respiratory: Clear to auscultation bilaterally, respiratory effort normal   Cardiovascular: RRR, no murmurs, rubs, or gallops  Gastrointestinal: Positive bowel sounds, soft, nontender, nondistended  Musculoskeletal: No bilateral ankle edema. RUE edema. L foot dressed - I did not remove for exam  Psychiatric: Appropriate affect, cooperative  Neurologic: Oriented x 3, moves all extremities spontaneously without focal deficits, speech clear  Skin: No rashes    Results Reviewed:  LAB RESULTS:      Lab 23  0513 23  0949 23  0256 04/10/23  2009   WBC 4.82  --  5.48 6.29   HEMOGLOBIN 10.6*  --  10.6* 11.8*   HEMATOCRIT 34.0*  --  33.8* 37.7   PLATELETS 273  --  277 303   NEUTROS ABS  --   --  3.15 3.82   IMMATURE GRANS (ABS)  --   --  0.02 0.03   LYMPHS ABS  --   --  1.52 1.62   MONOS ABS  --   --  0.56 0.61   EOS ABS  --   --  0.20 0.16   MCV 89.5  --  89.4 89.3   PROTIME 15.4*  --   --  14.1   APTT  --  106.6* 50.5* 34.9*   HEPARIN ANTI-XA  --   --   --  0.39         Lab 23  0513 23  0256 04/10/23  2009   SODIUM 140 142 143   POTASSIUM 4.2 3.8 4.1   CHLORIDE 110*  110* 109*   CO2 25.0 25.0 24.0   ANION GAP 5.0 7.0 10.0   BUN 9 8 8   CREATININE 0.82 0.94 0.78   EGFR 97.5 90.0 99.0   GLUCOSE 87 98 98   CALCIUM 8.2* 8.0* 8.6   HEMOGLOBIN A1C 5.00  --   --          Lab 04/12/23  0513 04/11/23  0256 04/10/23  2009   TOTAL PROTEIN 4.4* 5.0* 5.6*   ALBUMIN 2.5* 2.6* 3.1*   GLOBULIN 1.9 2.4 2.5   ALT (SGPT) 146* 180* 199*   AST (SGOT) 135* 222* 220*   BILIRUBIN 0.2 0.2 0.2   ALK PHOS 82 86 96         Lab 04/12/23  0513 04/10/23  2009   PROTIME 15.4* 14.1   INR 1.23* 1.10     Brief Urine Lab Results     None        Microbiology Results Abnormal     Procedure Component Value - Date/Time    Gastrointestinal Panel, PCR - Stool, Per Rectum [[id number]]  (Normal) Collected: 04/10/23 2100    Lab Status: Final result Specimen: Stool from Per Rectum Updated: 04/11/23 0948     Campylobacter Not Detected     Plesiomonas shigelloides Not Detected     Salmonella Not Detected     Vibrio Not Detected     Vibrio cholerae Not Detected     Yersinia enterocolitica Not Detected     Enteroaggregative E. coli (EAEC) Not Detected     Enteropathogenic E. coli (EPEC) Not Detected     Enterotoxigenic E. coli (ETEC) lt/st Not Detected     Shiga-like toxin-producing E. coli (STEC) stx1/stx2 Not Detected     Shigella/Enteroinvasive E. coli (EIEC) Not Detected     Cryptosporidium Not Detected     Cyclospora cayetanensis Not Detected     Entamoeba histolytica Not Detected     Giardia lamblia Not Detected     Adenovirus F40/41 Not Detected     Astrovirus Not Detected     Norovirus GI/GII Not Detected     Rotavirus A Not Detected     Sapovirus (I, II, IV or V) Not Detected        CT Abdomen Pelvis Without Contrast    Result Date: 4/10/2023  CT ABDOMEN PELVIS WO CONTRAST, CT ANGIOGRAM CHEST Date of Exam: 4/10/2023 9:09 PM EDT Indication: diarrhea x 2 weeks, elevated LFT's. The Comparison: None available. Technique: Axial CT images were obtained of the abdomen and pelvis without the administration of contrast.  Reconstructed coronal and sagittal images were also obtained. Automated exposure control and iterative construction methods were used. Findings: CTA chest: There is no pathologic axillary adenopathy or other worrisome body wall soft tissue finding in the chest. No acute findings are present in the partially characterized upper abdomen. There is no pleural or pericardial effusion. There is no pathologic mediastinal adenopathy. Mildly atherosclerotic, nonaneurysmal thoracic aorta. The pulmonary arteries are well-opacified and without evidence of focal filling defect concerning for acute pulmonary embolus. Evaluation of the osseous structures demonstrates no evidence of acute fracture or aggressive osseous lesion, with multilevel thoracic spondylosis change present. Evaluation of the lung fields demonstrate some mild scattered atelectasis, otherwise without evidence of acute infectious or inflammatory process. There are no distinct suspicious pulmonary nodules. Abdomen and pelvis: The lung bases are grossly clear. Evaluation of the body wall soft tissues demonstrates moderate diffuse anasarca. The liver, spleen, pancreas and bilateral adrenal glands demonstrate no acute or suspicious findings. The pancreas is diffusely atrophic. Postoperative changes are present from prior gastric bypass. Small and large bowel loops are nondilated. There is no suspicious focal bowel wall thickening. There is no free fluid or pneumoperitoneum. Atherosclerotic abdominal aorta. Air-fluid levels are present consistent with provided history of diarrheal state. The pelvic viscera demonstrate no acute findings. The kidneys demonstrate no evidence of stones, hydronephrosis or nephropathy.     Impression: Impression: No acute pulmonary embolus or other acute finding in the chest. Moderate diffuse anasarca is present. There are also air-fluid levels present throughout nondistended segments of bowel consistent with diarrheal state. No acute  findings in the abdomen or pelvis otherwise. Electronically Signed: Shahid Ash  4/10/2023 10:00 PM EDT  Workstation ID: [id number]    Duplex venous upper extremity right CAR    Result Date: 4/10/2023  •  Sub-acute right upper extremity deep vein thrombosis noted in the subclavian and axillary. •  Sub-acute right upper extremity superficial thrombophlebitis noted in the basilic (upper arm).     CT Angiogram Chest    Result Date: 4/10/2023  CT ABDOMEN PELVIS WO CONTRAST, CT ANGIOGRAM CHEST Date of Exam: 4/10/2023 9:09 PM EDT Indication: diarrhea x 2 weeks, elevated LFT's. The Comparison: None available. Technique: Axial CT images were obtained of the abdomen and pelvis without the administration of contrast. Reconstructed coronal and sagittal images were also obtained. Automated exposure control and iterative construction methods were used. Findings: CTA chest: There is no pathologic axillary adenopathy or other worrisome body wall soft tissue finding in the chest. No acute findings are present in the partially characterized upper abdomen. There is no pleural or pericardial effusion. There is no pathologic mediastinal adenopathy. Mildly atherosclerotic, nonaneurysmal thoracic aorta. The pulmonary arteries are well-opacified and without evidence of focal filling defect concerning for acute pulmonary embolus. Evaluation of the osseous structures demonstrates no evidence of acute fracture or aggressive osseous lesion, with multilevel thoracic spondylosis change present. Evaluation of the lung fields demonstrate some mild scattered atelectasis, otherwise without evidence of acute infectious or inflammatory process. There are no distinct suspicious pulmonary nodules. Abdomen and pelvis: The lung bases are grossly clear. Evaluation of the body wall soft tissues demonstrates moderate diffuse anasarca. The liver, spleen, pancreas and bilateral adrenal glands demonstrate no acute or suspicious findings. The pancreas is  diffusely atrophic. Postoperative changes are present from prior gastric bypass. Small and large bowel loops are nondilated. There is no suspicious focal bowel wall thickening. There is no free fluid or pneumoperitoneum. Atherosclerotic abdominal aorta. Air-fluid levels are present consistent with provided history of diarrheal state. The pelvic viscera demonstrate no acute findings. The kidneys demonstrate no evidence of stones, hydronephrosis or nephropathy.     Impression: Impression: No acute pulmonary embolus or other acute finding in the chest. Moderate diffuse anasarca is present. There are also air-fluid levels present throughout nondistended segments of bowel consistent with diarrheal state. No acute findings in the abdomen or pelvis otherwise. Electronically Signed: Shahid Ash  4/10/2023 10:00 PM EDT  Workstation ID: [id number]    Current medications:  Scheduled Meds:amitriptyline, 25 mg, Oral, Nightly  apixaban, 10 mg, Oral, Q12H   Followed by  [START ON 4/18/2023] apixaban, 5 mg, Oral, Q12H  aspirin, 81 mg, Oral, Daily  atorvastatin, 20 mg, Oral, Daily  aztreonam, 2 g, Intravenous, Q8H  citalopram, 20 mg, Oral, Daily  cloNIDine, 0.1 mg, Oral, Q12H  DAPTOmycin, 400 mg, Intravenous, Q24H  fluticasone, 2 spray, Each Nare, Daily  gabapentin, 400 mg, Oral, Q12H  hydrALAZINE, 25 mg, Oral, Q12H  insulin lispro, 0-7 Units, Subcutaneous, TID AC  methocarbamol, 750 mg, Oral, Q12H  metoprolol succinate XL, 100 mg, Oral, Q24H  oxybutynin XL, 15 mg, Oral, Daily  pantoprazole, 40 mg, Oral, Q AM  sodium chloride, 10 mL, Intravenous, Q12H  temazepam, 30 mg, Oral, Nightly      Continuous Infusions:   PRN Meds:.•  dextrose  •  dextrose  •  glucagon (human recombinant)  •  HYDROcodone-acetaminophen  •  sodium chloride  •  sodium chloride    Assessment & Plan     Active Hospital Problems    Diagnosis  POA   • **DVT (deep venous thrombosis) [I82.409]  Yes   • Elevated LFTs [R79.89]  Yes   • Diarrhea [R19.7]  Yes   • Dyspnea  [R06.00]  Yes   • GERD (gastroesophageal reflux disease) [K21.9]  Yes   • Type 2 diabetes mellitus with complication, without long-term current use of insulin [E11.8]  Yes   • Essential hypertension [I10]  Yes   • Hyperlipidemia [E78.5]  Yes      Resolved Hospital Problems   No resolved problems to display.     Brief Hospital Course to date:  Gaetano Marcelo is a 63 y.o. male with PMH significant for HTN, HLD, DMII, GERD and PAD. He is s/p L third toe partial amputation on 3/29/23 by Dr. Urbina. Pre-op wound culture (+) Enterobacter species. Pathology did show osteomyelitis at the distal phalanx but bone margin was free of inflammation per pathology - felt to be removable focus of infection. Dr. Donohue of [facility] followed. A PICC was placed and he was discharged on IV Daptomycin/Invanz. He followed up with Dr. Donohue in the office on 4/10/23 with complaints of RUE edema. STAT RUE duplex showed a subacute RUE DVT in the subclavian/axillary vein and subacute RUE superficial thrombophlebitis in the basilic vein. Admitted to [facility] for further treatment/evaluation.     PICC associated RUE DVT  - RUE duplex showed subacute right upper extremity deep vein thrombosis noted in the subclavian and axillary veins as well as subacute right upper extremity superficial thrombophlebitis noted in the basilic (upper arm)  - s/p heparin gtt - now on Eliquis 10mg BID x 7 days, then 5mg BID thereafter. Course of therapy likely 3-6 months.  - PICC removed    Left foot/third toe infection with osteomyelitis  - s/p 3rd toe amputation 3/29/23 by Dr. Urbina at [facility] - cultures (+) Enterobacter    - Antibiotics at the discretion of ID      Elevated LFTs  - On admission, , , normal bilirubin.  INR 1.1  - Liver appeared normal on CT abdomen/pelvis from admission  - Mild LFT elevation at [facility] on 3/30 (AST 30, ALT 65, bili 0.2), with increase on 4/3 (, ALT 98)  - Secondary to Invanz? - has since been discontinued  -  Trending down      Diarrhea, improving  - GI PCR and C. Diff negative     PVD  - Followed by Dr. Robbie Vazquez of vascular surgery. Scheduled to have follow up today - will need to reschedule prior to DC   - Was apparently placed on low dose xarelto by vascular surgery during recent hospitalization  - On hold now and on Eliquis      DMII  Diabetic neuropathy  - A1c 5.0% - continue SSI  - Follows with Dr. Talbot of endocrinology   - Could likely stop Metformin at DC and continue Ozempic monotherapy if patient agreeable      HTN  - Continue Metoprolol and Clonidine     Expected Discharge Location and Transportation: home   Expected Discharge  Expected Discharge Date and Time     Expected Discharge Date Expected Discharge Time    Apr 13, 2023          DVT prophylaxis:Medical DVT prophylaxis orders are present.     AM-PAC 6 Clicks Score (PT): 20 (04/12/23 0800)    CODE STATUS:   Code Status and Medical Interventions:   Ordered at: 04/10/23 2058     Level Of Support Discussed With:    Patient     Code Status (Patient has no pulse and is not breathing):    CPR (Attempt to Resuscitate)     Medical Interventions (Patient has pulse or is breathing):    Full Support     Leela Murray PA-C  04/12/23

## 2023-04-13 VITALS
DIASTOLIC BLOOD PRESSURE: 63 MMHG | RESPIRATION RATE: 18 BRPM | OXYGEN SATURATION: 96 % | WEIGHT: 248.9 LBS | HEART RATE: 78 BPM | TEMPERATURE: 98.7 F | BODY MASS INDEX: 33.71 KG/M2 | HEIGHT: 72 IN | SYSTOLIC BLOOD PRESSURE: 118 MMHG

## 2023-04-13 LAB
ALBUMIN SERPL-MCNC: 2.6 G/DL (ref 3.5–5.2)
ALBUMIN/GLOB SERPL: 1.4 G/DL
ALP SERPL-CCNC: 85 U/L (ref 39–117)
ALT SERPL W P-5'-P-CCNC: 193 U/L (ref 1–41)
ANION GAP SERPL CALCULATED.3IONS-SCNC: 7 MMOL/L (ref 5–15)
AST SERPL-CCNC: 258 U/L (ref 1–40)
BILIRUB SERPL-MCNC: 0.2 MG/DL (ref 0–1.2)
BUN SERPL-MCNC: 9 MG/DL (ref 8–23)
BUN/CREAT SERPL: 10.8 (ref 7–25)
CALCIUM SPEC-SCNC: 8.3 MG/DL (ref 8.6–10.5)
CHLORIDE SERPL-SCNC: 111 MMOL/L (ref 98–107)
CO2 SERPL-SCNC: 24 MMOL/L (ref 22–29)
CREAT SERPL-MCNC: 0.83 MG/DL (ref 0.76–1.27)
EGFRCR SERPLBLD CKD-EPI 2021: 97.1 ML/MIN/1.73
GLOBULIN UR ELPH-MCNC: 1.8 GM/DL
GLUCOSE BLDC GLUCOMTR-MCNC: 74 MG/DL (ref 70–130)
GLUCOSE SERPL-MCNC: 79 MG/DL (ref 65–99)
POTASSIUM SERPL-SCNC: 4.5 MMOL/L (ref 3.5–5.2)
PROT SERPL-MCNC: 4.4 G/DL (ref 6–8.5)
SODIUM SERPL-SCNC: 142 MMOL/L (ref 136–145)

## 2023-04-13 PROCEDURE — G0378 HOSPITAL OBSERVATION PER HR: HCPCS

## 2023-04-13 PROCEDURE — 80053 COMPREHEN METABOLIC PANEL: CPT | Performed by: INTERNAL MEDICINE

## 2023-04-13 PROCEDURE — 82962 GLUCOSE BLOOD TEST: CPT

## 2023-04-13 PROCEDURE — 96366 THER/PROPH/DIAG IV INF ADDON: CPT

## 2023-04-13 RX ORDER — DOXYCYCLINE HYCLATE 100 MG/1
100 CAPSULE ORAL 2 TIMES DAILY
Qty: 14 CAPSULE | Refills: 0 | Status: SHIPPED | OUTPATIENT
Start: 2023-04-13

## 2023-04-13 RX ORDER — GABAPENTIN 400 MG/1
400 CAPSULE ORAL 2 TIMES DAILY
Start: 2023-04-13

## 2023-04-13 RX ORDER — HYDROCODONE BITARTRATE AND ACETAMINOPHEN 5; 325 MG/1; MG/1
1 TABLET ORAL EVERY 6 HOURS PRN
Qty: 12 TABLET | Refills: 0 | Status: SHIPPED | OUTPATIENT
Start: 2023-04-13

## 2023-04-13 RX ORDER — METHOCARBAMOL 750 MG/1
750 TABLET, FILM COATED ORAL 2 TIMES DAILY
Start: 2023-04-13

## 2023-04-13 RX ORDER — AMITRIPTYLINE HYDROCHLORIDE 25 MG/1
25 TABLET, FILM COATED ORAL NIGHTLY
Start: 2023-04-13

## 2023-04-13 RX ORDER — TEMAZEPAM 15 MG/1
30 CAPSULE ORAL NIGHTLY PRN
Start: 2023-04-13

## 2023-04-13 RX ORDER — HYDRALAZINE HYDROCHLORIDE 25 MG/1
25 TABLET, FILM COATED ORAL 2 TIMES DAILY
Start: 2023-04-13

## 2023-04-13 RX ORDER — METOPROLOL SUCCINATE 100 MG/1
100 TABLET, EXTENDED RELEASE ORAL DAILY
Start: 2023-04-13

## 2023-04-13 RX ADMIN — OXYBUTYNIN CHLORIDE 15 MG: 10 TABLET, EXTENDED RELEASE ORAL at 08:17

## 2023-04-13 RX ADMIN — AZTREONAM 2 G: 2 INJECTION, POWDER, LYOPHILIZED, FOR SOLUTION INTRAMUSCULAR; INTRAVENOUS at 00:17

## 2023-04-13 RX ADMIN — Medication 10 ML: at 08:20

## 2023-04-13 RX ADMIN — CLONIDINE HYDROCHLORIDE 0.1 MG: 0.1 TABLET ORAL at 08:17

## 2023-04-13 RX ADMIN — FLUTICASONE PROPIONATE 2 SPRAY: 50 SPRAY, METERED NASAL at 08:19

## 2023-04-13 RX ADMIN — GABAPENTIN 400 MG: 400 CAPSULE ORAL at 08:16

## 2023-04-13 RX ADMIN — METHOCARBAMOL 750 MG: 750 TABLET ORAL at 08:13

## 2023-04-13 RX ADMIN — HYDRALAZINE HYDROCHLORIDE 25 MG: 25 TABLET, FILM COATED ORAL at 08:18

## 2023-04-13 RX ADMIN — APIXABAN 10 MG: 5 TABLET, FILM COATED ORAL at 08:18

## 2023-04-13 RX ADMIN — METOPROLOL SUCCINATE 100 MG: 100 TABLET, EXTENDED RELEASE ORAL at 08:16

## 2023-04-13 RX ADMIN — CITALOPRAM HYDROBROMIDE 20 MG: 20 TABLET ORAL at 08:16

## 2023-04-13 RX ADMIN — ASPIRIN 81 MG: 81 TABLET, COATED ORAL at 08:17

## 2023-04-13 RX ADMIN — AZTREONAM 2 G: 2 INJECTION, POWDER, LYOPHILIZED, FOR SOLUTION INTRAMUSCULAR; INTRAVENOUS at 08:18

## 2023-04-13 RX ADMIN — ATORVASTATIN CALCIUM 20 MG: 20 TABLET, FILM COATED ORAL at 08:17

## 2023-04-13 NOTE — DISCHARGE PLACEMENT REQUEST
"  Please see order to resume home health   Observation stay during admission   Will fax dc summary when available   Discharging home today     Thank you   Sheela Vásquez   [phone]    Gaetano Marcelo (63 y.o. Male)     Date of Birth   1960    Social Security Number       Address   [address] [address]    Home Phone   [phone]    MRN   [id number]       [employer]   [employer]    Marital Status                               Admission Date   4/10/23    Admission Type   Emergency    Admitting Provider   Juma Vo MD    Attending Provider   Juma Vo MD    Department, Room/Bed   [address], S[address]/1       Discharge Date       Discharge Disposition   Home or Self Care    Discharge Destination                               Attending Provider: Juma Vo MD    Allergies: Hydrochlorothiazide, Penicillins    Isolation: None   Infection: None   Code Status: CPR    Ht: 182.9 cm (72\")   Wt: 113 kg (248 lb 14.4 oz)    Admission Cmt: None   Principal Problem: DVT (deep venous thrombosis) [I82.409]                 Active Insurance as of 4/10/2023     Primary Coverage     Payor Plan Insurance Group Employer/Plan Group    [employer] MEDICARE REPLACEMENT UNITED [facility] MEDICARE REPLACEMENT [id number]     Payor Plan Address Payor Plan Phone Number Payor Plan Fax Number Effective Dates    [address] [address]   3/1/2023 - None Entered    [facility] [id number]       Subscriber Name Subscriber Birth Date Member ID       GAETANO MARCELO 1960 [id number]                 Emergency Contacts      (Rel.) Home Phone Work Phone Mobile Phone    MARCELOJOSSUE (Spouse) -- -- [phone]           [address] [address]  Phone:  [phone]  Fax:  [phone]        Patient:     Gaetano Marcelo MRN:  [id number]   [address] [facility] [address] :  1960  SSN:  "   Phone: [phone] Sex:  M      INSURANCE PAYOR PLAN GROUP # SUBSCRIBER ID   Primary:    [facility] MEDICARE REPLACEMENT [id number]   Admitting Diagnosis: DVT (deep venous thrombosis) [I82.409]  Order Date:  2023        Notify Home Health       (Order ID: [id number])     Diagnosis:         Priority:  Routine Expected Date:   Expiration Date:        Interval:  Until Discontinued Count:    Comments: Please resume home health as prior to admission  Remains observation status during admission        Specimen Type:   Specimen Source:   Specimen Taken Date:   Specimen Taken Time:                  Authorizing Provider:Leela Murray PA-C  Authorizing Provider's NPI: [id number]  Order Entered By: Sheela Vásquez RN 2023 11:28 AM     Electronically signed by: Leela Murray PA-C 2023 11:28 AM            History & Physical      Sheela Rich APRN at 04/10/23 1949     Attestation signed by Nataliia Walker DO at 04/10/23 220    Patient seen, examined, and billed independently by [facility]. I was available for questions only.                       [facility] Medicine Services  HISTORY AND PHYSICAL    Patient Name: Gaetano Marcelo  : 1960  MRN: [id number]  Primary Care Physician: Juan Mcclain MD  Date of admission: 4/10/2023    Subjective    Subjective     Chief Complaint:  Right arm swelling    HPI:  Gaetano Marcelo is a 63 y.o. male with history of hypertension, hyperlipidemia, GERD, T2DM, osteoarthritis, was discharged from [facility] on 3/30/2023 with osteomyelitis s/p amputation left third.  Patient was followed by Dr. Donohue with ID and treated with daptomycin and Invanz.  Patient presents to the ED today with complaints of right hand and arm swelling.  Patient reports that he followed up today with his surgeon Dr. Urbina.  After leaving there he went home to take his antibiotic and his wife noticed that his right hand and arm  were swollen.  They had another follow-up appointment this afternoon with Dr. Donohue who ordered an ultrasound of his right upper extremity.  Patient was told that he has a DVT in his right arm and was referred to the ED.  Patient states that he has had shortness of breath, coughing, and wheezing for the past 2 weeks.  He has had diarrhea at least twice daily since starting antibiotics 2 weeks ago.  He states that sometimes he is incontinent of stool and has to wear an adult brief.  He has swelling in his lower extremities.  Today he had significant weakness and was unable to stand up this evening.  His wife does his dressing changes daily and he has home health every week.  No fevers, chills, chest pain, nausea, vomiting, abdominal pain, headaches, dizziness, focal weakness, or any other complaints at this time.  Patient is being admitted to the hospital medicine service for further evaluation and management.        Review of Systems   HENT: Negative.    Eyes: Negative.    Respiratory: Positive for cough, shortness of breath and wheezing.    Cardiovascular: Positive for leg swelling (RLE). Negative for chest pain and palpitations.   Gastrointestinal: Positive for diarrhea. Negative for abdominal distention, abdominal pain, constipation, nausea and vomiting.   Endocrine: Negative.    Genitourinary: Negative.    Musculoskeletal: Negative.    Skin: Positive for wound. Negative for color change and rash.   Allergic/Immunologic: Negative.    Neurological: Positive for weakness. Negative for dizziness, syncope and light-headedness.   Hematological: Negative.    Psychiatric/Behavioral: Negative.             Personal History     Past Medical History:   Diagnosis Date   • GERD (gastroesophageal reflux disease)    • Hyperlipidemia    • Hypertension    • Insomnia    • Low back pain    • Osteoarthritis    • Pain, phantom limb    • Peripheral edema    • Type 2 diabetes mellitus              Past Surgical History:   Procedure  Laterality Date   • APPENDECTOMY     • CARPAL TUNNEL RELEASE Left 08/18/2020    Dr. Yosef Diaz   • CHOLECYSTECTOMY     • HERNIA REPAIR     • REPLACEMENT TOTAL KNEE Right    • KANWAL-EN-Y PROCEDURE  2004   • TOE AMPUTATION Right 2013    1st, 2nd Rt toes   • ULNAR NERVE DECOMPRESSION Left 08/18/2020    Dr. Yosef Diaz       Family History:  family history includes Coronary artery disease in his father; Diabetes in his brother, father, mother, and paternal grandmother.     Social History:  reports that he has never smoked. He has never used smokeless tobacco. He reports that he does not currently use alcohol. He reports that he does not use drugs.  Social History     Social History Narrative   • Not on file       Medications:  5-Hydroxytryptophan, Aspirin Buf(CaCarb-MgCarb-MgO), Bismuth Subgallate, Blood Glucose Monitoring Suppl, Calcium Carbonate-Vit D-Min, Foltanx RF, Ginkgo Biloba, L-Methylfolate-Algae-B12-B6, Lancets 33G, OneTouch Delica Plus Ntlddk35S, Semaglutide (1 MG/DOSE), Vitamin D3, amitriptyline, atorvastatin, citalopram, cloNIDine, cyanocobalamin, esomeprazole, fish oil, fluticasone, gabapentin, glucose blood, hydrALAZINE, loperamide, metFORMIN ER, methocarbamol, metoprolol succinate XL, multivitamin, oxybutynin XL, temazepam, traMADol-acetaminophen, and trospium    Allergies   Allergen Reactions   • Hydrochlorothiazide Rash   • Penicillins Rash       Objective    Objective     Vital Signs:   Temp:  [97.8 °F (36.6 °C)-98.2 °F (36.8 °C)] 97.8 °F (36.6 °C)  Heart Rate:  [78-87] 78  Resp:  [18-20] 18  BP: (129-182)/() 182/115    Physical Exam   Constitutional: Awake, alert, resting in bed, wife at bedside  Eyes: PERRLA, sclerae anicteric, no conjunctival injection  HENT: NCAT, mucous membranes moist  Neck: Supple, no thyromegaly, no lymphadenopathy, trachea midline  Respiratory: Clear to auscultation bilaterally, nonlabored respirations   Cardiovascular: RRR, no murmurs, rubs, or gallops, palpable  pedal pulses bilaterally  Gastrointestinal: Positive bowel sounds, soft, nontender, nondistended  Musculoskeletal: 1+ RLE edema, 2+ LLE edema, RUE edema, no clubbing or cyanosis to extremities  Psychiatric: Appropriate affect, cooperative  Neurologic: Oriented x 3, strength symmetric in all extremities, Cranial Nerves grossly intact to confrontation, speech clear  Skin: No rashes, left foot dressing clean dry intact       Result Review:  I have personally reviewed the results from the time of this admission to 4/10/2023 21:00 EDT and agree with these findings:  [x]  Laboratory list / accordion  []  Microbiology  []  Radiology   []  EKG/Telemetry   []  Cardiology/Vascular   []  Pathology  [x]  Old records  []  Other:  Most notable findings include:     LAB RESULTS:      Lab 04/10/23  2009   WBC 6.29   HEMOGLOBIN 11.8*   HEMATOCRIT 37.7   PLATELETS 303   NEUTROS ABS 3.82   IMMATURE GRANS (ABS) 0.03   LYMPHS ABS 1.62   MONOS ABS 0.61   EOS ABS 0.16   MCV 89.3   PROTIME 14.1   APTT 34.9*   HEPARIN ANTI-XA 0.39         Lab 04/10/23  2009   SODIUM 143   POTASSIUM 4.1   CHLORIDE 109*   CO2 24.0   ANION GAP 10.0   BUN 8   CREATININE 0.78   EGFR 99.0   GLUCOSE 98   CALCIUM 8.6         Lab 04/10/23  2009   TOTAL PROTEIN 5.6*   ALBUMIN 3.1*   GLOBULIN 2.5   ALT (SGPT) 199*   AST (SGOT) 220*   BILIRUBIN 0.2   ALK PHOS 96         Lab 04/10/23  2009   PROTIME 14.1   INR 1.10                 Brief Urine Lab Results     None        Microbiology Results (last 10 days)     Procedure Component Value - Date/Time    Clostridioides difficile Toxin, PCR - Stool, Per Rectum [[id number]]  (Normal) Collected: 04/10/23 1534    Lab Status: Final result Specimen: Stool from Per Rectum Updated: 04/10/23 1650     C. Difficile Toxins by PCR Not Detected    Narrative:      The result indicates the absence of toxigenic C. difficile from stool specimen.           Duplex venous upper extremity right CAR    Result Date: 4/10/2023  •  Sub-acute right  upper extremity deep vein thrombosis noted in the subclavian and axillary. •  Sub-acute right upper extremity superficial thrombophlebitis noted in the basilic (upper arm).           Assessment & Plan   Assessment & Plan       DVT (deep venous thrombosis)    Type 2 diabetes mellitus with complication, without long-term current use of insulin    Hyperlipidemia    Essential hypertension    GERD (gastroesophageal reflux disease)    Elevated LFTs    Diarrhea    Dyspnea      Assessment and Plan:    DVT  --Ultrasound shows subacute right upper extremity deep vein thrombosis in the subclavian and axillary.  Subacute right upper extremity superficial thrombophlebitis in the basilic.  -- Heparin drip  -- Courtesy consult to Dr. Donohue  -- Consult PICC nurse in a.m.  -- labs    Osteomyelitis s/p left third toe partial amputation 3/28 with Dr. Urbina at [facility]  Generalized weakness  -- Surgical shoe with PWB LLE  -- Consult OT/PT  -- Continue daptomycin and Invanz  -- Consult Dr. Donohue with ID  -- Consult WOC in the am  -- consult case management    Dyspnea and cough  -- CTA chest    Elevated LFT's  -- ,   -- acute hepatitis panel  -- CT abdomen pelvis    Diarrhea  -- C. difficile negative  -- CT abdomen pelvis  -- stool cultures    T2DM  -- A1c 5.4% on 3/14/23   -- FSBG with SSI    Hypertension   Hyperlipidemia  -- Continue clonidine, hydralazine, metoprolol  -- statin    GERD  -- PPI      DVT prophylaxis:  On heparin drip    CODE STATUS:    Level Of Support Discussed With: Patient  Code Status (Patient has no pulse and is not breathing): CPR (Attempt to Resuscitate)  Medical Interventions (Patient has pulse or is breathing): Full Support      Expected Discharge  TBD    This note has been completed as part of a split-shared workflow.     Signature: Electronically signed by EMILIA Pena, 04/10/23, 9:01 PM EDT.  Total time spent: 60 minutes  Time spent includes time reviewing chart,  face-to-face time, counseling patient/family/caregiver, ordering medications/tests/procedures, communicating with other health care professionals, documenting clinical information in the electronic health record, and coordination of care.                 Electronically signed by Nataliia Walker DO at 04/10/23 2383

## 2023-04-13 NOTE — DISCHARGE SUMMARY
[facility] Hospital Medicine Services  DISCHARGE SUMMARY    Patient Name: Gaetano Marcelo  : 1960  MRN: [id number]    Date of Admission: 4/10/2023  7:22 PM  Date of Discharge: 2023  Primary Care Physician: Juan Mcclain MD    Consults     Date and Time Order Name Status Description    2023 12:34 AM Inpatient Infectious Diseases Consult Completed         Hospital Course     Presenting Problem:   DVT (deep venous thrombosis) [I82.409]    Active Hospital Problems    Diagnosis  POA   • **DVT (deep venous thrombosis) [I82.409]  Yes   • Elevated LFTs [R79.89]  Yes   • Diarrhea [R19.7]  Yes   • Dyspnea [R06.00]  Yes   • GERD (gastroesophageal reflux disease) [K21.9]  Yes   • Type 2 diabetes mellitus with complication, without long-term current use of insulin [E11.8]  Yes   • Essential hypertension [I10]  Yes   • Hyperlipidemia [E78.5]  Yes      Resolved Hospital Problems   No resolved problems to display.      Hospital Course:  Gaetano Marcelo is a 63 y.o. male with PMH significant for HTN, HLD, DMII, GERD and PAD. He is s/p L third toe partial amputation on 3/29/23 by Dr. Urbina. Pre-op wound culture (+) Enterobacter species. Pathology did show osteomyelitis at the distal phalanx but bone margin was free of inflammation per pathology - felt to be removable focus of infection. Dr. Donohue of [facility] followed. A PICC was placed and he was discharged on IV Daptomycin/Invanz. He followed up with Dr. Donohue in the office on 4/10/23 with complaints of RUE edema. STAT RUE duplex showed a subacute RUE DVT in the subclavian/axillary vein and subacute RUE superficial thrombophlebitis in the basilic vein. Admitted to [facility] for further treatment/evaluation.      PICC associated RUE DVT  - RUE duplex showed subacute right upper extremity deep vein thrombosis noted in the subclavian and axillary veins as well as subacute right upper extremity superficial thrombophlebitis noted in the  basilic (upper arm)  - s/p heparin gtt - now on Eliquis 10mg BID x 7 days, then 5mg BID thereafter. Course of therapy likely 3-6 months.  - PICC removed     Left foot/third toe infection with osteomyelitis  - s/p 3rd toe amputation 3/29/23 by Dr. Urbina at [facility] - cultures (+) Enterobacter  - Invanz discontinued. Received Daptomycin/Aztreonam during this hospitalization  - At [facility], transition to Doxycycline 100mg BID - follow up with Dr. Donohue tomorrow      Elevated LFTs  - On admission, , , normal bilirubin.  INR 1.1  - Liver appeared normal on CT abdomen/pelvis from admission  - Mild LFT elevation at [facility] on 3/30 (AST 30, ALT 65, bili 0.2), with increase on 4/3 (, ALT 98)  - Secondary to Invanz? - has since been discontinued  - Discussed results with patient and wife - may be medication-related. Bilirubin reassuring. Acute hepatitis panel negative. Recommend outpatient monitoring and further work up if transaminitis persists despite resolution of acute issues      Diarrhea, improving  - GI PCR and C. Diff negative  - PRN Imodium      PVD  - Followed by Dr. Robbie Vazquez of vascular surgery. Scheduled to have follow up today - will need to reschedule prior to DC   - Was apparently placed on low dose xarelto by vascular surgery during recent hospitalization  - Xarelto stopped and now on Eliquis     DMII  Diabetic neuropathy  - A1c 5.0% - resume home regimen at [facility]   - Follows with Dr. Talbot of endocrinology - Metformin dose recently reduced      HTN  - Continue Metoprolol, Hydralazine and Clonidine     Follow up with Dr. Donohue of [facility] tomorrow  We will reschedule outpatient follow up with Dr. Hendrix of vascular surgery  Wife plans to call Dr. Urbina to move outpatient follow up appointment   Follow up with PCP next week    Day of Discharge     HPI:   In bed. Feeling fine with no new complaints today. Diarrhea not gone but has slowed down. He'd like to go home today. We discussed discharge plans in detail at  bedside     Review of Systems  Gen- No fevers, chills  CV- No chest pain, palpitations  Resp- No cough, dyspnea  GI- No N/V or abd pain, (+) loose stools    Vital Signs:   Temp:  [98.2 °F (36.8 °C)-98.7 °F (37.1 °C)] 98.7 °F (37.1 °C)  Heart Rate:  [76-88] 78  Resp:  [16-18] 18  BP: (118-137)/(52-91) 118/63    Physical Exam:  Constitutional: No acute distress, awake, alert and conversational. Obese body habitus. Sitting in bed   HENT: NCAT, mucous membranes moist  Respiratory: Clear to auscultation bilaterally, respiratory effort normal   Cardiovascular: RRR, no murmurs, rubs, or gallops  Gastrointestinal: Positive bowel sounds, soft, nontender, nondistended  Musculoskeletal: No bilateral ankle edema. (+) RUE edema. L foot dressed - I did not remove dressing for exam  Psychiatric: Appropriate affect, cooperative  Neurologic: Oriented x 3, moves all extremities spontaneously without focal deficits, speech clear  Skin: No rashes    Pertinent  and/or Most Recent Results     LAB RESULTS:      Lab 04/12/23  0513 04/11/23  0949 04/11/23  0256 04/10/23  2009   WBC 4.82  --  5.48 6.29   HEMOGLOBIN 10.6*  --  10.6* 11.8*   HEMATOCRIT 34.0*  --  33.8* 37.7   PLATELETS 273  --  277 303   NEUTROS ABS  --   --  3.15 3.82   IMMATURE GRANS (ABS)  --   --  0.02 0.03   LYMPHS ABS  --   --  1.52 1.62   MONOS ABS  --   --  0.56 0.61   EOS ABS  --   --  0.20 0.16   MCV 89.5  --  89.4 89.3   PROTIME 15.4*  --   --  14.1   APTT  --  106.6* 50.5* 34.9*   HEPARIN ANTI-XA  --   --   --  0.39         Lab 04/13/23  0649 04/12/23  0513 04/11/23  0256 04/10/23  2009   SODIUM 142 140 142 143   POTASSIUM 4.5 4.2 3.8 4.1   CHLORIDE 111* 110* 110* 109*   CO2 24.0 25.0 25.0 24.0   ANION GAP 7.0 5.0 7.0 10.0   BUN 9 9 8 8   CREATININE 0.83 0.82 0.94 0.78   EGFR 97.1 97.5 90.0 99.0   GLUCOSE 79 87 98 98   CALCIUM 8.3* 8.2* 8.0* 8.6   HEMOGLOBIN A1C  --  5.00  --   --          Lab 04/13/23  0649 04/12/23  0513 04/11/23  0256 04/10/23  2009   TOTAL  PROTEIN 4.4* 4.4* 5.0* 5.6*   ALBUMIN 2.6* 2.5* 2.6* 3.1*   GLOBULIN 1.8 1.9 2.4 2.5   ALT (SGPT) 193* 146* 180* 199*   AST (SGOT) 258* 135* 222* 220*   BILIRUBIN 0.2 0.2 0.2 0.2   ALK PHOS 85 82 86 96         Lab 04/12/23  0513 04/10/23  2009   PROTIME 15.4* 14.1   INR 1.23* 1.10     Brief Urine Lab Results     None        Microbiology Results (last 10 days)     Procedure Component Value - Date/Time    Gastrointestinal Panel, PCR - Stool, Per Rectum [[id number]]  (Normal) Collected: 04/10/23 2100    Lab Status: Final result Specimen: Stool from Per Rectum Updated: 04/11/23 0948     Campylobacter Not Detected     Plesiomonas shigelloides Not Detected     Salmonella Not Detected     Vibrio Not Detected     Vibrio cholerae Not Detected     Yersinia enterocolitica Not Detected     Enteroaggregative E. coli (EAEC) Not Detected     Enteropathogenic E. coli (EPEC) Not Detected     Enterotoxigenic E. coli (ETEC) lt/st Not Detected     Shiga-like toxin-producing E. coli (STEC) stx1/stx2 Not Detected     Shigella/Enteroinvasive E. coli (EIEC) Not Detected     Cryptosporidium Not Detected     Cyclospora cayetanensis Not Detected     Entamoeba histolytica Not Detected     Giardia lamblia Not Detected     Adenovirus F40/41 Not Detected     Astrovirus Not Detected     Norovirus GI/GII Not Detected     Rotavirus A Not Detected     Sapovirus (I, II, IV or V) Not Detected    Clostridioides difficile Toxin, PCR - Stool, Per Rectum [[id number]]  (Normal) Collected: 04/10/23 1534    Lab Status: Final result Specimen: Stool from Per Rectum Updated: 04/10/23 1650     C. Difficile Toxins by PCR Not Detected    Narrative:      The result indicates the absence of toxigenic C. difficile from stool specimen.         CT Abdomen Pelvis Without Contrast    Result Date: 4/10/2023  CT ABDOMEN PELVIS WO CONTRAST, CT ANGIOGRAM CHEST Date of Exam: 4/10/2023 9:09 PM EDT Indication: diarrhea x 2 weeks, elevated LFT's. The Comparison: None available.  Technique: Axial CT images were obtained of the abdomen and pelvis without the administration of contrast. Reconstructed coronal and sagittal images were also obtained. Automated exposure control and iterative construction methods were used. Findings: CTA chest: There is no pathologic axillary adenopathy or other worrisome body wall soft tissue finding in the chest. No acute findings are present in the partially characterized upper abdomen. There is no pleural or pericardial effusion. There is no pathologic mediastinal adenopathy. Mildly atherosclerotic, nonaneurysmal thoracic aorta. The pulmonary arteries are well-opacified and without evidence of focal filling defect concerning for acute pulmonary embolus. Evaluation of the osseous structures demonstrates no evidence of acute fracture or aggressive osseous lesion, with multilevel thoracic spondylosis change present. Evaluation of the lung fields demonstrate some mild scattered atelectasis, otherwise without evidence of acute infectious or inflammatory process. There are no distinct suspicious pulmonary nodules. Abdomen and pelvis: The lung bases are grossly clear. Evaluation of the body wall soft tissues demonstrates moderate diffuse anasarca. The liver, spleen, pancreas and bilateral adrenal glands demonstrate no acute or suspicious findings. The pancreas is diffusely atrophic. Postoperative changes are present from prior gastric bypass. Small and large bowel loops are nondilated. There is no suspicious focal bowel wall thickening. There is no free fluid or pneumoperitoneum. Atherosclerotic abdominal aorta. Air-fluid levels are present consistent with provided history of diarrheal state. The pelvic viscera demonstrate no acute findings. The kidneys demonstrate no evidence of stones, hydronephrosis or nephropathy.     Impression: No acute pulmonary embolus or other acute finding in the chest. Moderate diffuse anasarca is present. There are also air-fluid levels  present throughout nondistended segments of bowel consistent with diarrheal state. No acute findings in the abdomen or pelvis otherwise. Electronically Signed: Shahid Ash  4/10/2023 10:00 PM EDT  Workstation ID: [id number]    Duplex venous upper extremity right CAR    Result Date: 4/10/2023  •  Sub-acute right upper extremity deep vein thrombosis noted in the subclavian and axillary. •  Sub-acute right upper extremity superficial thrombophlebitis noted in the basilic (upper arm).     CT Angiogram Chest    Result Date: 4/10/2023  CT ABDOMEN PELVIS WO CONTRAST, CT ANGIOGRAM CHEST Date of Exam: 4/10/2023 9:09 PM EDT Indication: diarrhea x 2 weeks, elevated LFT's. The Comparison: None available. Technique: Axial CT images were obtained of the abdomen and pelvis without the administration of contrast. Reconstructed coronal and sagittal images were also obtained. Automated exposure control and iterative construction methods were used. Findings: CTA chest: There is no pathologic axillary adenopathy or other worrisome body wall soft tissue finding in the chest. No acute findings are present in the partially characterized upper abdomen. There is no pleural or pericardial effusion. There is no pathologic mediastinal adenopathy. Mildly atherosclerotic, nonaneurysmal thoracic aorta. The pulmonary arteries are well-opacified and without evidence of focal filling defect concerning for acute pulmonary embolus. Evaluation of the osseous structures demonstrates no evidence of acute fracture or aggressive osseous lesion, with multilevel thoracic spondylosis change present. Evaluation of the lung fields demonstrate some mild scattered atelectasis, otherwise without evidence of acute infectious or inflammatory process. There are no distinct suspicious pulmonary nodules. Abdomen and pelvis: The lung bases are grossly clear. Evaluation of the body wall soft tissues demonstrates moderate diffuse anasarca. The liver, spleen, pancreas and  bilateral adrenal glands demonstrate no acute or suspicious findings. The pancreas is diffusely atrophic. Postoperative changes are present from prior gastric bypass. Small and large bowel loops are nondilated. There is no suspicious focal bowel wall thickening. There is no free fluid or pneumoperitoneum. Atherosclerotic abdominal aorta. Air-fluid levels are present consistent with provided history of diarrheal state. The pelvic viscera demonstrate no acute findings. The kidneys demonstrate no evidence of stones, hydronephrosis or nephropathy.     Impression: No acute pulmonary embolus or other acute finding in the chest. Moderate diffuse anasarca is present. There are also air-fluid levels present throughout nondistended segments of bowel consistent with diarrheal state. No acute findings in the abdomen or pelvis otherwise. Electronically Signed: Shahid Ash  4/10/2023 10:00 PM EDT  Workstation ID: [id number]    Results for orders placed during the hospital encounter of 04/10/23    Duplex venous upper extremity right CAR    Interpretation Summary  •  Sub-acute right upper extremity deep vein thrombosis noted in the subclavian and axillary.  •  Sub-acute right upper extremity superficial thrombophlebitis noted in the basilic (upper arm).    Discharge Details        Discharge Medications      New Medications      Instructions Start Date   apixaban 5 MG tablet tablet  Commonly known as: ELIQUIS   10 mg, Oral, Every 12 Hours Scheduled      apixaban 5 MG tablet tablet  Commonly known as: ELIQUIS   Take 2 tablets by mouth Every 12 (Twelve) Hours for 10 doses. THEN Take 1 tablet by mouth Every 12 (Twelve) Hours.   Start Date: April 18, 2023     doxycycline 100 MG capsule  Commonly known as: VIBRAMYCIN   100 mg, Oral, 2 Times Daily         Changes to Medications      Instructions Start Date   amitriptyline 25 MG tablet  Commonly known as: ELAVIL  What changed:   · how much to take  · how to take this  · when to take this    25 mg, Oral, Nightly      esomeprazole 20 MG capsule  Commonly known as: nexIUM  What changed:   · how much to take  · how to take this  · when to take this   20 mg, Oral, Every Morning Before Breakfast      gabapentin 400 MG capsule  Commonly known as: NEURONTIN  What changed: See the new instructions.   400 mg, Oral, 2 Times Daily      hydrALAZINE 25 MG tablet  Commonly known as: APRESOLINE  What changed: See the new instructions.   25 mg, Oral, 2 Times Daily      METANX PO  What changed: Another medication with the same name was removed. Continue taking this medication, and follow the directions you see here.   Oral, 2 Times Daily      methocarbamol 750 MG tablet  Commonly known as: ROBAXIN  What changed:   · how much to take  · how to take this  · when to take this   750 mg, Oral, 2 Times Daily      metoprolol succinate  MG 24 hr tablet  Commonly known as: TOPROL-XL  What changed: See the new instructions.   100 mg, Oral, Daily      temazepam 15 MG capsule  Commonly known as: RESTORIL  What changed:   · how much to take  · how to take this  · when to take this  · reasons to take this   30 mg, Oral, Nightly PRN         Continue These Medications      Instructions Start Date   5-HTP PO   5-HTP      Aspirin Buf(CaCarb-MgCarb-MgO) 81 MG tablet   aspirin 81 mg tablet   Daily      atorvastatin 20 MG tablet  Commonly known as: LIPITOR   20 mg, Oral, Daily      Blood Glucose Monitoring Suppl device   Using to test glucose.      CALCIUM 1200 PO   1,200 mcg, Oral      citalopram 20 MG tablet  Commonly known as: CeleXA   citalopram 20 mg tablet      cloNIDine 0.1 MG tablet  Commonly known as: CATAPRES   0.1 mg, 2 Times Daily      cyanocobalamin 1000 MCG tablet  Commonly known as: VITAMIN B-12   Vitamin B-12  1,000 mcg tablet   TAKE 1 TABLET BY MOUTH ONCE DAILY      DEVROM PO   Oral      fish oil 1000 MG capsule capsule   Oral, Daily With Breakfast      fluticasone 50 MCG/ACT nasal spray  Commonly known as: FLONASE    Flonase Allergy Relief 50 mcg/actuation nasal spray,suspension   2 sprays each nostril once daily      Ginkgo Biloba 120 MG capsule   ginkgo biloba 40 mg capsule   daily      loperamide 2 MG capsule  Commonly known as: IMODIUM   loperamide 2 mg capsule      metFORMIN  MG 24 hr tablet  Commonly known as: GLUCOPHAGE-XR   500 mg, Oral, 2 Times Daily      multivitamin tablet tablet   Multiple Vitamin capsule      multivitamin tablet tablet   Super B Complex tablet      OneTouch Delica Plus [id number] misc   1 each, Does not apply, 3 Times Daily      Lancets 33G misc   1 each, Does not apply, 3 Times Daily      OneTouch Verio test strip  Generic drug: glucose blood   Testing 3 times daily      glucose blood test strip   Testing 3 times daily.      oxybutynin XL 15 MG 24 hr tablet  Commonly known as: DITROPAN XL   15 mg, Oral, Daily      Ozempic (1 MG/DOSE) 4 MG/3ML solution pen-injector  Generic drug: Semaglutide (1 MG/DOSE)   1 mg, Subcutaneous, Weekly      trospium 20 MG tablet  Commonly known as: SANCTURA   No dose, route, or frequency recorded.      Vitamin D3 1.25 MG (92069 UT) capsule   Vitamin D3   Daily         Stop These Medications    traMADol-acetaminophen 37.5-325 MG per tablet  Commonly known as: ULTRACET          Allergies   Allergen Reactions   • Hydrochlorothiazide Rash   • Penicillins Rash     Discharge Disposition:  Home or Self Care    Diet:  Diet Order   Procedures   • Diet: Cardiac Diets, Diabetic Diets; Healthy Heart (2-3 Na+); Consistent Carbohydrate; Texture: Regular Texture (IDDSI 7); Fluid Consistency: Thin (IDDSI 0)      CODE STATUS:    Code Status and Medical Interventions:   Ordered at: 04/10/23 2058     Level Of Support Discussed With:    Patient     Code Status (Patient has no pulse and is not breathing):    CPR (Attempt to Resuscitate)     Medical Interventions (Patient has pulse or is breathing):    Full Support     No future appointments.    Additional Instructions for the  Follow-ups that You Need to Schedule     Discharge Follow-up with PCP   As directed       Currently Documented PCP:    Juan Mcclain MD    PCP Phone Number:    [phone]     Follow Up Details: 7-10 days         Discharge Follow-up with Specified Provider: Dr. Donohue ([facility]) tomorrow   As directed      To: Dr. Donohue ([facility]) tomorrow         Discharge Follow-up with Specified Provider: Robbie Hendrix (vascular surgery) - [phone] - missed appointment yesterday due to hospitalization. Follow up next week please   As directed      To: Robbie Hendrix (vascular surgery) - [phone] - missed appointment yesterday due to hospitalization. Follow up next week please             Leela Murray PA-C  04/13/23    Time Spent on Discharge:  I spent 35 minutes on this discharge activity which included: face-to-face encounter with the patient, reviewing the data in the system, coordination of the care with the nursing staff as well as consultants, documentation, and entering orders.

## 2023-04-13 NOTE — PLAN OF CARE
Problem: Adult Inpatient Plan of Care  Goal: Absence of Hospital-Acquired Illness or Injury  Intervention: Identify and Manage Fall Risk  Description: Perform standard risk assessment on admission using a validated tool or comprehensive approach appropriate to the patient; reassess fall risk frequently, with change in status or transfer to another level of care.  Communicate fall injury risk to interprofessional healthcare team.  Determine need for increased observation, equipment and environmental modification, such as low bed, signage and supportive, nonskid footwear.  Adjust safety measures to individual developmental age, stage and identified risk factors.  Reinforce the importance of safety and physical activity with patient and family.  Perform regular intentional rounding to assess need for position change, pain assessment and personal needs, including assistance with toileting.  Recent Flowsheet Documentation  Taken 4/13/2023 0000 by Eligio Inman, RN  Safety Promotion/Fall Prevention:   activity supervised   assistive device/personal items within reach   clutter free environment maintained   elopement precautions   fall prevention program maintained   nonskid shoes/slippers when out of bed   safety round/check completed  Taken 4/12/2023 2200 by Eligio Inman, RN  Safety Promotion/Fall Prevention:   activity supervised   assistive device/personal items within reach   clutter free environment maintained   elopement precautions   fall prevention program maintained   nonskid shoes/slippers when out of bed   safety round/check completed  Taken 4/12/2023 2000 by Eligio Inman, RN  Safety Promotion/Fall Prevention:   activity supervised   assistive device/personal items within reach   clutter free environment maintained   elopement precautions   fall prevention program maintained   nonskid shoes/slippers when out of bed   safety round/check completed  Intervention: Prevent Skin Injury  Description: Perform a screening  for skin injury risk, such as pressure or moisture associated skin damage on admission and at regular intervals throughout hospital stay.  Keep all areas of skin (especially folds) clean and dry.  Maintain adequate skin hydration.  Relieve and redistribute pressure and protect bony prominences; implement measures based on patient-specific risk factors.  Match turning and repositioning schedule to clinical condition.  Encourage weight shift frequently; assist with reposition if unable to complete independently.  Float heels off bed; avoid pressure on the Achilles tendon.  Keep skin free from extended contact with medical devices.  Encourage functional activity and mobility, as early as tolerated.  Use aids (e.g., slide boards, mechanical lift) during transfer.  Recent Flowsheet Documentation  Taken 4/13/2023 0000 by Eligio Inman RN  Body Position: position changed independently  Taken 4/12/2023 2200 by Eligio Inman RN  Body Position: position changed independently  Taken 4/12/2023 2000 by Eligio Inman RN  Body Position: position changed independently  Skin Protection:   adhesive use limited   incontinence pads utilized   protective footwear used   skin-to-skin areas padded   tubing/devices free from skin contact  Intervention: Prevent and Manage VTE (Venous Thromboembolism) Risk  Description: Assess for VTE (venous thromboembolism) risk.  Encourage and assist with early ambulation.  Initiate and maintain compression or other therapy, as indicated, based on identified risk in accordance with organizational protocol and provider order.  Encourage both active and passive leg exercises while in bed, if unable to ambulate.  Recent Flowsheet Documentation  Taken 4/13/2023 0000 by Eligio Inman RN  Activity Management: dorsiflexion/plantar flexion performed  Range of Motion: ROM (range of motion) performed  Taken 4/12/2023 2200 by Eligio Inman RN  Activity Management: dorsiflexion/plantar flexion performed  Taken 4/12/2023  2000 by Eligio Inman RN  Activity Management: dorsiflexion/plantar flexion performed  Range of Motion: ROM (range of motion) performed  Intervention: Prevent Infection  Description: Maintain skin and mucous membrane integrity; promote hand, oral and pulmonary hygiene.  Optimize fluid balance, nutrition, sleep and glycemic control to maximize infection resistance.  Identify potential sources of infection early to prevent or mitigate progression of infection (e.g., wound, lines, devices).  Evaluate ongoing need for invasive devices; remove promptly when no longer indicated.  Recent Flowsheet Documentation  Taken 4/12/2023 2000 by Eligio Inman RN  Infection Prevention:   environmental surveillance performed   hand hygiene promoted   rest/sleep promoted   single patient room provided  Goal: Optimal Comfort and Wellbeing  Intervention: Provide Person-Centered Care  Description: Use a family-focused approach to care.  Develop trust and rapport by proactively providing information, encouraging questions, addressing concerns and offering reassurance.  Acknowledge emotional response to hospitalization.  Recognize and utilize personal coping strategies.  Honor spiritual and cultural preferences.  Recent Flowsheet Documentation  Taken 4/12/2023 2000 by Eligio Inman RN  Trust Relationship/Rapport:   care explained   choices provided   emotional support provided   empathic listening provided   questions answered   questions encouraged   reassurance provided   thoughts/feelings acknowledged     Problem: Fall Injury Risk  Goal: Absence of Fall and Fall-Related Injury  Outcome: Ongoing, Progressing  Intervention: Identify and Manage Contributors  Description: Develop a fall prevention plan with the patient and caregiver/family.  Provide reorientation, appropriate sensory stimulation and routines with changes in mental status to decrease risk of fall.  Promote use of personal vision and auditory aids.  Assess assistance level  required for safe and effective self-care; provide support as needed, such as toileting, mobilization. For age 65 and older, implement timed toileting with assistance.  Encourage physical activity, such as performance of mobility and self-care at highest level of patient ability, multicomponent exercise program and provision of appropriate assistive devices.  If fall occurs, assess the severity of injury; implement fall injury protocol. Determine the cause and revise fall injury prevention plan.  Regularly review medication contribution to fall risk; adjust medication administration times to minimize risk of falling.  Consider risk related to polypharmacy and age.  Balance adequate pain management with potential for oversedation.  Flowsheets  Taken 4/13/2023 0032  Self-Care Promotion:   independence encouraged   BADL personal objects within reach  Taken 4/12/2023 2000  Medication Review/Management:   medications reviewed   high-risk medications identified  Intervention: Promote Injury-Free Environment  Description: Provide a safe, barrier-free environment that encourages independent activity.  Keep care area uncluttered and well-lighted.  Determine need for increased observation or monitoring.  Avoid use of devices that minimize mobility, such as restraints or indwelling urinary catheter.  Flowsheets  Taken 4/13/2023 0000  Safety Promotion/Fall Prevention:   activity supervised   assistive device/personal items within reach   clutter free environment maintained   elopement precautions   fall prevention program maintained   nonskid shoes/slippers when out of bed   safety round/check completed  Taken 4/12/2023 2200  Safety Promotion/Fall Prevention:   activity supervised   assistive device/personal items within reach   clutter free environment maintained   elopement precautions   fall prevention program maintained   nonskid shoes/slippers when out of bed   safety round/check completed  Taken 4/12/2023 2000  Safety  Promotion/Fall Prevention:   activity supervised   assistive device/personal items within reach   clutter free environment maintained   elopement precautions   fall prevention program maintained   nonskid shoes/slippers when out of bed   safety round/check completed     Problem: VTE (Venous Thromboembolism)  Goal: VTE (Venous Thromboembolism) Symptom Resolution  Intervention: Prevent or Manage VTE (Venous Thromboembolism)  Description: Perform ongoing VTE (venous thromboembolism) risk assessment; diminish risk when possible (e.g., remove central venous catheter, shorten intensive care unit stay).  Promote early mobilization; encourage both active and passive leg exercises, if unable to ambulate.  Promote fluid intake.  Implement mechanical prevention measures (e.g., properly-fitted compression stockings, intermittent-pneumatic-compression device).  Monitor and manage anticoagulant therapy to ensure therapeutic level.  Watch for overt and covert signs of bleeding; provide hemodynamic support if present.  Provide oxygen therapy judiciously to avoid hyperoxemia; adjust to achieve oxygenation goal.  Anticipate need for procedure to improve perfusion or prevent thrombus migration.  Recent Flowsheet Documentation  Taken 4/13/2023 0000 by Eligio Inman RN  Bleeding Precautions:   monitored for signs of bleeding   blood pressure closely monitored  Taken 4/12/2023 2000 by Eligio Inman, RN  Bleeding Precautions:   monitored for signs of bleeding   blood pressure closely monitored   Goal Outcome Evaluation:

## 2023-04-13 NOTE — PLAN OF CARE
Goal Outcome Evaluation:            VSS, RA, SR. Patient meets criteria for discharge with home health

## 2023-04-13 NOTE — PROGRESS NOTES
Gaetano Marcelo  1960  [id number]     Chief Complaint:  Diarrhea, right arm swelling/thrombosis    Reason for Consultation: Diarrhea, right arm thrombosis; left foot infection    History of present illness:     Patient is a 63 y.o.  Yr old male pt of Dr Donohue; with history of diabetes and peripheral arterial disease, follows with Tobin Donohue/Simeon/Carlitos; he had left third toe partial amputation proximally March 29 by Dr. Urbina at [facility], wound culture before that had Enterobacter species.  Pathology did have osteomyelitis at the distal phalanx but bone margin was free of inflammation per pathology and per my discussion with Dr. Urbina, he reports bone focus removal.  Patient was transitioned to IV daptomycin/Invanz for ongoing antibiotic therapy by Dr. Donohue.  He was seen in the office April 10 with swollen right arm, sent for stat duplex with evidence for subacute right upper extremity DVT in the subclavian/axillary vein and subacute right upper extremity superficial thrombophlebitis in the basilic vein.  He was admitted to the hospital.    4/11/23 PICC line removal    4/13/23 he is hoping for home.  He feels improving with no diarrhea today.he denies any new focal pain.  Nursing reports no new distress. Left foot with some dull discomfort, intermittent, nonradiating, worse with pressure, better with pain meds and 2-3 out of 10 in severity, worse near the third toe surgical site.  No new redness or drainage.       No headache photophobia or neck stiffness.  No shortness of breath cough or hemoptysis.  No nausea or vomiting.  Denies hematochezia melena or hematemesis.  No abdominal pain at present.  No dysuria hematuria or pyuria.  No other new skin rash.       Past Medical History:   Diagnosis Date   • GERD (gastroesophageal reflux disease)    • Hyperlipidemia    • Hypertension    • Insomnia    • Low back pain    • Osteoarthritis    • Pain, phantom limb    • Peripheral edema    • Type 2 diabetes mellitus   "      Past Surgical History:   Procedure Laterality Date   • APPENDECTOMY     • CARPAL TUNNEL RELEASE Left 08/18/2020    Dr. Yosef Diaz   • CHOLECYSTECTOMY     • HERNIA REPAIR     • REPLACEMENT TOTAL KNEE Right    • KANWAL-EN-Y PROCEDURE  2004   • TOE AMPUTATION Right 2013    1st, 2nd Rt toes   • ULNAR NERVE DECOMPRESSION Left 08/18/2020    Dr. Yosef Diaz       Pediatric History   Patient Parents   • Not on file     Other Topics Concern   • Not on file   Social History Narrative   • Not on file       family history includes Coronary artery disease in his father; Diabetes in his brother, father, mother, and paternal grandmother.    Allergies   Allergen Reactions   • Hydrochlorothiazide Rash   • Penicillins Rash        Review of Systems    4/13/23     Constitutional-- No Fever, chills or sweats.  Appetite good, and no malaise. No fatigue.  Heent-- No new vision, hearing or throat complaints.  No epistaxis or oral sores.  Denies odynophagia or dysphagia.  No flashers, floaters or eye pain. No odynophagia or dysphagia. No headache, photophobia or neck stiffness.  CV-- No chest pain, palpitation or syncope  Resp-- No SOB/cough/Hemoptysis  GI-  See above.  No hematochezia, melena, or hematemesis. Denies jaundice or chronic liver disease.  -- No dysuria, hematuria, or flank pain.  Denies hesitancy, urgency or flank pain.  Lymph- no swollen lymph nodes in neck/axilla or groin.   Heme- No active bruising or bleeding   MS-- no swelling or pain in the bones or joints of arms/legs aside from above.  No new back pain.  Neuro-- No acute focal weakness or numbness in the arms or legs.  No seizures.    Full 12 point review of systems reviewed and negative otherwise for acute complaints, except for above    Physical Exam: nursing/chaperone present  Vital Signs   /63   Pulse 78   Temp 98.7 °F (37.1 °C) (Oral)   Resp 18   Ht 182.9 cm (72\")   Wt 113 kg (248 lb 14.4 oz)   SpO2 96%   BMI 33.76 kg/m²     GENERAL: Awake " and alert, in no acute distress.   HEENT: Normocephalic, atraumatic.   No conjunctival injection. No icterus. Oropharynx clear without evidence of thrush or exudate. No evidence of peridontal disease.    NECK: Supple without nuchal rigidity. No mass.  HEART: RRR; No murmur, rubs, gallops.   LUNGS: Clear to auscultation bilaterally without wheezing, rales, rhonchi. Normal respiratory effort. Nonlabored. No dullness.  ABDOMEN: Soft, nontender, nondistended. Positive bowel sounds. No rebound or guarding. NO mass or HSM.  EXT:  No cyanosis, clubbing ;No cord.   MSK: FROM without joint effusions noted arms/legs.    SKIN: Warm and dry without cutaneous eruptions on Inspection/palpation.    NEURO: Oriented to PPT.  He has difficult time cooperating with a detailed motor or sensory exam given positioning in bed      Left foot noted, surgical site with no obvious purulent drainage.  Mild erythema at the remaining portion of his third digit but no discrete mass bulge or fluctuance.  No crepitus or bulla. Third digit black on the plantar side    No peripheral stigmata such phenomena of endocarditis      Laboratory Data    Results from last 7 days   Lab Units 04/12/23  0513 04/11/23  0256 04/10/23  2009   WBC 10*3/mm3 4.82 5.48 6.29   HEMOGLOBIN g/dL 10.6* 10.6* 11.8*   HEMATOCRIT % 34.0* 33.8* 37.7   PLATELETS 10*3/mm3 273 277 303     Results from last 7 days   Lab Units 04/13/23  0649   SODIUM mmol/L 142   POTASSIUM mmol/L 4.5   CHLORIDE mmol/L 111*   CO2 mmol/L 24.0   BUN mg/dL 9   CREATININE mg/dL 0.83   GLUCOSE mg/dL 79   CALCIUM mg/dL 8.3*     Results from last 7 days   Lab Units 04/13/23  0649   ALK PHOS U/L 85   BILIRUBIN mg/dL 0.2   ALT (SGPT) U/L 193*   AST (SGOT) U/L 258*               Estimated Creatinine Clearance: 115.2 mL/min (by C-G formula based on SCr of 0.83 mg/dL).      Microbiology:      Radiology:  Imaging Results (Last 72 Hours)     Procedure Component Value Units Date/Time    CT Angiogram Chest  [[id number]] Collected: 04/10/23 2150     Updated: 04/10/23 2203    Narrative:      CT ABDOMEN PELVIS WO CONTRAST, CT ANGIOGRAM CHEST    Date of Exam: 4/10/2023 9:09 PM EDT    Indication: diarrhea x 2 weeks, elevated LFT's.   The  Comparison: None available.    Technique: Axial CT images were obtained of the abdomen and pelvis without the administration of contrast. Reconstructed coronal and sagittal images were also obtained. Automated exposure control and iterative construction methods were used.    Findings:  CTA chest: There is no pathologic axillary adenopathy or other worrisome body wall soft tissue finding in the chest. No acute findings are present in the partially characterized upper abdomen. There is no pleural or pericardial effusion. There is no   pathologic mediastinal adenopathy. Mildly atherosclerotic, nonaneurysmal thoracic aorta. The pulmonary arteries are well-opacified and without evidence of focal filling defect concerning for acute pulmonary embolus. Evaluation of the osseous structures   demonstrates no evidence of acute fracture or aggressive osseous lesion, with multilevel thoracic spondylosis change present. Evaluation of the lung fields demonstrate some mild scattered atelectasis, otherwise without evidence of acute infectious or   inflammatory process. There are no distinct suspicious pulmonary nodules.    Abdomen and pelvis: The lung bases are grossly clear. Evaluation of the body wall soft tissues demonstrates moderate diffuse anasarca. The liver, spleen, pancreas and bilateral adrenal glands demonstrate no acute or suspicious findings. The pancreas is   diffusely atrophic. Postoperative changes are present from prior gastric bypass. Small and large bowel loops are nondilated. There is no suspicious focal bowel wall thickening. There is no free fluid or pneumoperitoneum. Atherosclerotic abdominal aorta.   Air-fluid levels are present consistent with provided history of diarrheal state.  The pelvic viscera demonstrate no acute findings. The kidneys demonstrate no evidence of stones, hydronephrosis or nephropathy.      Impression:      Impression:     No acute pulmonary embolus or other acute finding in the chest.    Moderate diffuse anasarca is present. There are also air-fluid levels present throughout nondistended segments of bowel consistent with diarrheal state. No acute findings in the abdomen or pelvis otherwise.      Electronically Signed: Shahid Ash    4/10/2023 10:00 PM EDT    Workstation ID: [id number]    CT Abdomen Pelvis Without Contrast [[id number]] Collected: 04/10/23 2150     Updated: 04/10/23 2203    Narrative:      CT ABDOMEN PELVIS WO CONTRAST, CT ANGIOGRAM CHEST    Date of Exam: 4/10/2023 9:09 PM EDT    Indication: diarrhea x 2 weeks, elevated LFT's.   The  Comparison: None available.    Technique: Axial CT images were obtained of the abdomen and pelvis without the administration of contrast. Reconstructed coronal and sagittal images were also obtained. Automated exposure control and iterative construction methods were used.    Findings:  CTA chest: There is no pathologic axillary adenopathy or other worrisome body wall soft tissue finding in the chest. No acute findings are present in the partially characterized upper abdomen. There is no pleural or pericardial effusion. There is no   pathologic mediastinal adenopathy. Mildly atherosclerotic, nonaneurysmal thoracic aorta. The pulmonary arteries are well-opacified and without evidence of focal filling defect concerning for acute pulmonary embolus. Evaluation of the osseous structures   demonstrates no evidence of acute fracture or aggressive osseous lesion, with multilevel thoracic spondylosis change present. Evaluation of the lung fields demonstrate some mild scattered atelectasis, otherwise without evidence of acute infectious or   inflammatory process. There are no distinct suspicious pulmonary nodules.    Abdomen and pelvis:  The lung bases are grossly clear. Evaluation of the body wall soft tissues demonstrates moderate diffuse anasarca. The liver, spleen, pancreas and bilateral adrenal glands demonstrate no acute or suspicious findings. The pancreas is   diffusely atrophic. Postoperative changes are present from prior gastric bypass. Small and large bowel loops are nondilated. There is no suspicious focal bowel wall thickening. There is no free fluid or pneumoperitoneum. Atherosclerotic abdominal aorta.   Air-fluid levels are present consistent with provided history of diarrheal state. The pelvic viscera demonstrate no acute findings. The kidneys demonstrate no evidence of stones, hydronephrosis or nephropathy.      Impression:      Impression:     No acute pulmonary embolus or other acute finding in the chest.    Moderate diffuse anasarca is present. There are also air-fluid levels present throughout nondistended segments of bowel consistent with diarrheal state. No acute findings in the abdomen or pelvis otherwise.      Electronically Signed: Shahid Ash    4/10/2023 10:00 PM EDT    Workstation ID: [id number]            Impression:     --left foot/third toe infection with osteomyelitis at the left third distal phalanx, surgery approximately March 29 and bone focus removal per discussion with Dr. Urbina with bone margin free of inflammation per pathology.  Preoperative culture with Enterobacter species, high risk for mixed infection in the setting of prior gangrene.  Empiric antibiotics ongoing for remaining soft tissue infection, adjusted to daptomycin/Azactam.  Possible antibiotic intolerances as outlined below.  Further antibiotic decisions to depend on clinical course/study results and response to therapy.  High risk for further serious morbidity and other serious sequela including persistent/recurrent or nonhealing wounds, persistence of progressive or recurrent infection and risk for further functional/limb loss, higher-level  "amputation etc.    --Diarrhea.  Acute/subacute.  C. Difficile negative.  GI PCR negative.  Abdomen otherwise benign and nontender at present. CT scan reports anasarca but no acute findings in the abdomen aside from changes suggested of \"diarrheal state\".    --abnormal LFTs.  Unclear if related to antimicrobials versus systemic process.  Invanz empirically stopped and monitor    --Acute right upper extremity DVT/SVT.  On anticoagulation per medicine team and PICC line to be removed.    --diabetes.  Glucose control per medicine team    --Peripheral vascular disease.  Extent of disease unclear and follows with Dr. Hendrix    PLAN:      --IV daptomycin/Azactam, while here.  I anticipate transition to oral doxycycline and follow-up with Dr. Donohue on Friday at 11:45 AM in clinic    --Invanz stopped empirically    --Check/review labs cultures and scans    --Discussed with microbiology    --Discussed with pharmacy    --Discussed with Dr. Urbina and with Dr. Vo    --Highly complex at of issues with high risk for further serious morbidity and other serious sequela    **Copied text in this note has been reviewed and is accurate as of 04/13/23.        Dawood George MD  4/13/2023              "

## 2023-04-13 NOTE — CASE MANAGEMENT/SOCIAL WORK
Case Management Discharge Note      Final Note: Patient is being discharged today - he will return home with his spouse and [facility] will continue to follow - resumption of care orders have been faxed to them [phone]. They are aware he will dc home today. Verified with [facility] pharmacy that medications were approved and will be delivered via meds to bed. Family to transport. no other dc needs identified -hadley [phone]         Selected Continued Care - Admitted Since 4/10/2023     Destination    No services have been selected for the patient.              Durable Medical Equipment    No services have been selected for the patient.              Dialysis/Infusion    No services have been selected for the patient.              Home Medical Care     Service Provider Selected Services Address Phone Fax Patient Preferred    [facility] [address] [address] [phone] --          Therapy    No services have been selected for the patient.              Community Resources    No services have been selected for the patient.              Community & DME    No services have been selected for the patient.                       Final Discharge Disposition Code: 06 - home with home health care

## 2023-04-14 ENCOUNTER — TRANSCRIBE ORDERS (OUTPATIENT)
Dept: ADMINISTRATIVE | Facility: HOSPITAL | Age: 63
End: 2023-04-14
Payer: MEDICARE

## 2023-04-14 DIAGNOSIS — I82.621 ACUTE EMBOLISM AND THROMBOSIS OF DEEP VEIN OF RIGHT UPPER EXTREMITY: Primary | ICD-10-CM

## 2023-04-26 ENCOUNTER — HOSPITAL ENCOUNTER (OUTPATIENT)
Dept: CARDIOLOGY | Facility: HOSPITAL | Age: 63
Discharge: HOME | End: 2023-04-26
Payer: MEDICARE

## 2023-04-26 VITALS — HEIGHT: 72 IN | WEIGHT: 249.12 LBS | BODY MASS INDEX: 33.74 KG/M2

## 2023-04-26 DIAGNOSIS — I82.621 ACUTE EMBOLISM AND THROMBOSIS OF DEEP VEIN OF RIGHT UPPER EXTREMITY: ICD-10-CM

## 2023-04-26 LAB
BH CV UPPER VENOUS LEFT SUBCLAVIAN PHASIC: NORMAL
BH CV UPPER VENOUS LEFT SUBCLAVIAN SPONT: NORMAL
BH CV UPPER VENOUS RIGHT AXILLARY COLOR: 1
BH CV UPPER VENOUS RIGHT AXILLARY COMPRESS: NORMAL
BH CV UPPER VENOUS RIGHT AXILLARY PHASIC: NORMAL
BH CV UPPER VENOUS RIGHT AXILLARY SPONT: NORMAL
BH CV UPPER VENOUS RIGHT BASILIC FOREARM COMPRESS: NORMAL
BH CV UPPER VENOUS RIGHT BASILIC UPPER COMPRESS: NORMAL
BH CV UPPER VENOUS RIGHT BRACHIAL COMPRESS: NORMAL
BH CV UPPER VENOUS RIGHT BRACHIAL PHASIC: NORMAL
BH CV UPPER VENOUS RIGHT BRACHIAL SPONT: NORMAL
BH CV UPPER VENOUS RIGHT CEPHALIC FOREARM COMPRESS: NORMAL
BH CV UPPER VENOUS RIGHT CEPHALIC UPPER COMPRESS: NORMAL
BH CV UPPER VENOUS RIGHT RADIAL COMPRESS: NORMAL
BH CV UPPER VENOUS RIGHT SUBCLAVIAN COLOR: 1
BH CV UPPER VENOUS RIGHT SUBCLAVIAN PHASIC: NORMAL
BH CV UPPER VENOUS RIGHT SUBCLAVIAN SPONT: NORMAL
BH CV UPPER VENOUS RIGHT ULNAR COMPRESS: NORMAL
MAXIMAL PREDICTED HEART RATE: 155 BPM
STRESS TARGET HR: 132 BPM

## 2023-04-26 PROCEDURE — 93971 EXTREMITY STUDY: CPT

## 2023-04-28 ENCOUNTER — TRANSCRIBE ORDERS (OUTPATIENT)
Dept: LAB | Facility: HOSPITAL | Age: 63
End: 2023-04-28
Payer: MEDICARE

## 2023-04-28 ENCOUNTER — LAB (OUTPATIENT)
Dept: LAB | Facility: HOSPITAL | Age: 63
End: 2023-04-28
Payer: MEDICARE

## 2023-04-28 DIAGNOSIS — I82.621 DEEP VEIN THROMBOSIS (DVT) OF RIGHT UPPER EXTREMITY, UNSPECIFIED CHRONICITY, UNSPECIFIED VEIN: ICD-10-CM

## 2023-04-28 DIAGNOSIS — R94.5 NONSPECIFIC ABNORMAL RESULTS OF LIVER FUNCTION STUDY: ICD-10-CM

## 2023-04-28 DIAGNOSIS — M86.172 ACUTE OSTEOMYELITIS OF LEFT ANKLE OR FOOT: ICD-10-CM

## 2023-04-28 DIAGNOSIS — M86.172 ACUTE OSTEOMYELITIS OF LEFT ANKLE OR FOOT: Primary | ICD-10-CM

## 2023-04-28 LAB
ALBUMIN SERPL-MCNC: 3 G/DL (ref 3.5–5.2)
ALBUMIN/GLOB SERPL: 1.5 G/DL
ALP SERPL-CCNC: 80 U/L (ref 39–117)
ALT SERPL W P-5'-P-CCNC: 141 U/L (ref 1–41)
ANION GAP SERPL CALCULATED.3IONS-SCNC: 8 MMOL/L (ref 5–15)
AST SERPL-CCNC: 94 U/L (ref 1–40)
BASOPHILS # BLD AUTO: 0.03 10*3/MM3 (ref 0–0.2)
BASOPHILS NFR BLD AUTO: 0.6 % (ref 0–1.5)
BILIRUB SERPL-MCNC: 0.2 MG/DL (ref 0–1.2)
BUN SERPL-MCNC: 19 MG/DL (ref 8–23)
BUN/CREAT SERPL: 20 (ref 7–25)
CALCIUM SPEC-SCNC: 8.2 MG/DL (ref 8.6–10.5)
CHLORIDE SERPL-SCNC: 110 MMOL/L (ref 98–107)
CO2 SERPL-SCNC: 25 MMOL/L (ref 22–29)
CREAT SERPL-MCNC: 0.95 MG/DL (ref 0.76–1.27)
DEPRECATED RDW RBC AUTO: 50.7 FL (ref 37–54)
EGFRCR SERPLBLD CKD-EPI 2021: 88.8 ML/MIN/1.73
EOSINOPHIL # BLD AUTO: 0.13 10*3/MM3 (ref 0–0.4)
EOSINOPHIL NFR BLD AUTO: 2.6 % (ref 0.3–6.2)
ERYTHROCYTE [DISTWIDTH] IN BLOOD BY AUTOMATED COUNT: 15.6 % (ref 12.3–15.4)
GLOBULIN UR ELPH-MCNC: 2 GM/DL
GLUCOSE SERPL-MCNC: 111 MG/DL (ref 65–99)
HCT VFR BLD AUTO: 36.6 % (ref 37.5–51)
HGB BLD-MCNC: 11.5 G/DL (ref 13–17.7)
IMM GRANULOCYTES # BLD AUTO: 0.01 10*3/MM3 (ref 0–0.05)
IMM GRANULOCYTES NFR BLD AUTO: 0.2 % (ref 0–0.5)
LYMPHOCYTES # BLD AUTO: 1.13 10*3/MM3 (ref 0.7–3.1)
LYMPHOCYTES NFR BLD AUTO: 22.3 % (ref 19.6–45.3)
MCH RBC QN AUTO: 27.8 PG (ref 26.6–33)
MCHC RBC AUTO-ENTMCNC: 31.4 G/DL (ref 31.5–35.7)
MCV RBC AUTO: 88.4 FL (ref 79–97)
MONOCYTES # BLD AUTO: 0.46 10*3/MM3 (ref 0.1–0.9)
MONOCYTES NFR BLD AUTO: 9.1 % (ref 5–12)
NEUTROPHILS NFR BLD AUTO: 3.31 10*3/MM3 (ref 1.7–7)
NEUTROPHILS NFR BLD AUTO: 65.2 % (ref 42.7–76)
NRBC BLD AUTO-RTO: 0 /100 WBC (ref 0–0.2)
PLATELET # BLD AUTO: 246 10*3/MM3 (ref 140–450)
PMV BLD AUTO: 10.4 FL (ref 6–12)
POTASSIUM SERPL-SCNC: 3.9 MMOL/L (ref 3.5–5.2)
PROT SERPL-MCNC: 5 G/DL (ref 6–8.5)
RBC # BLD AUTO: 4.14 10*6/MM3 (ref 4.14–5.8)
SODIUM SERPL-SCNC: 143 MMOL/L (ref 136–145)
WBC NRBC COR # BLD: 5.07 10*3/MM3 (ref 3.4–10.8)

## 2023-04-28 PROCEDURE — 85025 COMPLETE CBC W/AUTO DIFF WBC: CPT

## 2023-04-28 PROCEDURE — 80053 COMPREHEN METABOLIC PANEL: CPT

## 2023-04-28 PROCEDURE — 36415 COLL VENOUS BLD VENIPUNCTURE: CPT

## 2023-05-18 NOTE — TELEPHONE ENCOUNTER
PAP is approved. Spoke with pt's wife.    [de-identified] : Pt presents with c/o low back and LLE pain treated with PT. Pt  was injured at work on 11/09/2021 Pt is a nurse technician. She has been experiencing neck pain for 1 week that does not radiate. The pain is not alleviated by any medications that she is currently taking. She is here today to review MRI results of her lumbar spine. \par \par Pt takes muscle relaxant, ibuprofen 800mg Tylenol PRN, these provides mild pain relief. Pt had LESI X3-4, last MARY on 11/2022, this provided 60% pain relief for 2 days, performed by Dr Leavitt,  does not participate with PT at this time. last session 02/2022 and acupuncture , these provided no pain relief\par Pt denies fever, chills, weight changes, loss of bladder control, bowel incontinence or urinary retention or saddle anesthesia. Pt stopped working for 3 months, then went back to work. \par The patient's past medical history, past surgical history, medications, allergies, and social history were reviewed by me today with the patient and documented accordingly. In addition, the patient's family history, which is noncontributory to this visit, was also reviewed.\par \par \par  [FreeTextEntry1] : Bending at waist, sitting, sitting to standing exacerbates pain\par MARY, muscle relaxant, ibuprofen 800mg Tylenol PRN provides mild pain relief

## 2023-05-30 ENCOUNTER — TRANSCRIBE ORDERS (OUTPATIENT)
Dept: ADMINISTRATIVE | Facility: HOSPITAL | Age: 63
End: 2023-05-30

## 2023-05-30 DIAGNOSIS — I82.621 ACUTE EMBOLISM AND THROMBOSIS OF DEEP VEIN OF RIGHT UPPER EXTREMITY: Primary | ICD-10-CM

## 2023-06-12 ENCOUNTER — HOSPITAL ENCOUNTER (OUTPATIENT)
Dept: CARDIOLOGY | Facility: HOSPITAL | Age: 63
Discharge: HOME | End: 2023-06-12
Admitting: INTERNAL MEDICINE
Payer: MEDICARE

## 2023-06-12 VITALS — WEIGHT: 249 LBS | BODY MASS INDEX: 33.72 KG/M2 | HEIGHT: 72 IN

## 2023-06-12 DIAGNOSIS — I82.621 ACUTE EMBOLISM AND THROMBOSIS OF DEEP VEIN OF RIGHT UPPER EXTREMITY: ICD-10-CM

## 2023-06-12 LAB
BH CV UPPER VENOUS LEFT SUBCLAVIAN AUGMENT: NORMAL
BH CV UPPER VENOUS LEFT SUBCLAVIAN COMPRESS: NORMAL
BH CV UPPER VENOUS LEFT SUBCLAVIAN PHASIC: NORMAL
BH CV UPPER VENOUS LEFT SUBCLAVIAN SPONT: NORMAL
BH CV UPPER VENOUS RIGHT AXILLARY AUGMENT: NORMAL
BH CV UPPER VENOUS RIGHT AXILLARY COMPRESS: NORMAL
BH CV UPPER VENOUS RIGHT AXILLARY PHASIC: NORMAL
BH CV UPPER VENOUS RIGHT AXILLARY SPONT: NORMAL
BH CV UPPER VENOUS RIGHT BASILIC FOREARM COMPRESS: NORMAL
BH CV UPPER VENOUS RIGHT BASILIC UPPER COMPRESS: NORMAL
BH CV UPPER VENOUS RIGHT BRACHIAL AUGMENT: NORMAL
BH CV UPPER VENOUS RIGHT BRACHIAL COMPRESS: NORMAL
BH CV UPPER VENOUS RIGHT BRACHIAL PHASIC: NORMAL
BH CV UPPER VENOUS RIGHT BRACHIAL SPONT: NORMAL
BH CV UPPER VENOUS RIGHT CEPHALIC FOREARM COMPRESS: NORMAL
BH CV UPPER VENOUS RIGHT CEPHALIC UPPER COMPRESS: NORMAL
BH CV UPPER VENOUS RIGHT INTERNAL JUGULAR AUGMENT: NORMAL
BH CV UPPER VENOUS RIGHT INTERNAL JUGULAR COMPRESS: NORMAL
BH CV UPPER VENOUS RIGHT INTERNAL JUGULAR PHASIC: NORMAL
BH CV UPPER VENOUS RIGHT INTERNAL JUGULAR SPONT: NORMAL
BH CV UPPER VENOUS RIGHT RADIAL COMPRESS: NORMAL
BH CV UPPER VENOUS RIGHT SUBCLAVIAN AUGMENT: NORMAL
BH CV UPPER VENOUS RIGHT SUBCLAVIAN COMPRESS: NORMAL
BH CV UPPER VENOUS RIGHT SUBCLAVIAN PHASIC: NORMAL
BH CV UPPER VENOUS RIGHT SUBCLAVIAN SPONT: NORMAL
BH CV UPPER VENOUS RIGHT ULNAR COMPRESS: NORMAL
MAXIMAL PREDICTED HEART RATE: 155 BPM
STRESS TARGET HR: 132 BPM

## 2023-06-12 PROCEDURE — 93971 EXTREMITY STUDY: CPT

## 2023-07-17 ENCOUNTER — OFFICE VISIT (OUTPATIENT)
Dept: ENDOCRINOLOGY | Facility: CLINIC | Age: 63
End: 2023-07-17
Payer: MEDICARE

## 2023-07-17 VITALS
DIASTOLIC BLOOD PRESSURE: 72 MMHG | HEIGHT: 72 IN | SYSTOLIC BLOOD PRESSURE: 132 MMHG | OXYGEN SATURATION: 97 % | HEART RATE: 82 BPM | BODY MASS INDEX: 32.78 KG/M2 | WEIGHT: 242 LBS

## 2023-07-17 DIAGNOSIS — E78.2 MIXED HYPERLIPIDEMIA: ICD-10-CM

## 2023-07-17 DIAGNOSIS — E11.8 TYPE 2 DIABETES MELLITUS WITH COMPLICATION, WITHOUT LONG-TERM CURRENT USE OF INSULIN: Primary | ICD-10-CM

## 2023-07-17 LAB
ALBUMIN SERPL-MCNC: 2.9 G/DL (ref 3.5–5.2)
ALBUMIN UR-MCNC: <1.2 MG/DL
ALBUMIN/GLOB SERPL: 1.6 G/DL
ALP SERPL-CCNC: 63 U/L (ref 39–117)
ALT SERPL W P-5'-P-CCNC: 82 U/L (ref 1–41)
ANION GAP SERPL CALCULATED.3IONS-SCNC: 7.2 MMOL/L (ref 5–15)
AST SERPL-CCNC: 52 U/L (ref 1–40)
BILIRUB SERPL-MCNC: 0.3 MG/DL (ref 0–1.2)
BUN SERPL-MCNC: 17 MG/DL (ref 8–23)
BUN/CREAT SERPL: 18.5 (ref 7–25)
CALCIUM SPEC-SCNC: 8.3 MG/DL (ref 8.6–10.5)
CHLORIDE SERPL-SCNC: 108 MMOL/L (ref 98–107)
CHOLEST SERPL-MCNC: 113 MG/DL (ref 0–200)
CO2 SERPL-SCNC: 23.8 MMOL/L (ref 22–29)
CREAT SERPL-MCNC: 0.92 MG/DL (ref 0.76–1.27)
CREAT UR-MCNC: 35.9 MG/DL
DEPRECATED RDW RBC AUTO: 44 FL (ref 37–54)
EGFRCR SERPLBLD CKD-EPI 2021: 92.3 ML/MIN/1.73
ERYTHROCYTE [DISTWIDTH] IN BLOOD BY AUTOMATED COUNT: 13.6 % (ref 12.3–15.4)
EXPIRATION DATE: NORMAL
FERRITIN SERPL-MCNC: 618 NG/ML (ref 30–400)
GLOBULIN UR ELPH-MCNC: 1.8 GM/DL
GLUCOSE BLDC GLUCOMTR-MCNC: 127 MG/DL (ref 70–130)
GLUCOSE SERPL-MCNC: 110 MG/DL (ref 65–99)
HBA1C MFR BLD: 6 %
HCT VFR BLD AUTO: 40.1 % (ref 37.5–51)
HDLC SERPL-MCNC: 64 MG/DL (ref 40–60)
HGB BLD-MCNC: 12.6 G/DL (ref 13–17.7)
IRON 24H UR-MRATE: 68 MCG/DL (ref 59–158)
IRON SATN MFR SERPL: 34 % (ref 20–50)
LDLC SERPL CALC-MCNC: 38 MG/DL (ref 0–100)
LDLC/HDLC SERPL: 0.64 {RATIO}
Lab: NORMAL
MCH RBC QN AUTO: 27.6 PG (ref 26.6–33)
MCHC RBC AUTO-ENTMCNC: 31.4 G/DL (ref 31.5–35.7)
MCV RBC AUTO: 87.7 FL (ref 79–97)
MICROALBUMIN/CREAT UR: NORMAL MG/G{CREAT}
PLATELET # BLD AUTO: 198 10*3/MM3 (ref 140–450)
PMV BLD AUTO: 10.3 FL (ref 6–12)
POTASSIUM SERPL-SCNC: 4.7 MMOL/L (ref 3.5–5.2)
PROT SERPL-MCNC: 4.7 G/DL (ref 6–8.5)
RBC # BLD AUTO: 4.57 10*6/MM3 (ref 4.14–5.8)
SODIUM SERPL-SCNC: 139 MMOL/L (ref 136–145)
TIBC SERPL-MCNC: 203 MCG/DL (ref 298–536)
TRANSFERRIN SERPL-MCNC: 136 MG/DL (ref 200–360)
TRIGL SERPL-MCNC: 40 MG/DL (ref 0–150)
TSH SERPL DL<=0.05 MIU/L-ACNC: 2.3 UIU/ML (ref 0.27–4.2)
VIT B12 BLD-MCNC: >2000 PG/ML (ref 211–946)
VLDLC SERPL-MCNC: 11 MG/DL (ref 5–40)
WBC NRBC COR # BLD: 5.78 10*3/MM3 (ref 3.4–10.8)

## 2023-07-17 PROCEDURE — 36415 COLL VENOUS BLD VENIPUNCTURE: CPT | Performed by: INTERNAL MEDICINE

## 2023-07-17 PROCEDURE — 3044F HG A1C LEVEL LT 7.0%: CPT | Performed by: INTERNAL MEDICINE

## 2023-07-17 PROCEDURE — 82947 ASSAY GLUCOSE BLOOD QUANT: CPT | Performed by: INTERNAL MEDICINE

## 2023-07-17 PROCEDURE — 99214 OFFICE O/P EST MOD 30 MIN: CPT | Performed by: INTERNAL MEDICINE

## 2023-07-17 PROCEDURE — 3075F SYST BP GE 130 - 139MM HG: CPT | Performed by: INTERNAL MEDICINE

## 2023-07-17 PROCEDURE — 3078F DIAST BP <80 MM HG: CPT | Performed by: INTERNAL MEDICINE

## 2023-07-17 PROCEDURE — 83036 HEMOGLOBIN GLYCOSYLATED A1C: CPT | Performed by: INTERNAL MEDICINE

## 2023-07-17 RX ORDER — LANCETS 33 GAUGE
1 EACH MISCELLANEOUS 3 TIMES DAILY
Qty: 100 EACH | Refills: 3 | Status: SHIPPED | OUTPATIENT
Start: 2023-07-17

## 2023-07-17 RX ORDER — LEUCOVORIN/PYRIDOX/MECOBALAMIN 4-50-2 MG
TABLET ORAL
COMMUNITY
Start: 2023-05-12

## 2023-07-17 RX ORDER — ZALEPLON 5 MG/1
5 CAPSULE ORAL
COMMUNITY
Start: 2023-06-29

## 2023-07-17 NOTE — LETTER
July 26, 2023       No Recipients    Patient: Gaetano Marcelo   YOB: 1960   Date of Visit: 7/17/2023     Dear Bryan Stewart, DO:     I had the pleasure of seeing your patient, Gaetano Marcelo.   Below are the relevant portions of my assessment and plan of care.    If you have questions, please do not hesitate to call me.      Sincerely,        Genie Talbot MD        CC:   No Recipients    Genie Talbot MD  07/26/23 2857  Sign when Signing Visit  Chief Complaint   Patient presents with   • Diabetes     Follow up        HPI:   Gaetano Marcelo is a 63 y.o.male who [facility] for f/u evaluation and management of his Type 2 diabetes. Last visit 03/14/2023. His history is as follows:    Interim Events:  - Had a non-healing ulceration on his left 3rd toe. Developed subacute osteomyelitis. S/P angioplasty of the left posterior tibial artery and left plantar artery on 03/27/2023. S/P 3rd toe amputation on 03/28/2023. Was treated with IV Abx throough PICC line.   - In (04/2023), developed PICC line associated Right upper extremity DVT. Noted to have elevated LFTs during admission thought to be drug  induced. On Eliquis now.  - Has lost 60 lbs since 12/2021  - Pt reports he has tolerated his DM medications    1) Type 2 DM with h/o retinopathy, PVD, peripheral neuropathy:   - Diagnosed with diabetes at age 34  - s/p Michelle-en-y gastric in 2004. Required diabetic medications after surgery.   - Pt had been taking metformin for years. In 2013, was hospitalized with sepsis, LAURY due to gangrene of the right 1st, 2nd toes after procedure with podiatry. Pt required dialysis during that admission. Metformin was temporarily decreased during that admission.  FMHx: Parents had DM    Current DM Medications: Pt tolerating DM medications  - Metformin  mg tabs: 1 tab (500 mg) BID with food  - Ozempic: 1.0 mg weekly (getting through [facility] PAP)    Glucometer Review:  No meter for review today    DM  Health Maintenance:  Ophtho: (03/20/2023) with Dr. Dumont ([facility]). H/O retinopathy in 2018, s/p photocoag. Stable.    Podiatry: see below  Monofilament / Foot exam: (09/13/2022)  Lipids: (07/2023) Tchol 113, TG 40, HDL 64, LDL 38 - on atorvastatin 20 mg  Urine Microalb/Cr ratio: (09/2022) normal  TSH: (07/2023) 2.300   CBC: (07/2023) Hgb 12.6 (13 - 17.7)  CMP: (07/2023) Cr 0.92, eGFR 92.5, AST 52, ALT 82  Vit B12: (07/2023) > 2000 - pt is on a Vit B12 supplement  ASA: 81 mg daily, prescribed by another provider    Other History:  1) s/p right 1st, 2nd toe amputation in 2013: was hospitalized with sepsis, LAURY due to gangrene of the Right toes after procedure with podiatry. Pt required dialysis during that admission. 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 Has mild PTSD from that hospitalization. Takes Elavil qhs to help with sleep.   - Had a non-healing ulceration on his left 3rd toe. Developed subacute osteomyelitis. S/P angioplasty of the left posterior tibial artery and left plantar artery on 03/27/2023. S/P 3rd toe amputation on 03/28/2023. Was treated with IV Abx throough PICC line.   - In (04/2023), developed PICC line associated Right upper extremity DVT. NOted to have elevated LFTs during admission thought to be drug  induced.     2) essential HTN: on clonidine 0.1 mg BID, Hydralazine 25 mg BID, Metoprolol  mg daily    3) chronic back pain: on gabapentin 400 mg BID    Review of Systems   Constitutional:  Positive for fatigue (intermittent).        Wt loss   HENT: Negative.  Negative for dental problem.    Eyes: Negative.    Respiratory:  Positive for shortness of breath (with exertion).    Cardiovascular: Negative.    Gastrointestinal: Negative.    Endocrine: Negative.         Denies hypoglycemia   Genitourinary: Negative.    Musculoskeletal:  Positive for arthralgias and back pain.   Skin:  Negative for wound (amputation site C/D/I).   Allergic/Immunologic: Negative.   "  Neurological:  Positive for dizziness (upon standing).   Hematological: Negative.    Psychiatric/Behavioral:  Positive for sleep disturbance.      The following portions of the patient's history were reviewed and updated as appropriate: allergies, current medications, past family history, past medical history, past social history, past surgical history and problem list.      /72   Pulse 82   Ht 182.9 cm (72\")   Wt 110 kg (242 lb)   SpO2 97%   BMI 32.82 kg/m²   Physical Exam  Vitals reviewed.   Constitutional:       General: He is not in acute distress.     Appearance: Normal appearance. He is well-developed. He is not ill-appearing or diaphoretic.      Comments: BMI 32.82   HENT:      Head: Normocephalic.      Mouth/Throat:      Mouth: Mucous membranes are moist.      Pharynx: Oropharynx is clear.   Eyes:      Conjunctiva/sclera: Conjunctivae normal.      Pupils: Pupils are equal, round, and reactive to light.   Neck:      Thyroid: No thyromegaly.      Trachea: No tracheal deviation.      Comments: No palpable thyroid nodules    Cardiovascular:      Rate and Rhythm: Normal rate and regular rhythm.      Heart sounds: Normal heart sounds. No murmur heard.  Pulmonary:      Effort: Pulmonary effort is normal. No respiratory distress.      Breath sounds: Normal breath sounds.   Abdominal:      General: Bowel sounds are normal.      Palpations: Abdomen is soft. There is no mass.      Tenderness: There is no abdominal tenderness.      Comments: No scarring at GLP-1 injection sites     Musculoskeletal:      Cervical back: Neck supple.   Lymphadenopathy:      Cervical: No cervical adenopathy.   Skin:     General: Skin is warm and dry.      Findings: No erythema.   Neurological:      Mental Status: He is alert and oriented to person, place, and time.      Cranial Nerves: No cranial nerve deficit.   Psychiatric:         Behavior: Behavior normal.     LABS/IMAGING: outside records reviewed and summarized in " HPI  Results for orders placed or performed in visit on 07/17/23   CBC (No Diff)    Specimen: Arm, Left; Blood   Result Value Ref Range    WBC 5.78 3.40 - 10.80 10*3/mm3    RBC 4.57 4.14 - 5.80 10*6/mm3    Hemoglobin 12.6 (L) 13.0 - 17.7 g/dL    Hematocrit 40.1 37.5 - 51.0 %    MCV 87.7 79.0 - 97.0 fL    MCH 27.6 26.6 - 33.0 pg    MCHC 31.4 (L) 31.5 - 35.7 g/dL    RDW 13.6 12.3 - 15.4 %    RDW-SD 44.0 37.0 - 54.0 fl    MPV 10.3 6.0 - 12.0 fL    Platelets 198 140 - 450 10*3/mm3   Comprehensive Metabolic Panel    Specimen: Arm, Left; Blood   Result Value Ref Range    Glucose 110 (H) 65 - 99 mg/dL    BUN 17 8 - 23 mg/dL    Creatinine 0.92 0.76 - 1.27 mg/dL    Sodium 139 136 - 145 mmol/L    Potassium 4.7 3.5 - 5.2 mmol/L    Chloride 108 (H) 98 - 107 mmol/L    CO2 23.8 22.0 - 29.0 mmol/L    Calcium 8.3 (L) 8.6 - 10.5 mg/dL    Total Protein 4.7 (L) 6.0 - 8.5 g/dL    Albumin 2.9 (L) 3.5 - 5.2 g/dL    ALT (SGPT) 82 (H) 1 - 41 U/L    AST (SGOT) 52 (H) 1 - 40 U/L    Alkaline Phosphatase 63 39 - 117 U/L    Total Bilirubin 0.3 0.0 - 1.2 mg/dL    Globulin 1.8 gm/dL    A/G Ratio 1.6 g/dL    BUN/Creatinine Ratio 18.5 7.0 - 25.0    Anion Gap 7.2 5.0 - 15.0 mmol/L    eGFR 92.3 >60.0 mL/min/1.73   Lipid Panel    Specimen: Arm, Left; Blood   Result Value Ref Range    Total Cholesterol 113 0 - 200 mg/dL    Triglycerides 40 0 - 150 mg/dL    HDL Cholesterol 64 (H) 40 - 60 mg/dL    LDL Cholesterol  38 0 - 100 mg/dL    VLDL Cholesterol 11 5 - 40 mg/dL    LDL/HDL Ratio 0.64    Microalbumin / Creatinine Urine Ratio - Urine, Clean Catch    Specimen: Urine, Clean Catch   Result Value Ref Range    Microalbumin/Creatinine Ratio      Creatinine, Urine 35.9 mg/dL    Microalbumin, Urine <1.2 mg/dL   TSH    Specimen: Arm, Left; Blood   Result Value Ref Range    TSH 2.300 0.270 - 4.200 uIU/mL   Vitamin B12    Specimen: Arm, Left; Blood   Result Value Ref Range    Vitamin B-12 >2,000 (H) 211 - 946 pg/mL   Iron Profile    Specimen: Arm, Left; Blood    Result Value Ref Range    Iron 68 59 - 158 mcg/dL    Iron Saturation (TSAT) 34 20 - 50 %    Transferrin 136 (L) 200 - 360 mg/dL    TIBC 203 (L) 298 - 536 mcg/dL   Ferritin    Specimen: Arm, Left; Blood   Result Value Ref Range    Ferritin 618.00 (H) 30.00 - 400.00 ng/mL   POC Glycosylated Hemoglobin (Hb A1C)    Specimen: Blood   Result Value Ref Range    Hemoglobin A1C 6.0 %    Lot Number [id number]     Expiration Date 2/19/25    POC Glucose, Blood    Specimen: Blood   Result Value Ref Range    Glucose 127 70 - 130 mg/dL       ASSESSMENT/PLAN:    1) Type 2 DM with h/o retinopathy, PVD, peripheral neuropathy: controlled, Hgb A1C% today is 6.0 in the setting of mild anemia. Denies hypoglycemia. Pt has lost 60 lbs since 12/2021.    - Continue metformin ER to 500 mg BID.  - Continue Ozempic to 1 mg weekly. (getting through [facility] P[facility])    Instructed pt to check blood glucose pre-meal three times a week, varying the meal each check. Example: Mon: pre- BK and pre-lunch, Wed: pre-lunch and predinner, Fri: pre-dinner and bedtime    Advised pt to contact me if he has any GI symptoms or if he develops hypoglycemia (BG < 80)    2) mixed hyperlipidemia: controlled  Lipids: (07/2023) Tchol 113, TG 40, HDL 64, LDL 38 - on atorvastatin 20 mg    DM health maintenance labs completed today and reviewed  Ferritin level elevated likely due to recent infection and other illness  Hgb has improved to 12.6. Iron panel overall okay. No indication for supplementation at this time  LFTs continuing to improve since hospitalization in April       [facility] 5 months    Signed: Genie Talbot MD

## 2023-07-17 NOTE — PROGRESS NOTES
Chief Complaint   Patient presents with    Diabetes     Follow up        HPI:   Gaetano Marcelo is a 63 y.o.male who returns to [facility] for f/u evaluation and management of his Type 2 diabetes. Last visit 03/14/2023. His history is as follows:    Interim Events:  - Had a non-healing ulceration on his left 3rd toe. Developed subacute osteomyelitis. S/P angioplasty of the left posterior tibial artery and left plantar artery on 03/27/2023. S/P 3rd toe amputation on 03/28/2023. Was treated with IV Abx throough PICC line.   - In (04/2023), developed PICC line associated Right upper extremity DVT. Noted to have elevated LFTs during admission thought to be drug  induced. On Eliquis now.  - Has lost 60 lbs since 12/2021  - Pt reports he has tolerated his DM medications    1) Type 2 DM with h/o retinopathy, PVD, peripheral neuropathy:   - Diagnosed with diabetes at age 34  - s/p Michelle-en-y gastric in 2004. Required diabetic medications after surgery.   - Pt had been taking metformin for years. In 2013, was hospitalized with sepsis, LAURY due to gangrene of the right 1st, 2nd toes after procedure with podiatry. Pt required dialysis during that admission. Metformin was temporarily decreased during that admission.  FMHx: Parents had DM    Current DM Medications: Pt tolerating DM medications  - Metformin  mg tabs: 1 tab (500 mg) BID with food  - Ozempic: 1.0 mg weekly (getting through [facility] PAP)    Glucometer Review:  No meter for review today    DM Health Maintenance:  Ophtho: (03/20/2023) with Dr. Dumont ([facility]). H/O retinopathy in 2018, s/p photocoag. Stable.    Podiatry: see below  Monofilament / Foot exam: (09/13/2022)  Lipids: (07/2023) Tchol 113, TG 40, HDL 64, LDL 38 - on atorvastatin 20 mg  Urine Microalb/Cr ratio: (09/2022) normal  TSH: (07/2023) 2.300   CBC: (07/2023) Hgb 12.6 (13 - 17.7)  CMP: (07/2023) Cr 0.92, eGFR 92.5, AST 52, ALT 82  Vit B12: (07/2023) > 2000 - pt is on a Vit B12  "supplement  ASA: 81 mg daily, prescribed by another provider    Other History:  1) s/p right 1st, 2nd toe amputation in 2013: was hospitalized with sepsis, LAURY due to gangrene of the Right toes after procedure with podiatry. Pt required dialysis during that admission. 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 Has mild PTSD from that hospitalization. Takes Elavil qhs to help with sleep.   - Had a non-healing ulceration on his left 3rd toe. Developed subacute osteomyelitis. S/P angioplasty of the left posterior tibial artery and left plantar artery on 03/27/2023. S/P 3rd toe amputation on 03/28/2023. Was treated with IV Abx throough PICC line.   - In (04/2023), developed PICC line associated Right upper extremity DVT. NOted to have elevated LFTs during admission thought to be drug  induced.     2) essential HTN: on clonidine 0.1 mg BID, Hydralazine 25 mg BID, Metoprolol  mg daily    3) chronic back pain: on gabapentin 400 mg BID    Review of Systems   Constitutional:  Positive for fatigue (intermittent).        Wt loss   HENT: Negative.  Negative for dental problem.    Eyes: Negative.    Respiratory:  Positive for shortness of breath (with exertion).    Cardiovascular: Negative.    Gastrointestinal: Negative.    Endocrine: Negative.         Denies hypoglycemia   Genitourinary: Negative.    Musculoskeletal:  Positive for arthralgias and back pain.   Skin:  Negative for wound (amputation site C/D/I).   Allergic/Immunologic: Negative.    Neurological:  Positive for dizziness (upon standing).   Hematological: Negative.    Psychiatric/Behavioral:  Positive for sleep disturbance.      The following portions of the patient's history were reviewed and updated as appropriate: allergies, current medications, past family history, past medical history, past social history, past surgical history and problem list.      /72   Pulse 82   Ht 182.9 cm (72\")   Wt 110 kg (242 lb)   SpO2 97%   BMI " 32.82 kg/m²   Physical Exam  Vitals reviewed.   Constitutional:       General: He is not in acute distress.     Appearance: Normal appearance. He is well-developed. He is not ill-appearing or diaphoretic.      Comments: BMI 32.82   HENT:      Head: Normocephalic.      Mouth/Throat:      Mouth: Mucous membranes are moist.      Pharynx: Oropharynx is clear.   Eyes:      Conjunctiva/sclera: Conjunctivae normal.      Pupils: Pupils are equal, round, and reactive to light.   Neck:      Thyroid: No thyromegaly.      Trachea: No tracheal deviation.      Comments: No palpable thyroid nodules    Cardiovascular:      Rate and Rhythm: Normal rate and regular rhythm.      Heart sounds: Normal heart sounds. No murmur heard.  Pulmonary:      Effort: Pulmonary effort is normal. No respiratory distress.      Breath sounds: Normal breath sounds.   Abdominal:      General: Bowel sounds are normal.      Palpations: Abdomen is soft. There is no mass.      Tenderness: There is no abdominal tenderness.      Comments: No scarring at GLP-1 injection sites     Musculoskeletal:      Cervical back: Neck supple.   Lymphadenopathy:      Cervical: No cervical adenopathy.   Skin:     General: Skin is warm and dry.      Findings: No erythema.   Neurological:      Mental Status: He is alert and oriented to person, place, and time.      Cranial Nerves: No cranial nerve deficit.   Psychiatric:         Behavior: Behavior normal.     LABS/IMAGING: outside records reviewed and summarized in HPI  Results for orders placed or performed in visit on 07/17/23   CBC (No Diff)    Specimen: Arm, Left; Blood   Result Value Ref Range    WBC 5.78 3.40 - 10.80 10*3/mm3    RBC 4.57 4.14 - 5.80 10*6/mm3    Hemoglobin 12.6 (L) 13.0 - 17.7 g/dL    Hematocrit 40.1 37.5 - 51.0 %    MCV 87.7 79.0 - 97.0 fL    MCH 27.6 26.6 - 33.0 pg    MCHC 31.4 (L) 31.5 - 35.7 g/dL    RDW 13.6 12.3 - 15.4 %    RDW-SD 44.0 37.0 - 54.0 fl    MPV 10.3 6.0 - 12.0 fL    Platelets 198 140 -  450 10*3/mm3   Comprehensive Metabolic Panel    Specimen: Arm, Left; Blood   Result Value Ref Range    Glucose 110 (H) 65 - 99 mg/dL    BUN 17 8 - 23 mg/dL    Creatinine 0.92 0.76 - 1.27 mg/dL    Sodium 139 136 - 145 mmol/L    Potassium 4.7 3.5 - 5.2 mmol/L    Chloride 108 (H) 98 - 107 mmol/L    CO2 23.8 22.0 - 29.0 mmol/L    Calcium 8.3 (L) 8.6 - 10.5 mg/dL    Total Protein 4.7 (L) 6.0 - 8.5 g/dL    Albumin 2.9 (L) 3.5 - 5.2 g/dL    ALT (SGPT) 82 (H) 1 - 41 U/L    AST (SGOT) 52 (H) 1 - 40 U/L    Alkaline Phosphatase 63 39 - 117 U/L    Total Bilirubin 0.3 0.0 - 1.2 mg/dL    Globulin 1.8 gm/dL    A/G Ratio 1.6 g/dL    BUN/Creatinine Ratio 18.5 7.0 - 25.0    Anion Gap 7.2 5.0 - 15.0 mmol/L    eGFR 92.3 >60.0 mL/min/1.73   Lipid Panel    Specimen: Arm, Left; Blood   Result Value Ref Range    Total Cholesterol 113 0 - 200 mg/dL    Triglycerides 40 0 - 150 mg/dL    HDL Cholesterol 64 (H) 40 - 60 mg/dL    LDL Cholesterol  38 0 - 100 mg/dL    VLDL Cholesterol 11 5 - 40 mg/dL    LDL/HDL Ratio 0.64    Microalbumin / Creatinine Urine Ratio - Urine, Clean Catch    Specimen: Urine, Clean Catch   Result Value Ref Range    Microalbumin/Creatinine Ratio      Creatinine, Urine 35.9 mg/dL    Microalbumin, Urine <1.2 mg/dL   TSH    Specimen: Arm, Left; Blood   Result Value Ref Range    TSH 2.300 0.270 - 4.200 uIU/mL   Vitamin B12    Specimen: Arm, Left; Blood   Result Value Ref Range    Vitamin B-12 >2,000 (H) 211 - 946 pg/mL   Iron Profile    Specimen: Arm, Left; Blood   Result Value Ref Range    Iron 68 59 - 158 mcg/dL    Iron Saturation (TSAT) 34 20 - 50 %    Transferrin 136 (L) 200 - 360 mg/dL    TIBC 203 (L) 298 - 536 mcg/dL   Ferritin    Specimen: Arm, Left; Blood   Result Value Ref Range    Ferritin 618.00 (H) 30.00 - 400.00 ng/mL   POC Glycosylated Hemoglobin (Hb A1C)    Specimen: Blood   Result Value Ref Range    Hemoglobin A1C 6.0 %    Lot Number [id number]     Expiration Date 2/19/25    POC Glucose, Blood    Specimen:  Blood   Result Value Ref Range    Glucose 127 70 - 130 mg/dL       ASSESSMENT/PLAN:    1) Type 2 DM with h/o retinopathy, PVD, peripheral neuropathy: controlled, Hgb A1C% today is 6.0 in the setting of mild anemia. Denies hypoglycemia. Pt has lost 60 lbs since 12/2021.    - Continue metformin ER to 500 mg BID.  - Continue Ozempic to 1 mg weekly. (getting through [employer] PAP)    Instructed pt to check blood glucose pre-meal three times a week, varying the meal each check. Example: Mon: pre- BK and pre-lunch, Wed: pre-lunch and predinner, Fri: pre-dinner and bedtime    Advised pt to contact me if he has any GI symptoms or if he develops hypoglycemia (BG < 80)    2) mixed hyperlipidemia: controlled  Lipids: (07/2023) Tchol 113, TG 40, HDL 64, LDL 38 - on atorvastatin 20 mg    DM health maintenance labs completed today and reviewed  Ferritin level elevated likely due to recent infection and other illness  Hgb has improved to 12.6. Iron panel overall okay. No indication for supplementation at this time  LFTs continuing to improve since hospitalization in April       [facility] 5 months    Signed: Genie Talbot MD

## 2023-09-12 DIAGNOSIS — E11.8 TYPE 2 DIABETES MELLITUS WITH COMPLICATION, WITHOUT LONG-TERM CURRENT USE OF INSULIN: ICD-10-CM

## 2023-09-12 RX ORDER — METFORMIN HYDROCHLORIDE 500 MG/1
500 TABLET, EXTENDED RELEASE ORAL 2 TIMES DAILY
Qty: 180 TABLET | Refills: 3 | Status: SHIPPED | OUTPATIENT
Start: 2023-09-12

## 2023-10-06 ENCOUNTER — TELEPHONE (OUTPATIENT)
Dept: ENDOCRINOLOGY | Facility: CLINIC | Age: 63
End: 2023-10-06
Payer: MEDICARE

## 2023-10-06 NOTE — TELEPHONE ENCOUNTER
Spoke with patient.  Patient is aware of patient assistance at office.  Patient stated that he would  Ozempic next week.

## 2023-12-19 ENCOUNTER — OFFICE VISIT (OUTPATIENT)
Dept: ENDOCRINOLOGY | Facility: CLINIC | Age: 63
End: 2023-12-19
Payer: MEDICARE

## 2023-12-19 VITALS
WEIGHT: 236 LBS | HEIGHT: 72 IN | SYSTOLIC BLOOD PRESSURE: 130 MMHG | OXYGEN SATURATION: 96 % | DIASTOLIC BLOOD PRESSURE: 68 MMHG | HEART RATE: 81 BPM | BODY MASS INDEX: 31.97 KG/M2

## 2023-12-19 DIAGNOSIS — E11.8 TYPE 2 DIABETES MELLITUS WITH COMPLICATION, WITHOUT LONG-TERM CURRENT USE OF INSULIN: Primary | ICD-10-CM

## 2023-12-19 DIAGNOSIS — G62.9 PERIPHERAL POLYNEUROPATHY: ICD-10-CM

## 2023-12-19 DIAGNOSIS — E78.2 MIXED HYPERLIPIDEMIA: ICD-10-CM

## 2023-12-19 LAB
EXPIRATION DATE: NORMAL
GLUCOSE BLDC GLUCOMTR-MCNC: 123 MG/DL (ref 70–130)
HBA1C MFR BLD: 5.6 % (ref 4.5–5.7)
Lab: NORMAL

## 2023-12-19 RX ORDER — CLOPIDOGREL BISULFATE 75 MG/1
TABLET ORAL
COMMUNITY
Start: 2023-08-01

## 2023-12-19 RX ORDER — SEMAGLUTIDE 1.34 MG/ML
1 INJECTION, SOLUTION SUBCUTANEOUS WEEKLY
Start: 2023-12-19

## 2023-12-19 RX ORDER — METFORMIN HYDROCHLORIDE 500 MG/1
500 TABLET, EXTENDED RELEASE ORAL
Status: SHIPPED
Start: 2023-12-19

## 2023-12-19 RX ORDER — BLOOD SUGAR DIAGNOSTIC
STRIP MISCELLANEOUS
Qty: 100 EACH | Refills: 3 | Status: SHIPPED | OUTPATIENT
Start: 2023-12-19

## 2023-12-19 NOTE — PROGRESS NOTES
Chief Complaint   Patient presents with    Diabetes     Follow up        HPI:   Gaetano Marcelo is a 63 y.o.male who returns to [facility] for f/u evaluation and management of his Type 2 diabetes. Last visit 07/17/2023. His history is as follows:    Interim Events:  - completed labs at [facility] on 09/25/2023 during his bariatric clinic visit, see below  - Reports decreased blood flow in left leg. Followed by vascular surgery. Has slow healing left foot lesion.    - Is noting increased neuropathy type symptoms in her hands.  - Pt reports he has tolerated his DM medications    1) Type 2 DM with h/o retinopathy, PVD, peripheral neuropathy:   - Diagnosed with diabetes at age 34  - s/p Michelle-en-y gastric in 2004. Required diabetic medications after surgery.   - Pt had been taking metformin for years. In 2013, was hospitalized with sepsis, LAURY due to gangrene of the right 1st, 2nd toes after procedure with podiatry. Pt required dialysis during that admission. Metformin was temporarily decreased during that admission.  FMHx: Parents had DM    Current DM Medications: Pt tolerating DM medications  - Metformin  mg tabs: 1 tab (500 mg) BID with food  - Ozempic: 1.0 mg weekly (getting through [employer] PAP). Denies N/V/D or constipation    Glucometer Review:  No meter for review today    DM Health Maintenance:  Ophtho: (03/20/2023) with Dr. Dumont ([facility]). H/O retinopathy in 2018, s/p photocoag. Stable.    Podiatry: see below  Monofilament / Foot exam: (09/13/2022)  Lipids: (09/2023) Tchol 108, TG 55, HDL 62, LDL 33 - on atorvastatin 20 mg, fish oil 1000 mg daily  Urine Microalb/Cr ratio: (09/2022) normal  TSH: (07/2023) 2.300   CBC: (09/2023) Hgb 12.7 (13.7 - 17.5)  CMP: (09/2023) Cr 0.94, eGFR 92.5, LFTs normal, Calcium 8.4, Alb 3.3  Vit B12: (09/2023) > 2000 - pt is on a Vit B12 supplement  ASA: 81 mg daily, prescribed by another provider  PTH (09/2023) 177 (secondary HPTH due to bariatric surgery): is on  ergocalciferol 50,000 IU weekly    Other History:  1) s/p right 1st, 2nd toe amputation in 2013: was hospitalized with sepsis, LAURY due to gangrene of the Right toes after procedure with podiatry. Pt required dialysis during that admission. 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 Has mild PTSD from that hospitalization. Takes Elavil qhs to help with sleep.   - Had a non-healing ulceration on his left 3rd toe. Developed subacute osteomyelitis. S/P angioplasty of the left posterior tibial artery and left plantar artery on 03/27/2023. S/P 3rd toe amputation on 03/28/2023. Was treated with IV Abx throough PICC line.   - In (04/2023), developed PICC line associated Right upper extremity DVT. Noted to have elevated LFTs during admission thought to be drug  induced.     2) essential HTN: on clonidine 0.1 mg BID, Hydralazine 25 mg BID, Metoprolol  mg daily    3) chronic back pain, peripheral neuropathy: on gabapentin 400 mg BID, amitriptyline 25 mg nightly, prescribed by another provider    Review of Systems   Constitutional:  Positive for fatigue (intermittent).        Wt loss   HENT: Negative.  Negative for dental problem.    Eyes: Negative.    Respiratory:  Positive for shortness of breath (with exertion).    Cardiovascular: Negative.    Gastrointestinal: Negative.    Endocrine: Negative.         Denies hypoglycemia   Genitourinary: Negative.    Musculoskeletal:  Positive for arthralgias and back pain.   Skin:  Negative for wound (amputation site C/D/I).   Allergic/Immunologic: Negative.    Neurological:  Positive for numbness (tingling, in hands). Negative for dizziness.   Hematological: Negative.    Psychiatric/Behavioral:  Positive for sleep disturbance.        The following portions of the patient's history were reviewed and updated as appropriate: allergies, current medications, past family history, past medical history, past social history, past surgical history and problem  "list.      /68   Pulse 81   Ht 182.9 cm (72\")   Wt 107 kg (236 lb)   SpO2 96%   BMI 32.01 kg/m²   Physical Exam  Vitals reviewed.   Constitutional:       General: He is not in acute distress.     Appearance: Normal appearance. He is well-developed. He is not ill-appearing or diaphoretic.   HENT:      Head: Normocephalic.      Mouth/Throat:      Mouth: Mucous membranes are moist.      Pharynx: Oropharynx is clear.   Eyes:      Conjunctiva/sclera: Conjunctivae normal.      Pupils: Pupils are equal, round, and reactive to light.   Neck:      Thyroid: No thyromegaly.      Trachea: No tracheal deviation.      Comments: No palpable thyroid nodules    Cardiovascular:      Rate and Rhythm: Normal rate and regular rhythm.      Heart sounds: Normal heart sounds. No murmur heard.  Pulmonary:      Effort: Pulmonary effort is normal. No respiratory distress.      Breath sounds: Normal breath sounds.   Abdominal:      General: Bowel sounds are normal.      Palpations: Abdomen is soft. There is no mass.      Tenderness: There is no abdominal tenderness.      Comments: No scarring at GLP-1 injection sites     Musculoskeletal:      Cervical back: Neck supple.   Lymphadenopathy:      Cervical: No cervical adenopathy.   Skin:     General: Skin is warm and dry.      Findings: Lesion (left lateral heel with cracked skin) present. No erythema.   Neurological:      Mental Status: He is alert and oriented to person, place, and time.      Cranial Nerves: No cranial nerve deficit.   Psychiatric:         Behavior: Behavior normal.       LABS/IMAGING: outside records reviewed and summarized in HPI  Results for orders placed or performed in visit on 12/19/23   POC Glycosylated Hemoglobin (Hb A1C)    Specimen: Blood   Result Value Ref Range    Hemoglobin A1C 5.6 4.5 - 5.7 %    Lot Number [id number]     Expiration Date 7/30/25    POC Glucose, Blood    Specimen: Blood   Result Value Ref Range    Glucose 123 70 - 130 mg/dL "       ASSESSMENT/PLAN:    1) Type 2 DM with h/o retinopathy, PVD, peripheral neuropathy: controlled, Hgb A1C% today is 5.6 in the setting of mild anemia. Denies hypoglycemia.     - Decrease metformin  mg once daily.  - Continue Ozempic to 1 mg weekly. (getting through [employer] PAP)    Instructed pt to check blood glucose pre-meal three times a week, varying the meal each check. Example: Mon: pre- BK and pre-lunch, Wed: pre-lunch and predinner, Fri: pre-dinner and bedtime    Advised pt to contact me if he has any GI symptoms or if he develops hypoglycemia (BG < 80)    2) mixed hyperlipidemia: controlled  Lipids: (09/2023) Tchol 108, TG 55, HDL 62, LDL 33 - on atorvastatin 20 mg, fish oil 1000 mg daily    3) peripheral neuropathy:  Instructed pt to try taking thiamine 100 mg BID for 30 days to see if there is any improvement in his peripheral neuropathy    RTC 6 months    Electronically Signed: Genie Talbot MD

## 2024-04-12 ENCOUNTER — TELEPHONE (OUTPATIENT)
Dept: ENDOCRINOLOGY | Facility: CLINIC | Age: 64
End: 2024-04-12
Payer: MEDICARE

## 2024-04-12 NOTE — TELEPHONE ENCOUNTER
Patients wife Aubrie called. Stated they have not heard from us about filling out patient assistance forms for patients Ozempic rx. She said they have not filled out any forms for this year. They are slightly confused on what next steps they need to take to continue getting patient assistance. Please advise.     Her call back is [phone]

## 2024-04-16 NOTE — TELEPHONE ENCOUNTER
SW wife and advised I would print paperwork and they could come by and complete their part and to bring their updated financials. She voiced understanding and said they would be here Thursday. LAURA   (4) rarely moist

## 2024-04-19 ENCOUNTER — TRANSCRIBE ORDERS (OUTPATIENT)
Dept: LAB | Facility: HOSPITAL | Age: 64
End: 2024-04-19
Payer: MEDICARE

## 2024-04-19 ENCOUNTER — LAB (OUTPATIENT)
Dept: LAB | Facility: HOSPITAL | Age: 64
End: 2024-04-19
Payer: MEDICARE

## 2024-04-19 DIAGNOSIS — K91.1 POSTGASTRIC SURGERY SYNDROMES: Primary | ICD-10-CM

## 2024-04-19 DIAGNOSIS — K46.9 HERNIA OF ABDOMINAL CAVITY: ICD-10-CM

## 2024-04-19 DIAGNOSIS — M86.172 ACUTE OSTEOMYELITIS OF LEFT ANKLE OR FOOT: ICD-10-CM

## 2024-04-19 DIAGNOSIS — K91.1 POSTGASTRIC SURGERY SYNDROMES: ICD-10-CM

## 2024-04-19 LAB
ALBUMIN SERPL-MCNC: 3.6 G/DL (ref 3.5–5.2)
ALBUMIN/GLOB SERPL: 1.5 G/DL
ALP SERPL-CCNC: 70 U/L (ref 39–117)
ALT SERPL W P-5'-P-CCNC: 65 U/L (ref 1–41)
ANION GAP SERPL CALCULATED.3IONS-SCNC: 8 MMOL/L (ref 5–15)
AST SERPL-CCNC: 61 U/L (ref 1–40)
BACTERIA UR QL AUTO: NORMAL /HPF
BASOPHILS # BLD AUTO: 0.04 10*3/MM3 (ref 0–0.2)
BASOPHILS NFR BLD AUTO: 0.9 % (ref 0–1.5)
BILIRUB SERPL-MCNC: 0.3 MG/DL (ref 0–1.2)
BILIRUB UR QL STRIP: NEGATIVE
BUN SERPL-MCNC: 16 MG/DL (ref 8–23)
BUN/CREAT SERPL: 13.9 (ref 7–25)
CALCIUM SPEC-SCNC: 8.3 MG/DL (ref 8.6–10.5)
CHLORIDE SERPL-SCNC: 104 MMOL/L (ref 98–107)
CLARITY UR: CLEAR
CO2 SERPL-SCNC: 25 MMOL/L (ref 22–29)
COLOR UR: YELLOW
CREAT SERPL-MCNC: 1.15 MG/DL (ref 0.76–1.27)
CRP SERPL-MCNC: 0.51 MG/DL (ref 0–0.5)
DEPRECATED RDW RBC AUTO: 47.5 FL (ref 37–54)
EGFRCR SERPLBLD CKD-EPI 2021: 70.2 ML/MIN/1.73
EOSINOPHIL # BLD AUTO: 0.14 10*3/MM3 (ref 0–0.4)
EOSINOPHIL NFR BLD AUTO: 3.2 % (ref 0.3–6.2)
ERYTHROCYTE [DISTWIDTH] IN BLOOD BY AUTOMATED COUNT: 14.6 % (ref 12.3–15.4)
ERYTHROCYTE [SEDIMENTATION RATE] IN BLOOD: 4 MM/HR (ref 0–20)
GLOBULIN UR ELPH-MCNC: 2.4 GM/DL
GLUCOSE SERPL-MCNC: 96 MG/DL (ref 65–99)
GLUCOSE UR STRIP-MCNC: NEGATIVE MG/DL
HCT VFR BLD AUTO: 40.3 % (ref 37.5–51)
HGB BLD-MCNC: 12.6 G/DL (ref 13–17.7)
HGB UR QL STRIP.AUTO: NEGATIVE
HYALINE CASTS UR QL AUTO: NORMAL /LPF
IMM GRANULOCYTES # BLD AUTO: 0 10*3/MM3 (ref 0–0.05)
IMM GRANULOCYTES NFR BLD AUTO: 0 % (ref 0–0.5)
KETONES UR QL STRIP: NEGATIVE
LEUKOCYTE ESTERASE UR QL STRIP.AUTO: NEGATIVE
LYMPHOCYTES # BLD AUTO: 1.23 10*3/MM3 (ref 0.7–3.1)
LYMPHOCYTES NFR BLD AUTO: 28.2 % (ref 19.6–45.3)
MCH RBC QN AUTO: 27.9 PG (ref 26.6–33)
MCHC RBC AUTO-ENTMCNC: 31.3 G/DL (ref 31.5–35.7)
MCV RBC AUTO: 89.4 FL (ref 79–97)
MONOCYTES # BLD AUTO: 0.42 10*3/MM3 (ref 0.1–0.9)
MONOCYTES NFR BLD AUTO: 9.6 % (ref 5–12)
NEUTROPHILS NFR BLD AUTO: 2.53 10*3/MM3 (ref 1.7–7)
NEUTROPHILS NFR BLD AUTO: 58.1 % (ref 42.7–76)
NITRITE UR QL STRIP: NEGATIVE
NRBC BLD AUTO-RTO: 0 /100 WBC (ref 0–0.2)
PH UR STRIP.AUTO: 5.5 [PH] (ref 5–8)
PLATELET # BLD AUTO: 235 10*3/MM3 (ref 140–450)
PMV BLD AUTO: 9.9 FL (ref 6–12)
POTASSIUM SERPL-SCNC: 4.5 MMOL/L (ref 3.5–5.2)
PROT SERPL-MCNC: 6 G/DL (ref 6–8.5)
PROT UR QL STRIP: NEGATIVE
RBC # BLD AUTO: 4.51 10*6/MM3 (ref 4.14–5.8)
RBC # UR STRIP: NORMAL /HPF
REF LAB TEST METHOD: NORMAL
SODIUM SERPL-SCNC: 137 MMOL/L (ref 136–145)
SP GR UR STRIP: 1.02 (ref 1–1.03)
SQUAMOUS #/AREA URNS HPF: NORMAL /HPF
UROBILINOGEN UR QL STRIP: NORMAL
WBC # UR STRIP: NORMAL /HPF
WBC NRBC COR # BLD AUTO: 4.36 10*3/MM3 (ref 3.4–10.8)

## 2024-04-19 PROCEDURE — 85025 COMPLETE CBC W/AUTO DIFF WBC: CPT

## 2024-04-19 PROCEDURE — 86140 C-REACTIVE PROTEIN: CPT

## 2024-04-19 PROCEDURE — 87040 BLOOD CULTURE FOR BACTERIA: CPT

## 2024-04-19 PROCEDURE — 80053 COMPREHEN METABOLIC PANEL: CPT

## 2024-04-19 PROCEDURE — 87086 URINE CULTURE/COLONY COUNT: CPT

## 2024-04-19 PROCEDURE — 36415 COLL VENOUS BLD VENIPUNCTURE: CPT

## 2024-04-19 PROCEDURE — 81001 URINALYSIS AUTO W/SCOPE: CPT

## 2024-04-19 PROCEDURE — 85652 RBC SED RATE AUTOMATED: CPT

## 2024-04-20 LAB — BACTERIA SPEC AEROBE CULT: NO GROWTH

## 2024-04-24 LAB
BACTERIA SPEC AEROBE CULT: NORMAL
BACTERIA SPEC AEROBE CULT: NORMAL

## 2024-05-29 DIAGNOSIS — E11.8 TYPE 2 DIABETES MELLITUS WITH COMPLICATION, WITHOUT LONG-TERM CURRENT USE OF INSULIN: ICD-10-CM

## 2024-05-29 RX ORDER — METFORMIN HYDROCHLORIDE 500 MG/1
500 TABLET, EXTENDED RELEASE ORAL 2 TIMES DAILY
Qty: 180 TABLET | Refills: 3 | Status: SHIPPED | OUTPATIENT
Start: 2024-05-29

## 2024-05-29 NOTE — TELEPHONE ENCOUNTER
Rx Refill Note  Requested Prescriptions     Pending Prescriptions Disp Refills    metFORMIN ER (GLUCOPHAGE-XR) 500 MG 24 hr tablet [Pharmacy Med Name: METFORMIN HCL ER TABS 500MG] 180 tablet 3     Sig: TAKE 1 TABLET TWICE A DAY      Last office visit with prescribing clinician: 12/19/2023      Next office visit with prescribing clinician: 6/25/2024                  Jaycee De Jesus MA  05/29/24, 08:09 EDT

## 2024-06-10 ENCOUNTER — HOSPITAL ENCOUNTER (OUTPATIENT)
Dept: CT IMAGING | Facility: HOSPITAL | Age: 64
Discharge: HOME | End: 2024-06-10
Payer: MEDICARE

## 2024-06-10 ENCOUNTER — TRANSCRIBE ORDERS (OUTPATIENT)
Dept: ADMINISTRATIVE | Facility: HOSPITAL | Age: 64
End: 2024-06-10
Payer: MEDICARE

## 2024-06-10 ENCOUNTER — LAB (OUTPATIENT)
Dept: LAB | Facility: HOSPITAL | Age: 64
End: 2024-06-10
Payer: MEDICARE

## 2024-06-10 ENCOUNTER — TRANSCRIBE ORDERS (OUTPATIENT)
Dept: LAB | Facility: HOSPITAL | Age: 64
End: 2024-06-10
Payer: MEDICARE

## 2024-06-10 DIAGNOSIS — L03.115 CELLULITIS OF RIGHT FOOT: ICD-10-CM

## 2024-06-10 DIAGNOSIS — R10.84 ABDOMINAL PAIN, GENERALIZED: Primary | ICD-10-CM

## 2024-06-10 DIAGNOSIS — L03.115 CELLULITIS OF RIGHT FOOT: Primary | ICD-10-CM

## 2024-06-10 DIAGNOSIS — K46.9 ABDOMINAL HERNIA WITHOUT OBSTRUCTION AND WITHOUT GANGRENE, RECURRENCE NOT SPECIFIED, UNSPECIFIED HERNIA TYPE: ICD-10-CM

## 2024-06-10 DIAGNOSIS — K91.1 DUMPING SYNDROME: ICD-10-CM

## 2024-06-10 DIAGNOSIS — R10.84 ABDOMINAL PAIN, GENERALIZED: ICD-10-CM

## 2024-06-10 LAB
ALBUMIN SERPL-MCNC: 3.6 G/DL (ref 3.5–5.2)
ALBUMIN/GLOB SERPL: 1.7 G/DL
ALP SERPL-CCNC: 69 U/L (ref 39–117)
ALT SERPL W P-5'-P-CCNC: 52 U/L (ref 1–41)
ANION GAP SERPL CALCULATED.3IONS-SCNC: 8 MMOL/L (ref 5–15)
AST SERPL-CCNC: 39 U/L (ref 1–40)
BASOPHILS # BLD AUTO: 0.03 10*3/MM3 (ref 0–0.2)
BASOPHILS NFR BLD AUTO: 0.6 % (ref 0–1.5)
BILIRUB SERPL-MCNC: 0.2 MG/DL (ref 0–1.2)
BUN SERPL-MCNC: 29 MG/DL (ref 8–23)
BUN/CREAT SERPL: 24.2 (ref 7–25)
CALCIUM SPEC-SCNC: 8.5 MG/DL (ref 8.6–10.5)
CHLORIDE SERPL-SCNC: 105 MMOL/L (ref 98–107)
CO2 SERPL-SCNC: 25 MMOL/L (ref 22–29)
CREAT SERPL-MCNC: 1.2 MG/DL (ref 0.76–1.27)
CRP SERPL-MCNC: <0.3 MG/DL (ref 0–0.5)
D DIMER PPP FEU-MCNC: 0.42 MCGFEU/ML (ref 0–0.66)
DEPRECATED RDW RBC AUTO: 47.8 FL (ref 37–54)
EGFRCR SERPLBLD CKD-EPI 2021: 66.7 ML/MIN/1.73
EOSINOPHIL # BLD AUTO: 0.11 10*3/MM3 (ref 0–0.4)
EOSINOPHIL NFR BLD AUTO: 2 % (ref 0.3–6.2)
ERYTHROCYTE [DISTWIDTH] IN BLOOD BY AUTOMATED COUNT: 15 % (ref 12.3–15.4)
ERYTHROCYTE [SEDIMENTATION RATE] IN BLOOD: 3 MM/HR (ref 0–20)
GLOBULIN UR ELPH-MCNC: 2.1 GM/DL
GLUCOSE SERPL-MCNC: 115 MG/DL (ref 65–99)
HCT VFR BLD AUTO: 38.3 % (ref 37.5–51)
HGB BLD-MCNC: 12.1 G/DL (ref 13–17.7)
IMM GRANULOCYTES # BLD AUTO: 0.01 10*3/MM3 (ref 0–0.05)
IMM GRANULOCYTES NFR BLD AUTO: 0.2 % (ref 0–0.5)
LYMPHOCYTES # BLD AUTO: 1.6 10*3/MM3 (ref 0.7–3.1)
LYMPHOCYTES NFR BLD AUTO: 29.4 % (ref 19.6–45.3)
MCH RBC QN AUTO: 27.4 PG (ref 26.6–33)
MCHC RBC AUTO-ENTMCNC: 31.6 G/DL (ref 31.5–35.7)
MCV RBC AUTO: 86.7 FL (ref 79–97)
MONOCYTES # BLD AUTO: 0.55 10*3/MM3 (ref 0.1–0.9)
MONOCYTES NFR BLD AUTO: 10.1 % (ref 5–12)
NEUTROPHILS NFR BLD AUTO: 3.14 10*3/MM3 (ref 1.7–7)
NEUTROPHILS NFR BLD AUTO: 57.7 % (ref 42.7–76)
NRBC BLD AUTO-RTO: 0 /100 WBC (ref 0–0.2)
PLATELET # BLD AUTO: 221 10*3/MM3 (ref 140–450)
PMV BLD AUTO: 9.7 FL (ref 6–12)
POTASSIUM SERPL-SCNC: 4 MMOL/L (ref 3.5–5.2)
PROT SERPL-MCNC: 5.7 G/DL (ref 6–8.5)
RBC # BLD AUTO: 4.42 10*6/MM3 (ref 4.14–5.8)
SODIUM SERPL-SCNC: 138 MMOL/L (ref 136–145)
WBC NRBC COR # BLD AUTO: 5.44 10*3/MM3 (ref 3.4–10.8)

## 2024-06-10 PROCEDURE — 85379 FIBRIN DEGRADATION QUANT: CPT

## 2024-06-10 PROCEDURE — 85025 COMPLETE CBC W/AUTO DIFF WBC: CPT

## 2024-06-10 PROCEDURE — 86140 C-REACTIVE PROTEIN: CPT

## 2024-06-10 PROCEDURE — 85652 RBC SED RATE AUTOMATED: CPT

## 2024-06-10 PROCEDURE — 74176 CT ABD & PELVIS W/O CONTRAST: CPT

## 2024-06-10 PROCEDURE — 80053 COMPREHEN METABOLIC PANEL: CPT

## 2024-06-10 PROCEDURE — 36415 COLL VENOUS BLD VENIPUNCTURE: CPT

## 2024-06-11 ENCOUNTER — TRANSCRIBE ORDERS (OUTPATIENT)
Dept: ADMINISTRATIVE | Facility: HOSPITAL | Age: 64
End: 2024-06-11
Payer: MEDICARE

## 2024-06-11 ENCOUNTER — TELEPHONE (OUTPATIENT)
Dept: ENDOCRINOLOGY | Facility: CLINIC | Age: 64
End: 2024-06-11
Payer: MEDICARE

## 2024-06-11 DIAGNOSIS — M51.36 DEGENERATION OF LUMBAR INTERVERTEBRAL DISC: Primary | ICD-10-CM

## 2024-06-12 ENCOUNTER — HOSPITAL ENCOUNTER (OUTPATIENT)
Dept: MRI IMAGING | Facility: HOSPITAL | Age: 64
Discharge: HOME | End: 2024-06-12
Admitting: INTERNAL MEDICINE
Payer: MEDICARE

## 2024-06-12 DIAGNOSIS — M51.36 DEGENERATION OF LUMBAR INTERVERTEBRAL DISC: ICD-10-CM

## 2024-06-12 PROCEDURE — 72158 MRI LUMBAR SPINE W/O & W/DYE: CPT

## 2024-06-12 PROCEDURE — A9577 INJ MULTIHANCE: HCPCS | Performed by: INTERNAL MEDICINE

## 2024-06-12 PROCEDURE — 0 GADOBENATE DIMEGLUMINE 529 MG/ML SOLUTION: Performed by: INTERNAL MEDICINE

## 2024-06-12 RX ADMIN — GADOBENATE DIMEGLUMINE 20 ML: 529 INJECTION, SOLUTION INTRAVENOUS at 10:19

## 2024-08-02 DIAGNOSIS — E11.8 TYPE 2 DIABETES MELLITUS WITH COMPLICATION, WITHOUT LONG-TERM CURRENT USE OF INSULIN: ICD-10-CM

## 2024-08-02 RX ORDER — SEMAGLUTIDE 1.34 MG/ML
1 INJECTION, SOLUTION SUBCUTANEOUS WEEKLY
Status: CANCELLED
Start: 2024-08-02

## 2024-08-21 ENCOUNTER — OFFICE VISIT (OUTPATIENT)
Dept: ENDOCRINOLOGY | Facility: CLINIC | Age: 64
End: 2024-08-21
Payer: MEDICARE

## 2024-08-21 VITALS
HEIGHT: 72 IN | SYSTOLIC BLOOD PRESSURE: 128 MMHG | HEART RATE: 60 BPM | WEIGHT: 239.4 LBS | OXYGEN SATURATION: 100 % | DIASTOLIC BLOOD PRESSURE: 78 MMHG | BODY MASS INDEX: 32.43 KG/M2

## 2024-08-21 DIAGNOSIS — E11.8 TYPE 2 DIABETES MELLITUS WITH COMPLICATION, WITHOUT LONG-TERM CURRENT USE OF INSULIN: Primary | ICD-10-CM

## 2024-08-21 LAB
DEPRECATED RDW RBC AUTO: 42.3 FL (ref 37–54)
ERYTHROCYTE [DISTWIDTH] IN BLOOD BY AUTOMATED COUNT: 13.4 % (ref 12.3–15.4)
EXPIRATION DATE: ABNORMAL
EXPIRATION DATE: NORMAL
GLUCOSE BLDC GLUCOMTR-MCNC: 125 MG/DL (ref 70–130)
HBA1C MFR BLD: 6 % (ref 4.5–5.7)
HCT VFR BLD AUTO: 38 % (ref 37.5–51)
HGB BLD-MCNC: 12.4 G/DL (ref 13–17.7)
Lab: ABNORMAL
Lab: NORMAL
MCH RBC QN AUTO: 28.5 PG (ref 26.6–33)
MCHC RBC AUTO-ENTMCNC: 32.6 G/DL (ref 31.5–35.7)
MCV RBC AUTO: 87.4 FL (ref 79–97)
PLATELET # BLD AUTO: 277 10*3/MM3 (ref 140–450)
PMV BLD AUTO: 9.5 FL (ref 6–12)
RBC # BLD AUTO: 4.35 10*6/MM3 (ref 4.14–5.8)
WBC NRBC COR # BLD AUTO: 4.47 10*3/MM3 (ref 3.4–10.8)

## 2024-08-21 PROCEDURE — 80061 LIPID PANEL: CPT | Performed by: INTERNAL MEDICINE

## 2024-08-21 PROCEDURE — 82607 VITAMIN B-12: CPT | Performed by: INTERNAL MEDICINE

## 2024-08-21 PROCEDURE — 82043 UR ALBUMIN QUANTITATIVE: CPT | Performed by: INTERNAL MEDICINE

## 2024-08-21 PROCEDURE — 84443 ASSAY THYROID STIM HORMONE: CPT | Performed by: INTERNAL MEDICINE

## 2024-08-21 PROCEDURE — 82947 ASSAY GLUCOSE BLOOD QUANT: CPT | Performed by: INTERNAL MEDICINE

## 2024-08-21 PROCEDURE — 83036 HEMOGLOBIN GLYCOSYLATED A1C: CPT | Performed by: INTERNAL MEDICINE

## 2024-08-21 PROCEDURE — 80053 COMPREHEN METABOLIC PANEL: CPT | Performed by: INTERNAL MEDICINE

## 2024-08-21 PROCEDURE — 99213 OFFICE O/P EST LOW 20 MIN: CPT | Performed by: INTERNAL MEDICINE

## 2024-08-21 PROCEDURE — 82570 ASSAY OF URINE CREATININE: CPT | Performed by: INTERNAL MEDICINE

## 2024-08-21 PROCEDURE — 36415 COLL VENOUS BLD VENIPUNCTURE: CPT | Performed by: INTERNAL MEDICINE

## 2024-08-21 PROCEDURE — 3074F SYST BP LT 130 MM HG: CPT | Performed by: INTERNAL MEDICINE

## 2024-08-21 PROCEDURE — 3078F DIAST BP <80 MM HG: CPT | Performed by: INTERNAL MEDICINE

## 2024-08-21 PROCEDURE — 85027 COMPLETE CBC AUTOMATED: CPT | Performed by: INTERNAL MEDICINE

## 2024-08-21 PROCEDURE — 3044F HG A1C LEVEL LT 7.0%: CPT | Performed by: INTERNAL MEDICINE

## 2024-08-21 RX ORDER — ACETAMINOPHEN 325 MG/1
325 TABLET ORAL EVERY 4 HOURS PRN
COMMUNITY

## 2024-08-21 RX ORDER — LANCETS 33 GAUGE
1 EACH MISCELLANEOUS 3 TIMES DAILY
Qty: 100 EACH | Refills: 3 | Status: SHIPPED | OUTPATIENT
Start: 2024-08-21

## 2024-08-21 RX ORDER — OXYCODONE HYDROCHLORIDE 5 MG/1
1 TABLET ORAL EVERY 12 HOURS SCHEDULED
COMMUNITY
Start: 2024-07-02

## 2024-08-22 LAB
ALBUMIN SERPL-MCNC: 3.6 G/DL (ref 3.5–5.2)
ALBUMIN UR-MCNC: <1.2 MG/DL
ALBUMIN/GLOB SERPL: 1.5 G/DL
ALP SERPL-CCNC: 86 U/L (ref 39–117)
ALT SERPL W P-5'-P-CCNC: 49 U/L (ref 1–41)
ANION GAP SERPL CALCULATED.3IONS-SCNC: 8 MMOL/L (ref 5–15)
AST SERPL-CCNC: 45 U/L (ref 1–40)
BILIRUB SERPL-MCNC: 0.2 MG/DL (ref 0–1.2)
BUN SERPL-MCNC: 21 MG/DL (ref 8–23)
BUN/CREAT SERPL: 20 (ref 7–25)
CALCIUM SPEC-SCNC: 8.8 MG/DL (ref 8.6–10.5)
CHLORIDE SERPL-SCNC: 102 MMOL/L (ref 98–107)
CHOLEST SERPL-MCNC: 139 MG/DL (ref 0–200)
CO2 SERPL-SCNC: 25 MMOL/L (ref 22–29)
CREAT SERPL-MCNC: 1.05 MG/DL (ref 0.76–1.27)
CREAT UR-MCNC: 49.5 MG/DL
EGFRCR SERPLBLD CKD-EPI 2021: 78.3 ML/MIN/1.73
GLOBULIN UR ELPH-MCNC: 2.4 GM/DL
GLUCOSE SERPL-MCNC: 81 MG/DL (ref 65–99)
HDLC SERPL-MCNC: 71 MG/DL (ref 40–60)
LDLC SERPL CALC-MCNC: 56 MG/DL (ref 0–100)
LDLC/HDLC SERPL: 0.81 {RATIO}
MICROALBUMIN/CREAT UR: NORMAL MG/G{CREAT}
POTASSIUM SERPL-SCNC: 4.5 MMOL/L (ref 3.5–5.2)
PROT SERPL-MCNC: 6 G/DL (ref 6–8.5)
SODIUM SERPL-SCNC: 135 MMOL/L (ref 136–145)
TRIGL SERPL-MCNC: 54 MG/DL (ref 0–150)
TSH SERPL DL<=0.05 MIU/L-ACNC: 3.8 UIU/ML (ref 0.27–4.2)
VIT B12 BLD-MCNC: >2000 PG/ML (ref 211–946)
VLDLC SERPL-MCNC: 12 MG/DL (ref 5–40)

## 2024-10-15 ENCOUNTER — TELEPHONE (OUTPATIENT)
Dept: ENDOCRINOLOGY | Facility: CLINIC | Age: 64
End: 2024-10-15
Payer: MEDICARE

## 2024-10-15 NOTE — TELEPHONE ENCOUNTER
PATIENTS WIFE WOULD LIKE A CALL BACK ABOUT THE STATUS OF PATIENT ASSISTANCE PAPER WORK THAT THEY FILLED OUT LAST FRIDAY. THIS IS TO RENEW PATIENTS OZEMPIC MEDICATION.

## 2024-10-25 ENCOUNTER — TELEPHONE (OUTPATIENT)
Dept: ENDOCRINOLOGY | Facility: CLINIC | Age: 64
End: 2024-10-25
Payer: MEDICARE

## 2024-12-09 ENCOUNTER — TELEPHONE (OUTPATIENT)
Dept: ENDOCRINOLOGY | Facility: CLINIC | Age: 64
End: 2024-12-09
Payer: MEDICARE

## 2024-12-09 DIAGNOSIS — E11.8 TYPE 2 DIABETES MELLITUS WITH COMPLICATION, WITHOUT LONG-TERM CURRENT USE OF INSULIN: ICD-10-CM

## 2024-12-09 RX ORDER — LANCETS 33 GAUGE
1 EACH MISCELLANEOUS 3 TIMES DAILY
Qty: 100 EACH | Refills: 2 | Status: SHIPPED | OUTPATIENT
Start: 2024-12-09 | End: 2024-12-12 | Stop reason: SDUPTHER

## 2024-12-09 NOTE — TELEPHONE ENCOUNTER
PT CALLED REQUESTING Rxs FOR ONE TOUCH VERIO TEST STRIPS, LANCETS, AND A NEW TEST KIT TO BE SENT IN TO [facility] IN [place], [place].

## 2024-12-12 RX ORDER — LANCETS 33 GAUGE
1 EACH MISCELLANEOUS 3 TIMES DAILY
Qty: 100 EACH | Refills: 2 | Status: SHIPPED | OUTPATIENT
Start: 2024-12-12

## 2025-03-11 ENCOUNTER — TELEPHONE (OUTPATIENT)
Dept: ENDOCRINOLOGY | Facility: CLINIC | Age: 65
End: 2025-03-11
Payer: MEDICARE

## 2025-03-11 NOTE — TELEPHONE ENCOUNTER
Called and spoke to the wife. She requested PAP forms be mailed. She will bring them back to the office next week.    Forms printed and mailed.

## 2025-03-11 NOTE — TELEPHONE ENCOUNTER
PT'S WIFE CALLED STATING SHE CONTACTED [employer] AND THIS PT NEED A NEW APPLICATION SENT IN FOR 2025.

## 2025-03-19 ENCOUNTER — HOSPITAL ENCOUNTER (INPATIENT)
Facility: HOSPITAL | Age: 65
LOS: 4 days | Discharge: HOME | DRG: 351 | End: 2025-03-24
Attending: EMERGENCY MEDICINE | Admitting: INTERNAL MEDICINE
Payer: MEDICARE

## 2025-03-19 DIAGNOSIS — F32.A DEPRESSIVE DISORDER: ICD-10-CM

## 2025-03-19 DIAGNOSIS — F51.01 PRIMARY INSOMNIA: ICD-10-CM

## 2025-03-19 DIAGNOSIS — K56.609 SBO (SMALL BOWEL OBSTRUCTION): ICD-10-CM

## 2025-03-19 DIAGNOSIS — E66.01 MORBID OBESITY: ICD-10-CM

## 2025-03-19 DIAGNOSIS — K21.9 GASTROESOPHAGEAL REFLUX DISEASE WITHOUT ESOPHAGITIS: ICD-10-CM

## 2025-03-19 DIAGNOSIS — K40.90 RIGHT INGUINAL HERNIA: ICD-10-CM

## 2025-03-19 DIAGNOSIS — G89.4 CHRONIC PAIN DISORDER: ICD-10-CM

## 2025-03-19 DIAGNOSIS — K40.30 INCARCERATED RIGHT INGUINAL HERNIA: Primary | ICD-10-CM

## 2025-03-19 DIAGNOSIS — E11.8 TYPE 2 DIABETES MELLITUS WITH COMPLICATION, WITHOUT LONG-TERM CURRENT USE OF INSULIN: ICD-10-CM

## 2025-03-19 DIAGNOSIS — R10.31 RIGHT LOWER QUADRANT ABDOMINAL PAIN: ICD-10-CM

## 2025-03-19 LAB
ALBUMIN SERPL-MCNC: 3.5 G/DL (ref 3.5–5.2)
ALBUMIN/GLOB SERPL: 1.7 G/DL
ALP SERPL-CCNC: 70 U/L (ref 39–117)
ALT SERPL W P-5'-P-CCNC: 50 U/L (ref 1–41)
ANION GAP SERPL CALCULATED.3IONS-SCNC: 11 MMOL/L (ref 5–15)
AST SERPL-CCNC: 68 U/L (ref 1–40)
BASOPHILS # BLD AUTO: 0.04 10*3/MM3 (ref 0–0.2)
BASOPHILS NFR BLD AUTO: 0.8 % (ref 0–1.5)
BILIRUB SERPL-MCNC: 0.2 MG/DL (ref 0–1.2)
BUN SERPL-MCNC: 17 MG/DL (ref 8–23)
BUN/CREAT SERPL: 18.5 (ref 7–25)
CALCIUM SPEC-SCNC: 8.3 MG/DL (ref 8.6–10.5)
CHLORIDE SERPL-SCNC: 103 MMOL/L (ref 98–107)
CO2 SERPL-SCNC: 23 MMOL/L (ref 22–29)
CREAT SERPL-MCNC: 0.92 MG/DL (ref 0.76–1.27)
D-LACTATE SERPL-SCNC: 1.2 MMOL/L (ref 0.5–2)
DEPRECATED RDW RBC AUTO: 47.5 FL (ref 37–54)
EGFRCR SERPLBLD CKD-EPI 2021: 91.7 ML/MIN/1.73
EOSINOPHIL # BLD AUTO: 0.11 10*3/MM3 (ref 0–0.4)
EOSINOPHIL NFR BLD AUTO: 2.2 % (ref 0.3–6.2)
ERYTHROCYTE [DISTWIDTH] IN BLOOD BY AUTOMATED COUNT: 14.6 % (ref 12.3–15.4)
GLOBULIN UR ELPH-MCNC: 2.1 GM/DL
GLUCOSE SERPL-MCNC: 120 MG/DL (ref 65–99)
HCT VFR BLD AUTO: 40.1 % (ref 37.5–51)
HGB BLD-MCNC: 12.7 G/DL (ref 13–17.7)
HOLD SPECIMEN: NORMAL
IMM GRANULOCYTES # BLD AUTO: 0.01 10*3/MM3 (ref 0–0.05)
IMM GRANULOCYTES NFR BLD AUTO: 0.2 % (ref 0–0.5)
LIPASE SERPL-CCNC: 11 U/L (ref 13–60)
LYMPHOCYTES # BLD AUTO: 1.5 10*3/MM3 (ref 0.7–3.1)
LYMPHOCYTES NFR BLD AUTO: 30.6 % (ref 19.6–45.3)
MAGNESIUM SERPL-MCNC: 1.6 MG/DL (ref 1.6–2.4)
MCH RBC QN AUTO: 28 PG (ref 26.6–33)
MCHC RBC AUTO-ENTMCNC: 31.7 G/DL (ref 31.5–35.7)
MCV RBC AUTO: 88.5 FL (ref 79–97)
MONOCYTES # BLD AUTO: 0.45 10*3/MM3 (ref 0.1–0.9)
MONOCYTES NFR BLD AUTO: 9.2 % (ref 5–12)
NEUTROPHILS NFR BLD AUTO: 2.79 10*3/MM3 (ref 1.7–7)
NEUTROPHILS NFR BLD AUTO: 57 % (ref 42.7–76)
NRBC BLD AUTO-RTO: 0 /100 WBC (ref 0–0.2)
PLATELET # BLD AUTO: 250 10*3/MM3 (ref 140–450)
PMV BLD AUTO: 9.5 FL (ref 6–12)
POTASSIUM SERPL-SCNC: 5 MMOL/L (ref 3.5–5.2)
PROT SERPL-MCNC: 5.6 G/DL (ref 6–8.5)
RBC # BLD AUTO: 4.53 10*6/MM3 (ref 4.14–5.8)
SODIUM SERPL-SCNC: 137 MMOL/L (ref 136–145)
WBC NRBC COR # BLD AUTO: 4.9 10*3/MM3 (ref 3.4–10.8)
WHOLE BLOOD HOLD COAG: NORMAL
WHOLE BLOOD HOLD SPECIMEN: NORMAL

## 2025-03-19 PROCEDURE — 25010000002 HYDROMORPHONE 1 MG/ML SOLUTION: Performed by: EMERGENCY MEDICINE

## 2025-03-19 PROCEDURE — 25010000002 FENTANYL CITRATE (PF) 50 MCG/ML SOLUTION: Performed by: EMERGENCY MEDICINE

## 2025-03-19 PROCEDURE — 25010000002 ONDANSETRON PER 1 MG: Performed by: EMERGENCY MEDICINE

## 2025-03-19 PROCEDURE — 83605 ASSAY OF LACTIC ACID: CPT | Performed by: EMERGENCY MEDICINE

## 2025-03-19 PROCEDURE — 25010000002 PROPOFOL 10 MG/ML EMULSION: Performed by: EMERGENCY MEDICINE

## 2025-03-19 PROCEDURE — 83735 ASSAY OF MAGNESIUM: CPT | Performed by: EMERGENCY MEDICINE

## 2025-03-19 PROCEDURE — 83036 HEMOGLOBIN GLYCOSYLATED A1C: CPT | Performed by: PHYSICIAN ASSISTANT

## 2025-03-19 PROCEDURE — 85025 COMPLETE CBC W/AUTO DIFF WBC: CPT | Performed by: EMERGENCY MEDICINE

## 2025-03-19 PROCEDURE — 25810000003 SODIUM CHLORIDE 0.9 % SOLUTION: Performed by: EMERGENCY MEDICINE

## 2025-03-19 PROCEDURE — 36415 COLL VENOUS BLD VENIPUNCTURE: CPT

## 2025-03-19 PROCEDURE — 25010000002 MIDAZOLAM PER 1 MG: Performed by: EMERGENCY MEDICINE

## 2025-03-19 PROCEDURE — 99285 EMERGENCY DEPT VISIT HI MDM: CPT

## 2025-03-19 PROCEDURE — 83690 ASSAY OF LIPASE: CPT | Performed by: EMERGENCY MEDICINE

## 2025-03-19 PROCEDURE — 80053 COMPREHEN METABOLIC PANEL: CPT | Performed by: EMERGENCY MEDICINE

## 2025-03-19 RX ORDER — PROPOFOL 10 MG/ML
100 VIAL (ML) INTRAVENOUS ONCE
Status: COMPLETED | OUTPATIENT
Start: 2025-03-19 | End: 2025-03-19

## 2025-03-19 RX ORDER — ONDANSETRON 2 MG/ML
4 INJECTION INTRAMUSCULAR; INTRAVENOUS ONCE
Status: COMPLETED | OUTPATIENT
Start: 2025-03-19 | End: 2025-03-19

## 2025-03-19 RX ORDER — SODIUM CHLORIDE 0.9 % (FLUSH) 0.9 %
10 SYRINGE (ML) INJECTION AS NEEDED
Status: DISCONTINUED | OUTPATIENT
Start: 2025-03-19 | End: 2025-03-21

## 2025-03-19 RX ORDER — OXYCODONE AND ACETAMINOPHEN 5; 325 MG/1; MG/1
1 TABLET ORAL ONCE
Refills: 0 | Status: COMPLETED | OUTPATIENT
Start: 2025-03-19 | End: 2025-03-19

## 2025-03-19 RX ORDER — SODIUM CHLORIDE 9 MG/ML
100 INJECTION, SOLUTION INTRAVENOUS CONTINUOUS
Status: ACTIVE | OUTPATIENT
Start: 2025-03-19 | End: 2025-03-20

## 2025-03-19 RX ORDER — MIDAZOLAM HYDROCHLORIDE 1 MG/ML
1 INJECTION, SOLUTION INTRAMUSCULAR; INTRAVENOUS ONCE
Status: COMPLETED | OUTPATIENT
Start: 2025-03-19 | End: 2025-03-19

## 2025-03-19 RX ORDER — FENTANYL CITRATE 50 UG/ML
50 INJECTION, SOLUTION INTRAMUSCULAR; INTRAVENOUS ONCE
Refills: 0 | Status: COMPLETED | OUTPATIENT
Start: 2025-03-19 | End: 2025-03-19

## 2025-03-19 RX ADMIN — MIDAZOLAM HYDROCHLORIDE 1 MG: 1 INJECTION, SOLUTION INTRAMUSCULAR; INTRAVENOUS at 22:48

## 2025-03-19 RX ADMIN — HYDROMORPHONE HYDROCHLORIDE 1 MG: 1 INJECTION, SOLUTION INTRAMUSCULAR; INTRAVENOUS; SUBCUTANEOUS at 21:38

## 2025-03-19 RX ADMIN — PROPOFOL 50 MG: 10 INJECTION, EMULSION INTRAVENOUS at 22:49

## 2025-03-19 RX ADMIN — SODIUM CHLORIDE 100 ML/HR: 9 INJECTION, SOLUTION INTRAVENOUS at 22:48

## 2025-03-19 RX ADMIN — FENTANYL CITRATE 50 MCG: 50 INJECTION, SOLUTION INTRAMUSCULAR; INTRAVENOUS at 22:43

## 2025-03-19 RX ADMIN — OXYCODONE HYDROCHLORIDE AND ACETAMINOPHEN 1 TABLET: 5; 325 TABLET ORAL at 21:39

## 2025-03-19 RX ADMIN — ONDANSETRON 4 MG: 2 INJECTION INTRAMUSCULAR; INTRAVENOUS at 21:38

## 2025-03-20 PROBLEM — D64.9 ANEMIA: Status: ACTIVE | Noted: 2025-03-20

## 2025-03-20 PROBLEM — K56.609 SBO (SMALL BOWEL OBSTRUCTION): Status: ACTIVE | Noted: 2025-03-20

## 2025-03-20 PROBLEM — L97.509 DIABETIC FOOT ULCER: Status: ACTIVE | Noted: 2025-03-20

## 2025-03-20 PROBLEM — Z86.718 HISTORY OF DVT (DEEP VEIN THROMBOSIS): Status: ACTIVE | Noted: 2023-04-10

## 2025-03-20 PROBLEM — E11.621 DIABETIC FOOT ULCER: Status: ACTIVE | Noted: 2025-03-20

## 2025-03-20 LAB
ALBUMIN SERPL-MCNC: 3.1 G/DL (ref 3.5–5.2)
ALBUMIN/GLOB SERPL: 1.7 G/DL
ALP SERPL-CCNC: 65 U/L (ref 39–117)
ALT SERPL W P-5'-P-CCNC: 40 U/L (ref 1–41)
ANION GAP SERPL CALCULATED.3IONS-SCNC: 8 MMOL/L (ref 5–15)
AST SERPL-CCNC: 45 U/L (ref 1–40)
BASOPHILS # BLD AUTO: 0.03 10*3/MM3 (ref 0–0.2)
BASOPHILS NFR BLD AUTO: 0.7 % (ref 0–1.5)
BILIRUB SERPL-MCNC: 0.3 MG/DL (ref 0–1.2)
BUN SERPL-MCNC: 14 MG/DL (ref 8–23)
BUN/CREAT SERPL: 21.9 (ref 7–25)
CALCIUM SPEC-SCNC: 8.1 MG/DL (ref 8.6–10.5)
CHLORIDE SERPL-SCNC: 106 MMOL/L (ref 98–107)
CO2 SERPL-SCNC: 24 MMOL/L (ref 22–29)
CREAT SERPL-MCNC: 0.64 MG/DL (ref 0.76–1.27)
DEPRECATED RDW RBC AUTO: 48 FL (ref 37–54)
EGFRCR SERPLBLD CKD-EPI 2021: 104.4 ML/MIN/1.73
EOSINOPHIL # BLD AUTO: 0.14 10*3/MM3 (ref 0–0.4)
EOSINOPHIL NFR BLD AUTO: 3.2 % (ref 0.3–6.2)
ERYTHROCYTE [DISTWIDTH] IN BLOOD BY AUTOMATED COUNT: 14.8 % (ref 12.3–15.4)
GLOBULIN UR ELPH-MCNC: 1.8 GM/DL
GLUCOSE BLDC GLUCOMTR-MCNC: 134 MG/DL (ref 70–130)
GLUCOSE BLDC GLUCOMTR-MCNC: 71 MG/DL (ref 70–130)
GLUCOSE BLDC GLUCOMTR-MCNC: 81 MG/DL (ref 70–130)
GLUCOSE BLDC GLUCOMTR-MCNC: 87 MG/DL (ref 70–130)
GLUCOSE SERPL-MCNC: 89 MG/DL (ref 65–99)
HBA1C MFR BLD: 5.6 % (ref 4.8–5.6)
HCT VFR BLD AUTO: 36.8 % (ref 37.5–51)
HGB BLD-MCNC: 11.6 G/DL (ref 13–17.7)
IMM GRANULOCYTES # BLD AUTO: 0.01 10*3/MM3 (ref 0–0.05)
IMM GRANULOCYTES NFR BLD AUTO: 0.2 % (ref 0–0.5)
LYMPHOCYTES # BLD AUTO: 1.43 10*3/MM3 (ref 0.7–3.1)
LYMPHOCYTES NFR BLD AUTO: 32.6 % (ref 19.6–45.3)
MCH RBC QN AUTO: 27.8 PG (ref 26.6–33)
MCHC RBC AUTO-ENTMCNC: 31.5 G/DL (ref 31.5–35.7)
MCV RBC AUTO: 88.2 FL (ref 79–97)
MONOCYTES # BLD AUTO: 0.42 10*3/MM3 (ref 0.1–0.9)
MONOCYTES NFR BLD AUTO: 9.6 % (ref 5–12)
NEUTROPHILS NFR BLD AUTO: 2.36 10*3/MM3 (ref 1.7–7)
NEUTROPHILS NFR BLD AUTO: 53.7 % (ref 42.7–76)
NRBC BLD AUTO-RTO: 0 /100 WBC (ref 0–0.2)
PLATELET # BLD AUTO: 207 10*3/MM3 (ref 140–450)
PMV BLD AUTO: 9.5 FL (ref 6–12)
POTASSIUM SERPL-SCNC: 4.5 MMOL/L (ref 3.5–5.2)
PROT SERPL-MCNC: 4.9 G/DL (ref 6–8.5)
RBC # BLD AUTO: 4.17 10*6/MM3 (ref 4.14–5.8)
SODIUM SERPL-SCNC: 138 MMOL/L (ref 136–145)
WBC NRBC COR # BLD AUTO: 4.39 10*3/MM3 (ref 3.4–10.8)

## 2025-03-20 PROCEDURE — 25010000002 PROTHROMBIN COMPLEX CONC HUMAN 1000 UNITS KIT: Performed by: SURGERY

## 2025-03-20 PROCEDURE — 25810000003 SODIUM CHLORIDE 0.9 % SOLUTION: Performed by: INTERNAL MEDICINE

## 2025-03-20 PROCEDURE — 80053 COMPREHEN METABOLIC PANEL: CPT | Performed by: PHYSICIAN ASSISTANT

## 2025-03-20 PROCEDURE — 30283B1 TRANSFUSION OF NONAUTOLOGOUS 4-FACTOR PROTHROMBIN COMPLEX CONCENTRATE INTO VEIN, PERCUTANEOUS APPROACH: ICD-10-PCS | Performed by: SURGERY

## 2025-03-20 PROCEDURE — 25010000002 PROTHROMBIN COMPLEX CONC HUMAN 500 UNITS KIT: Performed by: SURGERY

## 2025-03-20 PROCEDURE — 25010000002 HYDROMORPHONE PER 4 MG: Performed by: FAMILY MEDICINE

## 2025-03-20 PROCEDURE — 82948 REAGENT STRIP/BLOOD GLUCOSE: CPT

## 2025-03-20 PROCEDURE — 99223 1ST HOSP IP/OBS HIGH 75: CPT | Performed by: PHYSICIAN ASSISTANT

## 2025-03-20 PROCEDURE — 85025 COMPLETE CBC W/AUTO DIFF WBC: CPT | Performed by: PHYSICIAN ASSISTANT

## 2025-03-20 RX ORDER — CITALOPRAM HYDROBROMIDE 20 MG/1
20 TABLET ORAL DAILY
Status: DISCONTINUED | OUTPATIENT
Start: 2025-03-20 | End: 2025-03-24 | Stop reason: HOSPADM

## 2025-03-20 RX ORDER — OXYBUTYNIN CHLORIDE 5 MG/1
5 TABLET, EXTENDED RELEASE ORAL DAILY
Status: DISCONTINUED | OUTPATIENT
Start: 2025-03-20 | End: 2025-03-24 | Stop reason: HOSPADM

## 2025-03-20 RX ORDER — TEMAZEPAM 15 MG/1
15 CAPSULE ORAL ONCE
Status: COMPLETED | OUTPATIENT
Start: 2025-03-20 | End: 2025-03-20

## 2025-03-20 RX ORDER — SODIUM CHLORIDE 9 MG/ML
40 INJECTION, SOLUTION INTRAVENOUS AS NEEDED
Status: DISCONTINUED | OUTPATIENT
Start: 2025-03-20 | End: 2025-03-24 | Stop reason: HOSPADM

## 2025-03-20 RX ORDER — SODIUM CHLORIDE 0.9 % (FLUSH) 0.9 %
10 SYRINGE (ML) INJECTION EVERY 12 HOURS SCHEDULED
Status: DISCONTINUED | OUTPATIENT
Start: 2025-03-20 | End: 2025-03-24 | Stop reason: HOSPADM

## 2025-03-20 RX ORDER — HYDROMORPHONE HYDROCHLORIDE 1 MG/ML
0.5 INJECTION, SOLUTION INTRAMUSCULAR; INTRAVENOUS; SUBCUTANEOUS
Status: COMPLETED | OUTPATIENT
Start: 2025-03-20 | End: 2025-03-21

## 2025-03-20 RX ORDER — NICOTINE POLACRILEX 4 MG
15 LOZENGE BUCCAL
Status: DISCONTINUED | OUTPATIENT
Start: 2025-03-20 | End: 2025-03-24 | Stop reason: HOSPADM

## 2025-03-20 RX ORDER — SODIUM CHLORIDE 9 MG/ML
100 INJECTION, SOLUTION INTRAVENOUS CONTINUOUS
Status: ACTIVE | OUTPATIENT
Start: 2025-03-20 | End: 2025-03-22

## 2025-03-20 RX ORDER — DOXYCYCLINE 100 MG/1
100 CAPSULE ORAL EVERY 12 HOURS SCHEDULED
Status: DISCONTINUED | OUTPATIENT
Start: 2025-03-20 | End: 2025-03-24 | Stop reason: HOSPADM

## 2025-03-20 RX ORDER — ACETAMINOPHEN 160 MG/5ML
650 SOLUTION ORAL EVERY 4 HOURS PRN
Status: DISCONTINUED | OUTPATIENT
Start: 2025-03-20 | End: 2025-03-21

## 2025-03-20 RX ORDER — SODIUM CHLORIDE 0.9 % (FLUSH) 0.9 %
10 SYRINGE (ML) INJECTION AS NEEDED
Status: DISCONTINUED | OUTPATIENT
Start: 2025-03-20 | End: 2025-03-24 | Stop reason: HOSPADM

## 2025-03-20 RX ORDER — ATORVASTATIN CALCIUM 20 MG/1
20 TABLET, FILM COATED ORAL DAILY
Status: DISCONTINUED | OUTPATIENT
Start: 2025-03-20 | End: 2025-03-24 | Stop reason: HOSPADM

## 2025-03-20 RX ORDER — METOPROLOL SUCCINATE 100 MG/1
100 TABLET, EXTENDED RELEASE ORAL DAILY
Status: DISCONTINUED | OUTPATIENT
Start: 2025-03-20 | End: 2025-03-24 | Stop reason: HOSPADM

## 2025-03-20 RX ORDER — HYDRALAZINE HYDROCHLORIDE 25 MG/1
25 TABLET, FILM COATED ORAL 2 TIMES DAILY
Status: DISCONTINUED | OUTPATIENT
Start: 2025-03-20 | End: 2025-03-24 | Stop reason: HOSPADM

## 2025-03-20 RX ORDER — MULTIPLE VITAMINS W/ MINERALS TAB 9MG-400MCG
1 TAB ORAL DAILY
Status: DISCONTINUED | OUTPATIENT
Start: 2025-03-20 | End: 2025-03-24 | Stop reason: HOSPADM

## 2025-03-20 RX ORDER — HYDROCODONE BITARTRATE AND ACETAMINOPHEN 5; 325 MG/1; MG/1
1 TABLET ORAL EVERY 6 HOURS PRN
Status: DISCONTINUED | OUTPATIENT
Start: 2025-03-20 | End: 2025-03-21 | Stop reason: SDUPTHER

## 2025-03-20 RX ORDER — ONDANSETRON 2 MG/ML
4 INJECTION INTRAMUSCULAR; INTRAVENOUS EVERY 6 HOURS PRN
Status: DISCONTINUED | OUTPATIENT
Start: 2025-03-20 | End: 2025-03-24 | Stop reason: HOSPADM

## 2025-03-20 RX ORDER — CLONIDINE HYDROCHLORIDE 0.1 MG/1
0.1 TABLET ORAL EVERY 12 HOURS SCHEDULED
Status: DISCONTINUED | OUTPATIENT
Start: 2025-03-20 | End: 2025-03-24 | Stop reason: HOSPADM

## 2025-03-20 RX ORDER — INSULIN LISPRO 100 [IU]/ML
2-7 INJECTION, SOLUTION INTRAVENOUS; SUBCUTANEOUS
Status: DISCONTINUED | OUTPATIENT
Start: 2025-03-20 | End: 2025-03-24 | Stop reason: HOSPADM

## 2025-03-20 RX ORDER — DEXTROSE MONOHYDRATE 25 G/50ML
25 INJECTION, SOLUTION INTRAVENOUS
Status: DISCONTINUED | OUTPATIENT
Start: 2025-03-20 | End: 2025-03-24 | Stop reason: HOSPADM

## 2025-03-20 RX ORDER — IBUPROFEN 600 MG/1
1 TABLET ORAL
Status: DISCONTINUED | OUTPATIENT
Start: 2025-03-20 | End: 2025-03-24 | Stop reason: HOSPADM

## 2025-03-20 RX ORDER — GABAPENTIN 400 MG/1
400 CAPSULE ORAL 2 TIMES DAILY
Status: DISCONTINUED | OUTPATIENT
Start: 2025-03-20 | End: 2025-03-24 | Stop reason: HOSPADM

## 2025-03-20 RX ORDER — ACETAMINOPHEN 650 MG/1
650 SUPPOSITORY RECTAL EVERY 4 HOURS PRN
Status: DISCONTINUED | OUTPATIENT
Start: 2025-03-20 | End: 2025-03-21

## 2025-03-20 RX ORDER — ACETAMINOPHEN 325 MG/1
650 TABLET ORAL EVERY 4 HOURS PRN
Status: DISCONTINUED | OUTPATIENT
Start: 2025-03-20 | End: 2025-03-21

## 2025-03-20 RX ADMIN — HYDROMORPHONE HYDROCHLORIDE 0.5 MG: 1 INJECTION, SOLUTION INTRAMUSCULAR; INTRAVENOUS; SUBCUTANEOUS at 02:14

## 2025-03-20 RX ADMIN — HYDROMORPHONE HYDROCHLORIDE 0.5 MG: 1 INJECTION, SOLUTION INTRAMUSCULAR; INTRAVENOUS; SUBCUTANEOUS at 14:26

## 2025-03-20 RX ADMIN — TEMAZEPAM 15 MG: 15 CAPSULE ORAL at 21:33

## 2025-03-20 RX ADMIN — DOXYCYCLINE 100 MG: 100 CAPSULE ORAL at 02:46

## 2025-03-20 RX ADMIN — CLONIDINE HYDROCHLORIDE 0.1 MG: 0.1 TABLET ORAL at 02:46

## 2025-03-20 RX ADMIN — AMITRIPTYLINE HYDROCHLORIDE 25 MG: 25 TABLET, FILM COATED ORAL at 02:46

## 2025-03-20 RX ADMIN — Medication 10 ML: at 02:47

## 2025-03-20 RX ADMIN — Medication 3722 UNITS: at 19:49

## 2025-03-20 RX ADMIN — TEMAZEPAM 15 MG: 15 CAPSULE ORAL at 02:46

## 2025-03-20 RX ADMIN — DOXYCYCLINE 100 MG: 100 CAPSULE ORAL at 20:52

## 2025-03-20 RX ADMIN — Medication 1 TABLET: at 19:49

## 2025-03-20 RX ADMIN — SODIUM CHLORIDE 100 ML/HR: 0.9 INJECTION, SOLUTION INTRAVENOUS at 21:32

## 2025-03-20 RX ADMIN — Medication 10 ML: at 20:52

## 2025-03-20 RX ADMIN — SODIUM CHLORIDE 100 ML/HR: 0.9 INJECTION, SOLUTION INTRAVENOUS at 13:08

## 2025-03-20 RX ADMIN — AMITRIPTYLINE HYDROCHLORIDE 25 MG: 25 TABLET, FILM COATED ORAL at 20:52

## 2025-03-20 RX ADMIN — GABAPENTIN 400 MG: 400 CAPSULE ORAL at 20:52

## 2025-03-20 RX ADMIN — HYDROMORPHONE HYDROCHLORIDE 0.5 MG: 1 INJECTION, SOLUTION INTRAMUSCULAR; INTRAVENOUS; SUBCUTANEOUS at 08:51

## 2025-03-20 NOTE — PLAN OF CARE
Problem: Adult Inpatient Plan of Care  Goal: Plan of Care Review  Outcome: Progressing  Goal: Patient-Specific Goal (Individualized)  Outcome: Progressing  Goal: Absence of Hospital-Acquired Illness or Injury  Outcome: Progressing  Goal: Optimal Comfort and Wellbeing  Outcome: Progressing  Goal: Readiness for Transition of Care  Outcome: Progressing  Intervention: Mutually Develop Transition Plan  Recent Flowsheet Documentation  Taken 3/20/2025 1113 by Mat Miranda RN  Transportation Anticipated: family or friend will provide  Patient/Family Anticipated Services at Transition: none  Patient/Family Anticipates Transition to: home with family  Taken 3/20/2025 1107 by Mat Miranda, RN  Equipment Currently Used at Home: cane, quad     Problem: Fall Injury Risk  Goal: Absence of Fall and Fall-Related Injury  Outcome: Progressing     Problem: Skin Injury Risk Increased  Goal: Skin Health and Integrity  Outcome: Progressing   Goal Outcome Evaluation:      Pt is A/OX4. Pt remained on room air throughout the shift. Pain has been controlled with PRN dilaudid as pt request medication. Complaints of right groin pain per pt. Encouraged pt OOB as tolerated, stand by assist. Using urinal and BSC as needed. Addressed pt  complaints and all concerns. IV is intact and infusing. Pt is progressing towards plan of care.

## 2025-03-20 NOTE — CONSULTS
General Surgery Consultation Note    Date of Service: 3/20/2025  Gaetano Artey  [id number]  1960      Referring Provider: Csah Chin MD    Location of Consult: Inpatient     Reason for Consultation: Right inguinal hernia       History of Present Illness:  I am seeing, Gaetano Marcelo, in consultation for Cash Chin MD regarding right inguinal hernia.  64-year-old gentleman with past medical history significant for hypertension, diabetes mellitus, DVT anticoagulated on Xarelto, and hyperlipidemia presented to the emergency room with right groin pain and associated obstructive symptoms.  On physical exam he clearly had a right inguinal hernia which Dr. Sinclair was able to reduce.  Today he is feeling much better though still has groin pain.  He denies any nausea, vomiting, or worsening abdominal pain.  He is passing flatus.  This right inguinal had been present for quite some time.  Prior evaluation by Dr. Dodson and Dr. Alves.    Problems Addressed this Visit    None  Visit Diagnoses         Incarcerated right inguinal hernia    -  Primary      Right lower quadrant abdominal pain              Diagnoses         Codes Comments      Incarcerated right inguinal hernia    -  Primary ICD-10-CM: K40.30  ICD-9-CM: 550.10       Right lower quadrant abdominal pain     ICD-10-CM: R10.31  ICD-9-CM: 789.03             Past Medical History:   Diagnosis Date    GERD (gastroesophageal reflux disease)     Hyperlipidemia     Hypertension     Insomnia     Low back pain     Osteoarthritis     Pain, phantom limb     Peripheral edema     Type 2 diabetes mellitus        Past Surgical History:    APPENDECTOMY    CARPAL TUNNEL RELEASE    Dr. Yosef Diaz    CHOLECYSTECTOMY    HERNIA REPAIR    REPLACEMENT TOTAL KNEE    KANWAL-EN-Y PROCEDURE    TOE AMPUTATION    1st, 2nd Rt toes    ULNAR NERVE DECOMPRESSION    Dr. Yosef Diaz       Allergies   Allergen Reactions    Hydrochlorothiazide Rash    Penicillins Rash       No current  facility-administered medications on file prior to encounter.     Current Outpatient Medications on File Prior to Encounter   Medication Sig Dispense Refill    5-Hydroxytryptophan (5-HTP PO) 5-HTP      acetaminophen (TYLENOL) 325 MG tablet Take 1 tablet by mouth Every 4 (Four) Hours As Needed.      amitriptyline (ELAVIL) 25 MG tablet Take 1 tablet by mouth Every Night.      Aspirin Buf,CaCarb-MgCarb-MgO, 81 MG tablet aspirin 81 mg tablet   Daily      atorvastatin (LIPITOR) 20 MG tablet Take 1 tablet by mouth Daily.      Bismuth Subgallate (DEVROM PO) Take  by mouth.      Blood Glucose Monitoring Suppl device 1 Device by Other route Take As Directed. Using to test glucose. 1 each 0    Calcium Carbonate-Vit D-Min (CALCIUM 1200 PO) Take 1,200 mcg by mouth.      Cholecalciferol (Vitamin D3) 1.25 MG (16410 UT) capsule Vitamin D3   Daily      citalopram (CeleXA) 20 MG tablet citalopram 20 mg tablet      cloNIDine (CATAPRES) 0.1 MG tablet 1 tablet 2 (Two) Times a Day.      clopidogrel (PLAVIX) 75 MG tablet       cyanocobalamin (VITAMIN B-12) 1000 MCG tablet Vitamin B-12  1,000 mcg tablet   TAKE 1 TABLET BY MOUTH ONCE DAILY      doxycycline (VIBRAMYCIN) 100 MG capsule Take 1 capsule by mouth 2 (Two) Times a Day. 14 capsule 0    esomeprazole (nexIUM) 20 MG capsule Take 1 capsule by mouth Every Morning Before Breakfast.      fluticasone (FLONASE) 50 MCG/ACT nasal spray Flonase Allergy Relief 50 mcg/actuation nasal spray,suspension   2 sprays each nostril once daily      Folinic Acid-Vit B6-Vit B12 (Folinic-Plus) 4-50-2 MG tablet 1 tablet DAILY (route: oral)      gabapentin (NEURONTIN) 400 MG capsule Take 1 capsule by mouth 2 (Two) Times a Day.      Ginkgo Biloba 120 MG capsule ginkgo biloba 40 mg capsule   daily      glucose blood test strip 1 each by Other route 2 (Two) Times a Day.  each 1    hydrALAZINE (APRESOLINE) 25 MG tablet Take 1 tablet by mouth 2 (Two) Times a Day.      HYDROcodone-acetaminophen (NORCO) 5-325  MG per tablet Take 1 tablet by mouth Every 6 (Six) Hours As Needed for Moderate Pain or Severe Pain. 12 tablet 0    L-Methylfolate-Algae-B12-B6 (METANX PO) Take  by mouth 2 (Two) Times a Day.      Lancets 33G misc Use 1 each 3 (Three) Times a Day. 100 each 2    loperamide (IMODIUM) 2 MG capsule loperamide 2 mg capsule      metFORMIN ER (GLUCOPHAGE-XR) 500 MG 24 hr tablet TAKE 1 TABLET TWICE A  tablet 3    methocarbamol (ROBAXIN) 750 MG tablet Take 1 tablet by mouth 2 (Two) Times a Day.      metoprolol succinate XL (TOPROL-XL) 100 MG 24 hr tablet Take 1 tablet by mouth Daily.      multivitamin (THERAGRAN) tablet tablet Multiple Vitamin capsule      Omega-3 Fatty Acids (fish oil) 1000 MG capsule capsule Take  by mouth Daily With Breakfast.      oxybutynin XL (DITROPAN XL) 15 MG 24 hr tablet Take 1 tablet by mouth Daily.      oxyCODONE (ROXICODONE) 5 MG immediate release tablet Take 1 tablet by mouth Every 12 (Twelve) Hours.      Rivaroxaban (XARELTO) 2.5 MG tablet Take 1 tablet by mouth Daily.      Semaglutide, 1 MG/DOSE, (Ozempic, 1 MG/DOSE,) 4 MG/3ML solution pen-injector Inject 1 mg under the skin into the appropriate area as directed 1 (One) Time Per Week. Getting through [employer] PAP      temazepam (RESTORIL) 15 MG capsule Take 2 capsules by mouth At Night As Needed for Sleep. (Patient taking differently: Take 1 capsule by mouth At Night As Needed for Sleep.)      trospium (SANCTURA) 20 MG tablet            Current Facility-Administered Medications:     acetaminophen (TYLENOL) tablet 650 mg, 650 mg, Oral, Q4H PRN **OR** acetaminophen (TYLENOL) 160 MG/5ML oral solution 650 mg, 650 mg, Oral, Q4H PRN **OR** acetaminophen (TYLENOL) suppository 650 mg, 650 mg, Rectal, Q4H PRN, Shania Mcgill PA-C    amitriptyline (ELAVIL) tablet 25 mg, 25 mg, Oral, Nightly, Shania Mcgill PA-MARGOTH, 25 mg at 03/20/25 0246    atorvastatin (LIPITOR) tablet 20 mg, 20 mg, Oral, Daily, Shania Mcgill, WHITNEY    citalopram (CeleXA)  tablet 20 mg, 20 mg, Oral, Daily, Shania Mcgill PA-C    [Held by provider] cloNIDine (CATAPRES) tablet 0.1 mg, 0.1 mg, Oral, Q12H, Shania Mcgill PA-C, 0.1 mg at 03/20/25 0246    dextrose (D50W) (25 g/50 mL) IV injection 25 g, 25 g, Intravenous, Q15 Min PRN, Shania Mcgill PA-C    dextrose (GLUTOSE) oral gel 15 g, 15 g, Oral, Q15 Min PRN, Shania Mcgill PA-C    doxycycline (MONODOX) capsule 100 mg, 100 mg, Oral, Q12H, Shania Mcgill PA-C, 100 mg at 03/20/25 0246    gabapentin (NEURONTIN) capsule 400 mg, 400 mg, Oral, BID, Reji Doty MD    glucagon (GLUCAGEN) injection 1 mg, 1 mg, Intramuscular, Q15 Min PRN, Shania Mcgill PA-C    [Held by provider] hydrALAZINE (APRESOLINE) tablet 25 mg, 25 mg, Oral, BID, Shania Mcgill PA-C    HYDROcodone-acetaminophen (NORCO) 5-325 MG per tablet 1 tablet, 1 tablet, Oral, Q6H PRN, Reji Doty MD    HYDROmorphone (DILAUDID) injection 0.5 mg, 0.5 mg, Intravenous, Q3H PRN, Reji Doty MD, 0.5 mg at 03/20/25 1426    Insulin Lispro (humaLOG) injection 2-7 Units, 2-7 Units, Subcutaneous, 4x Daily AC & at Bedtime, Shania Mcgill PA-C    lansoprazole (PREVACID SOLUTAB) disintegrating tablet Tablet Delayed Release Dispersible 30 mg, 30 mg, Nasogastric, Q AM, Shania Mcgill PA-C    melatonin tablet 5 mg, 5 mg, Oral, Nightly PRN, Shania Mcgill PA-C    metoprolol succinate XL (TOPROL-XL) 24 hr tablet 100 mg, 100 mg, Oral, Daily, Shania Mcgill PA-C    ondansetron (ZOFRAN) injection 4 mg, 4 mg, Intravenous, Q6H PRN, Shania Mcgill PA-C    oxybutynin XL (DITROPAN-XL) 24 hr tablet 5 mg, 5 mg, Oral, Daily, Shania Mcgill PA-C    [COMPLETED] Insert Peripheral IV, , , Once **AND** sodium chloride 0.9 % flush 10 mL, 10 mL, Intravenous, PRN, Irlanda Sinclair MD    sodium chloride 0.9 % flush 10 mL, 10 mL, Intravenous, Q12H, Shania Mcgill PA-C, 10 mL at 03/20/25 0247    sodium chloride 0.9 % flush 10 mL, 10 mL,  "Intravenous, PRN, Shania Mcgill PA-C    sodium chloride 0.9 % infusion 40 mL, 40 mL, Intravenous, PRN, Shania Mcgill PA-C    sodium chloride 0.9 % infusion, 100 mL/hr, Intravenous, Continuous, Cash Chin MD, Last Rate: 100 mL/hr at 03/20/25 1308, 100 mL/hr at 03/20/25 1308    Family History   Problem Relation Age of Onset    Diabetes Mother     Diabetes Brother     Diabetes Paternal Grandmother     Diabetes Father     Coronary artery disease Father      Social History     Socioeconomic History    Marital status:    Tobacco Use    Smoking status: Never     Passive exposure: Never    Smokeless tobacco: Never   Vaping Use    Vaping status: Never Used   Substance and Sexual Activity    Alcohol use: Not Currently    Drug use: Never    Sexual activity: Defer       Review of Systems:  Review of Systems   Constitutional:  Positive for appetite change. Negative for fever.   HENT:  Negative for congestion, ear pain and postnasal drip.    Eyes:  Negative for photophobia and visual disturbance.   Respiratory:  Negative for apnea and cough.    Cardiovascular:  Negative for chest pain and leg swelling.   Gastrointestinal:  Positive for abdominal pain. Negative for blood in stool, nausea and vomiting.   Endocrine: Negative for polyphagia and polyuria.   Genitourinary:  Negative for difficulty urinating, hematuria and urgency.   Musculoskeletal:  Negative for back pain and myalgias.   Skin:  Negative for pallor and rash.   Allergic/Immunologic: Negative for immunocompromised state.   Neurological:  Negative for dizziness, tremors and numbness.   Hematological:  Negative for adenopathy.   Psychiatric/Behavioral:  Negative for agitation, dysphoric mood and self-injury.      Otherwise the 12 point review of systems is negative.    /75 (BP Location: Left arm, Patient Position: Lying)   Pulse 77   Temp 97.8 °F (36.6 °C) (Oral)   Resp 18   Ht 182.9 cm (72\")   Wt 105 kg (232 lb)   SpO2 97%   BMI 31.46 " kg/m²   Body mass index is 31.46 kg/m².    General: Laying in bed mild distress  HEENT: PER, no icterus, normal sclerae  Cardiac: regular rhythm,  no audible rubs  Pulmonary: bilateral breath sounds, nonlabored  Abdominal: Soft, no generalized peritonitis, reduced right inguinal hernia, mildly tender  Neurologic: awake, alert, no obvious focal deficits  Extremities: warm, no edema  Skin: no obvious rashes nor worrisome lesions seen     CBC  Results from last 7 days   Lab Units 03/20/25  0527   WBC 10*3/mm3 4.39   HEMOGLOBIN g/dL 11.6*   HEMATOCRIT % 36.8*   PLATELETS 10*3/mm3 207       CMP  Results from last 7 days   Lab Units 03/20/25  0527   SODIUM mmol/L 138   POTASSIUM mmol/L 4.5   CHLORIDE mmol/L 106   CO2 mmol/L 24.0   BUN mg/dL 14   CREATININE mg/dL 0.64*   CALCIUM mg/dL 8.1*   BILIRUBIN mg/dL 0.3   ALK PHOS U/L 65   ALT (SGPT) U/L 40   AST (SGOT) U/L 45*   GLUCOSE mg/dL 89     Assessment:  Right inguinal hernia, reduced  Diabetes mellitus  Hypertension  History of gastric bypass    Plan:  Still has right groin discomfort.  The hernia is reduced.  I reviewed the CT from the outside institution done on 3/19/2025.  He is still having pain requiring narcotic pain medicine.  I did give him the option of having this fixed electively.  I am going to go ahead and put him on for the OR schedule tomorrow.  Will reverse his Xarelto with [employer].  I discussed the associated risks and benefits of inguinal hernia repair with the patient and his family.  He understands.  All questions were answered.  N.p.o. after midnight.    Han Naranjo MD  03/20/25  18:05 EDT

## 2025-03-20 NOTE — ED PROVIDER NOTES
"Subjective   History of Present Illness  64 year old male with history of DM, gastric bypass surgery in 2004 by Dr. Alves at [facility], and ventral hernia repair by Dr. Alves in 2014 (he believes with mesh) presents to the emergency department accompanied by his wife with concerns about 8/10 \"throbbing and burning\" right lower quadrant pain radiating diffusely to his abdomen. Pain has been intermittent for approximately 6 months, had improved, then worsened. Pain had been severe for the last several days, and he had associated diaphoresis, and nausea without vomiting. He is under Dr. Donohue's care for spine infection which was thought to have originated from a toe infection which underwent amputation. Dr. Donohue ordered an outpatient CT abdomen and pelvis which was done this afternoon at approximately 1600 through [facility]. The patient received a phone call and was advised to go directly to our ER for treatment of an incarcerated right inguinal hernia with possible early bowel obstruction.  He denies recent measured fever. He tried Norco and Flexeril prior to arrival without relief. His most recent dose was at approximately 1730, and he ate two hamburgers at that time. He is passing flatus. He takes Xarelto, clopidogrel, and aspirin 81 mg daily. Dr. Alves is aware of this right inguinal hernia, and had spoken with general surgeon Dr. Dodson about four months ago about hernia repair, but the patient had not actually seen Dr. Dodson as a patient, and Dr. Alves told the patient that Dr. Dodson did not feel that the hernia was large enough to warrant repair at that time.       Review of Systems   Constitutional:  Positive for diaphoresis.   HENT:  Negative for facial swelling and nosebleeds.    Eyes:  Negative for photophobia and discharge.   Respiratory:  Negative for stridor.    Gastrointestinal:  Positive for nausea. Negative for vomiting.   Musculoskeletal:         Podiatrist worked on right " foot, diabetic plantar foot ulcer Monday, 2 days ago, dressing in place.   Neurological:  Negative for facial asymmetry and speech difficulty.       Past Medical History:   Diagnosis Date    GERD (gastroesophageal reflux disease)     Hyperlipidemia     Hypertension     Insomnia     Low back pain     Osteoarthritis     Pain, phantom limb     Peripheral edema     Type 2 diabetes mellitus        Allergies   Allergen Reactions    Hydrochlorothiazide Rash    Penicillins Rash       Past Surgical History:   Procedure Laterality Date    APPENDECTOMY      CARPAL TUNNEL RELEASE Left 08/18/2020    Dr. Yosef Diaz    CHOLECYSTECTOMY      HERNIA REPAIR      REPLACEMENT TOTAL KNEE Right     KANWAL-EN-Y PROCEDURE  2004    TOE AMPUTATION Right 2013    1st, 2nd Rt toes    ULNAR NERVE DECOMPRESSION Left 08/18/2020    Dr. Yosef Diaz       Family History   Problem Relation Age of Onset    Diabetes Mother     Diabetes Brother     Diabetes Paternal Grandmother     Diabetes Father     Coronary artery disease Father        Social History     Socioeconomic History    Marital status:    Tobacco Use    Smoking status: Never     Passive exposure: Never    Smokeless tobacco: Never   Vaping Use    Vaping status: Never Used   Substance and Sexual Activity    Alcohol use: Not Currently    Drug use: Never    Sexual activity: Defer     Retired.      Objective   Physical Exam  Nursing note reviewed.   Constitutional:       Appearance: He is not diaphoretic.      Comments: BMI 31. Patient appears uncomfortable. Patient initially evaluated in the provider in triage area due to a full emergency department.    HENT:      Mouth/Throat:      Mouth: Mucous membranes are moist.      Pharynx: Oropharynx is clear. No pharyngeal swelling.   Eyes:      General: No scleral icterus.  Cardiovascular:      Rate and Rhythm: Normal rate and regular rhythm.      Heart sounds: Normal heart sounds. No murmur heard.     No friction rub. No gallop.   Pulmonary:       Effort: Pulmonary effort is normal. No respiratory distress.      Breath sounds: Normal breath sounds. No stridor. No wheezing, rhonchi or rales.   Abdominal:      General: Abdomen is protuberant. A surgical scar is present.      Palpations: Abdomen is soft.      Tenderness: There is abdominal tenderness in the right lower quadrant. There is no guarding.      Hernia: Hernia is present in the right inguinal area (direct, tender, hard. I do not appreciate overlying skin change).      Comments: midline   Skin:     General: Skin is warm and dry.   Neurological:      Mental Status: He is alert.         Procedural Sedation    Date/Time: 3/20/2025 12:52 AM    Performed by: Irlanda Sinclair MD  Authorized by: Reji Doty MD    Consent:     Consent obtained:  Written and verbal    Consent given by:  Patient    Risks, benefits, and alternatives were discussed: yes      Risks discussed:  Vomiting, nausea, inadequate sedation, respiratory compromise necessitating ventilatory assistance and intubation and allergic reaction    Alternatives discussed:  Anxiolysis and analgesia without sedation  Universal protocol:     Procedure explained and questions answered to patient or proxy's satisfaction: yes      Immediately prior to procedure, a time out was called: yes      Patient identity confirmed:  Verbally with patient and arm band  Indications:     Sedation is required to allow for: right inguinal hernia reduction.    Procedure necessitating sedation performed by:  Physician performing sedation    Intended level of sedation:  Moderate  Pre-sedation assessment:     Time since last food or drink:  1730 last food, about 5 hours. Had ice chips while in the ER.    NPO status caution: urgency dictates proceeding with non-ideal NPO status      ASA classification: class 3 - patient with severe systemic disease      Mouth openin finger widths    Mallampati score:  III - soft palate, base of uvula visible    Pre-sedation  assessments completed and reviewed: airway patency, cardiovascular function, hydration status, mental status, pain level, respiratory function and temperature      Pre-sedation assessment completed:  3/19/2025 10:10 PM  Immediate pre-procedure details:     Reassessment: Patient reassessed immediately prior to procedure      Reviewed: vital signs, relevant labs/tests and NPO status      Verified: bag valve mask available, emergency equipment available, intubation equipment available, IV patency confirmed, oxygen available and suction available    Procedure details (see MAR for exact dosages):     Sedation start time:  3/19/2025 10:49 PM    Preoxygenation:  Nasal cannula    Sedation:  Propofol (and versed 1 mg IV)    Analgesia:  Fentanyl    Intra-procedure monitoring:  Blood pressure monitoring, frequent LOC assessments, frequent vital sign checks, continuous pulse oximetry and cardiac monitor    Intra-procedure events: none      Sedation end time:  3/19/2025 10:53 PM    Total sedation time (minutes):  4  Post-procedure details:     Post-sedation assessment completed:  3/20/2025 12:00 AM    Attendance: Constant attendance by certified staff until patient recovered      Recovery: Patient returned to pre-procedure baseline      Patient is stable for discharge or admission: yes      Procedure completion:  Tolerated well, no immediate complications  Comments:      Patient is amnestic to event. Right inguinal hernia was reduced with constant gentle pressure in Trendelenburg position.              ED Course  ED Course as of 03/20/25 0058   Wed Mar 19, 2025   2158 After 1 dilaudid IV I attempted reduction of right inguinal hernia. He did not tolerate it well, it is hard and edematous, and it was an unsuccessful attempt. [LD]   2331 Christiane Cummings bariatric surgery paged through medical society.  [LD]   2342 I discussed the patient with Christiane Cummings of bariatric surgery, she will discuss w Dr. Alva and call me back.  [LD]    Thu Mar 20, 2025   0037 Dr. Alva defers to general surgery. I discussed the patient with general surgeon on call Dr. Naranjo by phone. He does not feel strongly about repeating the CT scan at this time as the patient's lab results are reassuring, but repeat the scan if AM labs are showing abnormalities or if he gets a fever, admit to hospitalist and general surgery can see the patient in consultation in the morning. [LD]      ED Course User Index  [LD] Irlanda Sinclair MD                                                       Medical Decision Making  Differential diagnosis includes incarcerated hernia, bowel obstruction, bowel ischemia, strangulated hernia, and others.    Problems Addressed:  Incarcerated right inguinal hernia: complicated acute illness or injury  Right lower quadrant abdominal pain: complicated acute illness or injury    Amount and/or Complexity of Data Reviewed  Independent Historian: spouse  External Data Reviewed: radiology.     Details: Impression    Right inguinal hernia containing bowel that is mildly  distended. Early bowel obstruction is not excluded.    Possible discitis/osteomyelitis of the L4-5 and L5-S1 levels.    The ordering provider's office was notified at  3/19/2025 4:53 PM            Images reviewed, interpreted, and dictated by Dr. SEFERINO Jacobo.  Transcribed by Anabella Vergara PA-C.  Narrative    CT SCAN OF THE ABDOMEN AND PELVIS WITHOUT CONTRAST     3/19/2025 4:01 PM      HISTORY: Acute right groin pain.    COMPARISON: January 9, 2012.    PROCEDURE: Axial images were obtained from the lung bases to the pubic  symphysis by computed tomography. Oral contrast was administered. This  study was performed with techniques to keep radiation doses as low as  reasonably achievable, (ALARA). Individualized dose reduction techniques  using automated exposure control or adjustment of mA and/or kV according  to the patient size were employed.    FINDINGS:    ABDOMEN: The lung bases are  clear. The heart size is normal. The patient  is status post gastric bypass surgery. The limited non-contrast images  of the liver are unremarkable. The gallbladder is absent. The spleen is  unremarkable. No adrenal masses are seen. The aorta is normal in  caliber. There is no significant free fluid or adenopathy.  There is no  nephrolithiasis. There is no hydronephrosis. A ventral hernia is noted  left of the umbilicus containing unobstructed bowel.    PELVIS:  A right inguinal hernia is noted containing bowel that is  mildly distended. An early bowel obstruction is not excluded. The  appendix is not identified. The urinary bladder is unremarkable. There  is no fluid or adenopathy.  Postoperative changes of posterior fixation  of L4-S1 are noted. There is endplate irregularity at these levels.  There is possible discitis/osteomyelitis at the L4-5 and L5-S1 levels.  Exam End: 03/19/25 16:12   Specimen Collected: 03/19/25 16:50 Last Resulted: 03/19/25 17:01  Received From: [facility]  Result Received: 03/19/25 20:39     I am unable to view the actual CT images from the CT scan today at outside facility, but reviewed the report.  Labs: ordered. Decision-making details documented in ED Course.  Discussion of management or test interpretation with external provider(s): See ED course. Hospitalist contacted.    Risk  Prescription drug management.  Parenteral controlled substances.  Decision regarding hospitalization.      Recent Results (from the past 24 hours)   Comprehensive Metabolic Panel    Collection Time: 03/19/25  9:13 PM    Specimen: Blood   Result Value Ref Range    Glucose 120 (H) 65 - 99 mg/dL    BUN 17 8 - 23 mg/dL    Creatinine 0.92 0.76 - 1.27 mg/dL    Sodium 137 136 - 145 mmol/L    Potassium 5.0 3.5 - 5.2 mmol/L    Chloride 103 98 - 107 mmol/L    CO2 23.0 22.0 - 29.0 mmol/L    Calcium 8.3 (L) 8.6 - 10.5 mg/dL    Total Protein 5.6 (L) 6.0 - 8.5 g/dL    Albumin 3.5 3.5 - 5.2 g/dL    ALT  (SGPT) 50 (H) 1 - 41 U/L    AST (SGOT) 68 (H) 1 - 40 U/L    Alkaline Phosphatase 70 39 - 117 U/L    Total Bilirubin 0.2 0.0 - 1.2 mg/dL    Globulin 2.1 gm/dL    A/G Ratio 1.7 g/dL    BUN/Creatinine Ratio 18.5 7.0 - 25.0    Anion Gap 11.0 5.0 - 15.0 mmol/L    eGFR 91.7 >60.0 mL/min/1.73   Lipase    Collection Time: 03/19/25  9:13 PM    Specimen: Blood   Result Value Ref Range    Lipase 11 (L) 13 - 60 U/L   Lactic Acid, Plasma    Collection Time: 03/19/25  9:13 PM    Specimen: Blood   Result Value Ref Range    Lactate 1.2 0.5 - 2.0 mmol/L   CBC Auto Differential    Collection Time: 03/19/25  9:13 PM    Specimen: Blood   Result Value Ref Range    WBC 4.90 3.40 - 10.80 10*3/mm3    RBC 4.53 4.14 - 5.80 10*6/mm3    Hemoglobin 12.7 (L) 13.0 - 17.7 g/dL    Hematocrit 40.1 37.5 - 51.0 %    MCV 88.5 79.0 - 97.0 fL    MCH 28.0 26.6 - 33.0 pg    MCHC 31.7 31.5 - 35.7 g/dL    RDW 14.6 12.3 - 15.4 %    RDW-SD 47.5 37.0 - 54.0 fl    MPV 9.5 6.0 - 12.0 fL    Platelets 250 140 - 450 10*3/mm3    Neutrophil % 57.0 42.7 - 76.0 %    Lymphocyte % 30.6 19.6 - 45.3 %    Monocyte % 9.2 5.0 - 12.0 %    Eosinophil % 2.2 0.3 - 6.2 %    Basophil % 0.8 0.0 - 1.5 %    Immature Grans % 0.2 0.0 - 0.5 %    Neutrophils, Absolute 2.79 1.70 - 7.00 10*3/mm3    Lymphocytes, Absolute 1.50 0.70 - 3.10 10*3/mm3    Monocytes, Absolute 0.45 0.10 - 0.90 10*3/mm3    Eosinophils, Absolute 0.11 0.00 - 0.40 10*3/mm3    Basophils, Absolute 0.04 0.00 - 0.20 10*3/mm3    Immature Grans, Absolute 0.01 0.00 - 0.05 10*3/mm3    nRBC 0.0 0.0 - 0.2 /100 WBC   Green Top (Gel)    Collection Time: 03/19/25  9:13 PM   Result Value Ref Range    Extra Tube Hold for add-ons.    Lavender Top    Collection Time: 03/19/25  9:13 PM   Result Value Ref Range    Extra Tube hold for add-on    Gold Top - SST    Collection Time: 03/19/25  9:13 PM   Result Value Ref Range    Extra Tube Hold for add-ons.    Gray Top    Collection Time: 03/19/25  9:13 PM   Result Value Ref Range    Extra Tube  Hold for add-ons.    Light Blue Top    Collection Time: 03/19/25  9:13 PM   Result Value Ref Range    Extra Tube Hold for add-ons.    Magnesium    Collection Time: 03/19/25  9:13 PM    Specimen: Blood   Result Value Ref Range    Magnesium 1.6 1.6 - 2.4 mg/dL     Note: In addition to lab results from this visit, the labs listed above may include labs taken at another facility or during a different encounter within the last 24 hours. Please correlate lab times with ED admission and discharge times for further clarification of the services performed during this visit.    No orders to display     Vitals:    03/19/25 2345 03/19/25 2350 03/19/25 2355 03/20/25 0045   BP: 112/45 122/60 128/49 134/66   BP Location:    Left arm   Patient Position:    Lying   Pulse: 72 80 70 70   Resp:    18   Temp:       TempSrc:       SpO2: 97% 99% 99% 98%   Weight:       Height:         Medications   sodium chloride 0.9 % flush 10 mL (has no administration in time range)   sodium chloride 0.9 % infusion (100 mL/hr Intravenous New Bag 3/19/25 2248)   ondansetron (ZOFRAN) injection 4 mg (4 mg Intravenous Given 3/19/25 2138)   HYDROmorphone (DILAUDID) injection 1 mg (1 mg Intravenous Given 3/19/25 2138)   oxyCODONE-acetaminophen (PERCOCET) 5-325 MG per tablet 1 tablet (1 tablet Oral Given 3/19/25 2139)   midazolam (VERSED) injection 1 mg (1 mg Intravenous Given 3/19/25 2248)   fentaNYL citrate (PF) (SUBLIMAZE) injection 50 mcg (50 mcg Intravenous Given 3/19/25 2243)   propofol (DIPRIVAN) injection 100 mg (50 mg Intravenous Given by Other 3/19/25 2249)     ECG/EMG Results (last 24 hours)       ** No results found for the last 24 hours. **          No orders to display           Final diagnoses:   Incarcerated right inguinal hernia   Right lower quadrant abdominal pain       ED Disposition  ED Disposition       ED Disposition   Decision to Admit    Condition   --    Comment   Level of Care: Telemetry [5]   Diagnosis: SBO (small bowel  obstruction) [739426]   Admitting Physician: RAPHAEL BRUCE [[id number]]   Attending Physician: RAPHAEL BRUCE [[id number]]   Certification: I Certify That Inpatient Hospital Services Are Medically Necessary For Greater Than 2 Midnights                 No follow-up provider specified.       Medication List      No changes were made to your prescriptions during this visit.            Irlanda Sinclair MD  03/20/25 0059

## 2025-03-20 NOTE — H&P
"    [facility] Medicine Services  HISTORY AND PHYSICAL    Patient Name: Gaetano Marcelo  : 1960  MRN: [id number]  Primary Care Physician: Tushar Shepherd Jr., MD  Date of admission: 3/19/2025    Subjective   Subjective     Chief Complaint:  Abdominal pain    HPI:  Gaetano Marcelo is a 64 y.o. male with a past medical history significant for DVT on xarelto, PVD on ASA and Plavix, chronic back pain, GERD, HTN, HLD, DM2, anxiety/depression, chronic back pain, and anemia. Atient presents today with complaints of 8/10 \"throbbing and burning\" right lower quadrant pain radiating diffusely to his abdomen. Pain has been intermittent for approximately 6 months, had improved, then worsened. Pain had been severe for the last several days, and he had associated diaphoresis, and nausea without vomiting. Has tried Norco and Flexeril at home without relief. Still has normal appetite,eating well. Normal bowel movements and still passing gas. Patient was under Dr. Donohue's care for spine infection which was thought to have originated from a toe infection now s/p right toe amputations. Dr. Donohue ordered an outpatient CT abdomen and pelvis which was done this afternoon at approximately 1600 through [facility]. The patient received a phone call and was advised to go directly to our ER for treatment of an incarcerated right inguinal hernia with possible early bowel obstruction.   Pertinent bowel surgeries include gastric bypass surgery in  by Dr. Alves at [facility], and ventral hernia repair by Dr. Alves in  (he believes with mesh), prior hernia repair, appendectomy, and cholecystectomy. Dr. Alves is aware of this right inguinal hernia, and had spoken with general surgeon Dr. Dodson about four months ago about hernia repair, but the patient had not actually seen Dr. Dodson as a patient, and Dr. Alves told the patient that Dr. Dodson did not feel that the hernia was large " enough to warrant repair at that time.  Currently there are no complaints of fever, cough, congestion, SOB, or chest pan. No changes in bowel habits or blood in stool. No dysuria or flank pain. Will admit to inpatient for further evaluation and treatment.        Review of Systems   Constitutional:  Negative for chills, fatigue and fever.   HENT:  Negative for congestion and trouble swallowing.    Eyes:  Negative for photophobia and visual disturbance.   Respiratory:  Negative for cough and shortness of breath.    Cardiovascular:  Negative for chest pain and leg swelling.   Gastrointestinal:  Positive for abdominal pain and nausea. Negative for vomiting.   Endocrine: Negative for cold intolerance and heat intolerance.   Genitourinary:  Negative for dysuria and flank pain.   Musculoskeletal:  Negative for back pain and gait problem.   Skin:  Negative for pallor and rash.   Allergic/Immunologic: Positive for immunocompromised state.   Neurological:  Negative for dizziness and headaches.   Hematological:  Negative for adenopathy.   Psychiatric/Behavioral:  Negative for agitation and confusion.             Personal History     Past Medical History:   Diagnosis Date    GERD (gastroesophageal reflux disease)     Hyperlipidemia     Hypertension     Insomnia     Low back pain     Osteoarthritis     Pain, phantom limb     Peripheral edema     Type 2 diabetes mellitus              Past Surgical History:   Procedure Laterality Date    APPENDECTOMY      CARPAL TUNNEL RELEASE Left 08/18/2020    Dr. Yosef Diaz    CHOLECYSTECTOMY      HERNIA REPAIR      REPLACEMENT TOTAL KNEE Right     KANWAL-EN-Y PROCEDURE  2004    TOE AMPUTATION Right 2013    1st, 2nd Rt toes    ULNAR NERVE DECOMPRESSION Left 08/18/2020    Dr. Yosef Diaz       Family History: family history includes Coronary artery disease in his father; Diabetes in his brother, father, mother, and paternal grandmother.     Social History:  reports that he has never smoked. He  has never been exposed to tobacco smoke. He has never used smokeless tobacco. He reports that he does not currently use alcohol. He reports that he does not use drugs.  Social History     Social History Narrative    Not on file       Medications:  5-Hydroxytryptophan, Aspirin Buf(CaCarb-MgCarb-MgO), Bismuth Subgallate, Blood Glucose Monitoring Suppl, Calcium Carbonate-Vit D-Min, Folinic Acid-Vit B6-Vit B12, Ginkgo Biloba, HYDROcodone-acetaminophen, L-Methylfolate-Algae-B12-B6, Lancets 33G, Rivaroxaban, Semaglutide (1 MG/DOSE), Vitamin D3, acetaminophen, amitriptyline, atorvastatin, citalopram, cloNIDine, clopidogrel, cyanocobalamin, doxycycline, esomeprazole, fish oil, fluticasone, gabapentin, glucose blood, hydrALAZINE, loperamide, metFORMIN ER, methocarbamol, metoprolol succinate XL, multivitamin, oxyCODONE, oxybutynin XL, temazepam, and trospium    Allergies   Allergen Reactions    Hydrochlorothiazide Rash    Penicillins Rash       Objective   Objective     Vital Signs:   Temp:  [98.2 °F (36.8 °C)] 98.2 °F (36.8 °C)  Heart Rate:  [70-81] 70  Resp:  [14-20] 18  BP: ()/(37-68) 134/66    Physical Exam   Constitutional: Awake, alert  Eyes: PERRLA, sclerae anicteric, no conjunctival injection  HENT: NCAT, mucous membranes moist  Neck: Supple, no thyromegaly, no lymphadenopathy, trachea midline  Respiratory: Clear to auscultation bilaterally, nonlabored respirations   Cardiovascular: RRR, no murmurs, rubs, or gallops, palpable pedal pulses bilaterally  Gastrointestinal: Positive bowel sounds, soft, TTP, nondistended  Musculoskeletal: No bilateral ankle edema, no clubbing or cyanosis to extremities  Amputation of 2 right toes  Psychiatric: Appropriate affect, cooperative  Neurologic: Oriented x 3, strength symmetric in all extremities, Cranial Nerves grossly intact to confrontation, speech clear  Skin: No rashes      Result Review:  I have personally reviewed the results from the time of this admission to  3/20/2025 01:02 EDT and agree with these findings:  [x]  Laboratory list / accordion  []  Microbiology  []  Radiology  []  EKG/Telemetry   []  Cardiology/Vascular   []  Pathology  [x]  Old records  []  Other:  Most notable findings include: vitals stable. Glucose 120.AST 68. ALT50. Hemoglobin 12.7.labs otherwise favorable.     LAB RESULTS:      Lab 03/19/25 2113   WBC 4.90   HEMOGLOBIN 12.7*   HEMATOCRIT 40.1   PLATELETS 250   NEUTROS ABS 2.79   IMMATURE GRANS (ABS) 0.01   LYMPHS ABS 1.50   MONOS ABS 0.45   EOS ABS 0.11   MCV 88.5   LACTATE 1.2         Lab 03/19/25 2113   SODIUM 137   POTASSIUM 5.0   CHLORIDE 103   CO2 23.0   ANION GAP 11.0   BUN 17   CREATININE 0.92   EGFR 91.7   GLUCOSE 120*   CALCIUM 8.3*   MAGNESIUM 1.6         Lab 03/19/25 2113   TOTAL PROTEIN 5.6*   ALBUMIN 3.5   GLOBULIN 2.1   ALT (SGPT) 50*   AST (SGOT) 68*   BILIRUBIN 0.2   ALK PHOS 70   LIPASE 11*                     Brief Urine Lab Results  (Last result in the past 365 days)        Color   Clarity   Blood   Leuk Est   Nitrite   Protein   CREAT   Urine HCG        08/21/24 1216             49.5               Microbiology Results (last 10 days)       ** No results found for the last 240 hours. **            CT Abdomen Pelvis Without Contrast  Result Date: 3/19/2025  CT SCAN OF THE ABDOMEN AND PELVIS WITHOUT CONTRAST     3/19/2025 4:01 PM HISTORY: Acute right groin pain. COMPARISON: January 9, 2012. PROCEDURE: Axial images were obtained from the lung bases to the pubic symphysis by computed tomography. Oral contrast was administered. This study was performed with techniques to keep radiation doses as low as reasonably achievable, (ALARA). Individualized dose reduction techniques using automated exposure control or adjustment of mA and/or kV according  to the patient size were employed. FINDINGS: ABDOMEN: The lung bases are clear. The heart size is normal. The patient is status post gastric bypass surgery. The limited non-contrast images of  the liver are unremarkable. The gallbladder is absent. The spleen is unremarkable. No adrenal masses are seen. The aorta is normal in caliber. There is no significant free fluid or adenopathy.  There is no nephrolithiasis. There is no hydronephrosis. A ventral hernia is noted left of the umbilicus containing unobstructed bowel. PELVIS:  A right inguinal hernia is noted containing bowel that is mildly distended. An early bowel obstruction is not excluded. The appendix is not identified. The urinary bladder is unremarkable. There is no fluid or adenopathy.  Postoperative changes of posterior fixation of L4-S1 are noted. There is endplate irregularity at these levels. There is possible discitis/osteomyelitis at the L4-5 and L5-S1 levels.    Impression: Right inguinal hernia containing bowel that is mildly distended. Early bowel obstruction is not excluded. Possible discitis/osteomyelitis of the L4-5 and L5-S1 levels. The ordering provider's office was notified at  3/19/2025 4:53 PM Images reviewed, interpreted, and dictated by Dr. SEFERINO Jacobo. Transcribed by Anabella Vergara PA-C.          Assessment & Plan   Assessment & Plan       SBO (small bowel obstruction)    Type 2 diabetes mellitus with complication, without long-term current use of insulin    Essential hypertension    Anemia    Chronic pain disorder    Depressive disorder    Gastroesophageal reflux disease without esophagitis    Hyperlipidemia    GERD (gastroesophageal reflux disease)    History of DVT (deep vein thrombosis)      63 yo male here with SBO and hernia    SBO  Incarcerated Right Inguinal hernia  - s/p reduction under under sedation in ED  - patient discussed with [facility] surgery. Will consult in am  - NPO  - IVF  - pain and nausea control  - consider NG placement  - am labs    Diabetic Foot Ulcer  - currently on doxycycline. Continue  - wound care    DM2  - hold oral hypoglycemics  - SSI with scheduled accu checks  - check hb A1c    Anemia  -  H/H stable.  - monitor    HTN  HLD  - continue Clonidine, Hydralazine,and metoprolol  - continue statin    GERD  - PPI    History of DVT  PVD  - on ASA and Xarelto. Holding for now.  - resume once cleared by general surgery    DVT prophylaxis:  mechanical    CODE STATUS:  full code  Code Status (Patient has no pulse and is not breathing): CPR (Attempt to Resuscitate)  Medical Interventions (Patient has pulse or is breathing): Full Support  Level Of Support Discussed With: Patient      Expected Discharge  TBD      This note has been completed as part of a split-shared workflow.     Signature: Electronically signed by Shania Mcgill PA-C, 03/20/25, 2:04 AM EDT

## 2025-03-20 NOTE — PROGRESS NOTES
[facility] Medicine Services  PROGRESS NOTE    Patient Name: Geatano Marcelo  : 1960  MRN: [id number]    Date of Admission: 3/19/2025  Primary Care Physician: Tushar Shepherd Jr., MD    Subjective   Subjective     CC:  Abdominal pain    HPI:  Continues to c/o abdominal pain.  States that he had a small BM when first got to ER but no further.  +flatus.  Denies n/v.       Objective   Objective     Vital Signs:   Temp:  [98.2 °F (36.8 °C)] 98.2 °F (36.8 °C)  Heart Rate:  [67-81] 72  Resp:  [14-20] 18  BP: ()/(37-93) 112/38     Physical Exam:  Constitutional: No acute distress, awake, alert  HENT: NCAT, mucous membranes moist  Respiratory: Clear to auscultation bilaterally, respiratory effort normal   Cardiovascular: RRR, no murmurs, rubs, or gallops  Gastrointestinal: Positive bowel sounds, soft, TTP diffuse lower abdomen, large reducible hernia right groin  Musculoskeletal: No bilateral ankle edema  Psychiatric: Appropriate affect, cooperative  Neurologic: Oriented x 3, CALDERON speech clear  Skin: No rashes      Results Reviewed:  LAB RESULTS:      Lab 25   WBC 4.39 4.90   HEMOGLOBIN 11.6* 12.7*   HEMATOCRIT 36.8* 40.1   PLATELETS 207 250   NEUTROS ABS 2.36 2.79   IMMATURE GRANS (ABS) 0.01 0.01   LYMPHS ABS 1.43 1.50   MONOS ABS 0.42 0.45   EOS ABS 0.14 0.11   MCV 88.2 88.5   LACTATE  --  1.2         Lab 25   SODIUM 138 137   POTASSIUM 4.5 5.0   CHLORIDE 106 103   CO2 24.0 23.0   ANION GAP 8.0 11.0   BUN 14 17   CREATININE 0.64* 0.92   EGFR 104.4 91.7   GLUCOSE 89 120*   CALCIUM 8.1* 8.3*   MAGNESIUM  --  1.6   HEMOGLOBIN A1C  --  5.60         Lab 25   TOTAL PROTEIN 4.9* 5.6*   ALBUMIN 3.1* 3.5   GLOBULIN 1.8 2.1   ALT (SGPT) 40 50*   AST (SGOT) 45* 68*   BILIRUBIN 0.3 0.2   ALK PHOS 65 70   LIPASE  --  11*                     Brief Urine Lab Results  (Last result in the past 365 days)        Color    Clarity   Blood   Leuk Est   Nitrite   Protein   CREAT   Urine HCG        08/21/24 1216             49.5                 Microbiology Results Abnormal       None            CT Abdomen Pelvis Without Contrast  Result Date: 3/19/2025  CT SCAN OF THE ABDOMEN AND PELVIS WITHOUT CONTRAST     3/19/2025 4:01 PM HISTORY: Acute right groin pain. COMPARISON: January 9, 2012. PROCEDURE: Axial images were obtained from the lung bases to the pubic symphysis by computed tomography. Oral contrast was administered. This study was performed with techniques to keep radiation doses as low as reasonably achievable, (ALARA). Individualized dose reduction techniques using automated exposure control or adjustment of mA and/or kV according  to the patient size were employed. FINDINGS: ABDOMEN: The lung bases are clear. The heart size is normal. The patient is status post gastric bypass surgery. The limited non-contrast images of the liver are unremarkable. The gallbladder is absent. The spleen is unremarkable. No adrenal masses are seen. The aorta is normal in caliber. There is no significant free fluid or adenopathy.  There is no nephrolithiasis. There is no hydronephrosis. A ventral hernia is noted left of the umbilicus containing unobstructed bowel. PELVIS:  A right inguinal hernia is noted containing bowel that is mildly distended. An early bowel obstruction is not excluded. The appendix is not identified. The urinary bladder is unremarkable. There is no fluid or adenopathy.  Postoperative changes of posterior fixation of L4-S1 are noted. There is endplate irregularity at these levels. There is possible discitis/osteomyelitis at the L4-5 and L5-S1 levels.    Impression: Right inguinal hernia containing bowel that is mildly distended. Early bowel obstruction is not excluded. Possible discitis/osteomyelitis of the L4-5 and L5-S1 levels. The ordering provider's office was notified at  3/19/2025 4:53 PM Images reviewed, interpreted, and  dictated by Dr. SEFERINO Jacobo. Transcribed by Anabella Vergara PA-C.          Current medications:  Scheduled Meds:amitriptyline, 25 mg, Oral, Nightly  atorvastatin, 20 mg, Oral, Daily  citalopram, 20 mg, Oral, Daily  cloNIDine, 0.1 mg, Oral, Q12H  doxycycline, 100 mg, Oral, Q12H  gabapentin, 400 mg, Oral, BID  hydrALAZINE, 25 mg, Oral, BID  insulin lispro, 2-7 Units, Subcutaneous, 4x Daily AC & at Bedtime  lansoprazole, 30 mg, Nasogastric, Q AM  metoprolol succinate XL, 100 mg, Oral, Daily  oxybutynin XL, 5 mg, Oral, Daily  sodium chloride, 10 mL, Intravenous, Q12H      Continuous Infusions:   PRN Meds:.  acetaminophen **OR** acetaminophen **OR** acetaminophen    dextrose    dextrose    glucagon (human recombinant)    HYDROcodone-acetaminophen    HYDROmorphone    melatonin    ondansetron    [COMPLETED] Insert Peripheral IV **AND** sodium chloride    sodium chloride    sodium chloride    Assessment & Plan   Assessment & Plan     Active Hospital Problems    Diagnosis  POA    **SBO (small bowel obstruction) [K56.609]  Yes    Anemia [D64.9]  Yes    Diabetic foot ulcer [E11.621, L97.509]  Yes    History of DVT (deep vein thrombosis) [Z86.718]  Not Applicable    GERD (gastroesophageal reflux disease) [K21.9]  Yes    Type 2 diabetes mellitus with complication, without long-term current use of insulin [E11.8]  Yes    Depressive disorder [F32.A]  Yes    Chronic pain disorder [G89.4]  Yes    Essential hypertension [I10]  Yes    Gastroesophageal reflux disease without esophagitis [K21.9]  Yes    Hyperlipidemia [E78.5]  Yes      Resolved Hospital Problems   No resolved problems to display.        Brief Hospital Course to date:  Gaetano Marcelo is a 64 y.o. male     SBO  Incarcerated Right Inguinal hernia  - s/p reduction under under sedation in ED  - surgery consulted  - NPO  - IVF  - pain and nausea control  - consider NG placement if n/v  - had a small BM on admit, no further  Some +flatus       Diabetic Foot Ulcer  -  currently on doxycycline. Continue  - wound care     DM2  - hold oral hypoglycemics  - SSI with scheduled accu checks  - hb A1c 5.60     Anemia  - H/H stable.  - monitor     HTN  HLD  - BP borderline so hold Clonidine and Hydralazine for now.  Continue metoprolol  - continue statin     GERD  - PPI     History of DVT  PVD  - on ASA and Xarelto. Holding for now.  - resume once cleared by general surgery        Expected Discharge Location and Transportation:   Expected Discharge   Expected Discharge Date: 3/22/2025; Expected Discharge Time:      VTE Prophylaxis:  Mechanical VTE prophylaxis orders are present.         AM-PAC 6 Clicks Score (PT): 24 (03/20/25 1109)    CODE STATUS:   Code Status and Medical Interventions: CPR (Attempt to Resuscitate); Full Support   Ordered at: 03/20/25 0040     Code Status (Patient has no pulse and is not breathing):    CPR (Attempt to Resuscitate)     Medical Interventions (Patient has pulse or is breathing):    Full Support     Level Of Support Discussed With:    Patient       Cash Chin MD  03/20/25

## 2025-03-21 ENCOUNTER — ANESTHESIA EVENT (OUTPATIENT)
Dept: PERIOP | Facility: HOSPITAL | Age: 65
End: 2025-03-21
Payer: MEDICARE

## 2025-03-21 ENCOUNTER — ANESTHESIA (OUTPATIENT)
Dept: PERIOP | Facility: HOSPITAL | Age: 65
End: 2025-03-21
Payer: MEDICARE

## 2025-03-21 ENCOUNTER — ANESTHESIA EVENT CONVERTED (OUTPATIENT)
Dept: ANESTHESIOLOGY | Facility: HOSPITAL | Age: 65
DRG: 351 | End: 2025-03-21
Payer: MEDICARE

## 2025-03-21 LAB
ALBUMIN SERPL-MCNC: 3 G/DL (ref 3.5–5.2)
ALBUMIN/GLOB SERPL: 1.7 G/DL
ALP SERPL-CCNC: 65 U/L (ref 39–117)
ALT SERPL W P-5'-P-CCNC: 40 U/L (ref 1–41)
ANION GAP SERPL CALCULATED.3IONS-SCNC: 10 MMOL/L (ref 5–15)
AST SERPL-CCNC: 38 U/L (ref 1–40)
BILIRUB SERPL-MCNC: 0.3 MG/DL (ref 0–1.2)
BUN SERPL-MCNC: 8 MG/DL (ref 8–23)
BUN/CREAT SERPL: 11.9 (ref 7–25)
CA-I SERPL ISE-MCNC: 1.12 MMOL/L (ref 1.15–1.3)
CALCIUM SPEC-SCNC: 8.1 MG/DL (ref 8.6–10.5)
CHLORIDE SERPL-SCNC: 107 MMOL/L (ref 98–107)
CO2 SERPL-SCNC: 22 MMOL/L (ref 22–29)
CREAT SERPL-MCNC: 0.67 MG/DL (ref 0.76–1.27)
CRP SERPL-MCNC: <0.3 MG/DL (ref 0–0.5)
DEPRECATED RDW RBC AUTO: 46.7 FL (ref 37–54)
EGFRCR SERPLBLD CKD-EPI 2021: 103 ML/MIN/1.73
ERYTHROCYTE [DISTWIDTH] IN BLOOD BY AUTOMATED COUNT: 14.5 % (ref 12.3–15.4)
GLOBULIN UR ELPH-MCNC: 1.8 GM/DL
GLUCOSE BLDC GLUCOMTR-MCNC: 107 MG/DL (ref 70–130)
GLUCOSE BLDC GLUCOMTR-MCNC: 76 MG/DL (ref 70–130)
GLUCOSE BLDC GLUCOMTR-MCNC: 84 MG/DL (ref 70–130)
GLUCOSE SERPL-MCNC: 88 MG/DL (ref 65–99)
HCT VFR BLD AUTO: 39.7 % (ref 37.5–51)
HGB BLD-MCNC: 12.3 G/DL (ref 13–17.7)
MAGNESIUM SERPL-MCNC: 1.7 MG/DL (ref 1.6–2.4)
MCH RBC QN AUTO: 27.3 PG (ref 26.6–33)
MCHC RBC AUTO-ENTMCNC: 31 G/DL (ref 31.5–35.7)
MCV RBC AUTO: 88 FL (ref 79–97)
PHOSPHATE SERPL-MCNC: 2.9 MG/DL (ref 2.5–4.5)
PLATELET # BLD AUTO: 224 10*3/MM3 (ref 140–450)
PMV BLD AUTO: 9.5 FL (ref 6–12)
POTASSIUM SERPL-SCNC: 4.1 MMOL/L (ref 3.5–5.2)
PROT SERPL-MCNC: 4.8 G/DL (ref 6–8.5)
RBC # BLD AUTO: 4.51 10*6/MM3 (ref 4.14–5.8)
SODIUM SERPL-SCNC: 139 MMOL/L (ref 136–145)
WBC NRBC COR # BLD AUTO: 3.48 10*3/MM3 (ref 3.4–10.8)

## 2025-03-21 PROCEDURE — 84100 ASSAY OF PHOSPHORUS: CPT | Performed by: INTERNAL MEDICINE

## 2025-03-21 PROCEDURE — 25010000002 DEXAMETHASONE SODIUM PHOSPHATE 10 MG/ML SOLUTION: Performed by: ANESTHESIOLOGY

## 2025-03-21 PROCEDURE — 25010000002 SUGAMMADEX 200 MG/2ML SOLUTION: Performed by: ANESTHESIOLOGY

## 2025-03-21 PROCEDURE — 25010000002 PHENYLEPHRINE 10 MG/ML SOLUTION: Performed by: ANESTHESIOLOGY

## 2025-03-21 PROCEDURE — 97166 OT EVAL MOD COMPLEX 45 MIN: CPT

## 2025-03-21 PROCEDURE — 93010 ELECTROCARDIOGRAM REPORT: CPT | Performed by: INTERNAL MEDICINE

## 2025-03-21 PROCEDURE — 93005 ELECTROCARDIOGRAM TRACING: CPT | Performed by: ANESTHESIOLOGY

## 2025-03-21 PROCEDURE — 99232 SBSQ HOSP IP/OBS MODERATE 35: CPT | Performed by: INTERNAL MEDICINE

## 2025-03-21 PROCEDURE — 80053 COMPREHEN METABOLIC PANEL: CPT | Performed by: SURGERY

## 2025-03-21 PROCEDURE — 25010000002 FENTANYL CITRATE (PF) 100 MCG/2ML SOLUTION: Performed by: ANESTHESIOLOGY

## 2025-03-21 PROCEDURE — 25810000003 SODIUM CHLORIDE 0.9 % SOLUTION: Performed by: INTERNAL MEDICINE

## 2025-03-21 PROCEDURE — 97161 PT EVAL LOW COMPLEX 20 MIN: CPT

## 2025-03-21 PROCEDURE — 25010000002 BUPIVACAINE (PF) 0.25 % SOLUTION: Performed by: ANESTHESIOLOGY

## 2025-03-21 PROCEDURE — 25810000003 SODIUM CHLORIDE 0.9 % SOLUTION: Performed by: SURGERY

## 2025-03-21 PROCEDURE — 25010000002 HYDROMORPHONE 1 MG/ML SOLUTION

## 2025-03-21 PROCEDURE — 88302 TISSUE EXAM BY PATHOLOGIST: CPT | Performed by: SURGERY

## 2025-03-21 PROCEDURE — 25010000002 HYDROMORPHONE PER 4 MG: Performed by: FAMILY MEDICINE

## 2025-03-21 PROCEDURE — 25010000002 PROPOFOL 10 MG/ML EMULSION: Performed by: NURSE ANESTHETIST, CERTIFIED REGISTERED

## 2025-03-21 PROCEDURE — 25810000003 LACTATED RINGERS PER 1000 ML: Performed by: NURSE ANESTHETIST, CERTIFIED REGISTERED

## 2025-03-21 PROCEDURE — 25010000002 LIDOCAINE PF 1% 1 % SOLUTION: Performed by: NURSE ANESTHETIST, CERTIFIED REGISTERED

## 2025-03-21 PROCEDURE — 87040 BLOOD CULTURE FOR BACTERIA: CPT | Performed by: SURGERY

## 2025-03-21 PROCEDURE — 25810000003 LACTATED RINGERS PER 1000 ML: Performed by: ANESTHESIOLOGY

## 2025-03-21 PROCEDURE — C1781 MESH (IMPLANTABLE): HCPCS | Performed by: SURGERY

## 2025-03-21 PROCEDURE — 83735 ASSAY OF MAGNESIUM: CPT | Performed by: INTERNAL MEDICINE

## 2025-03-21 PROCEDURE — 86140 C-REACTIVE PROTEIN: CPT | Performed by: INTERNAL MEDICINE

## 2025-03-21 PROCEDURE — 82948 REAGENT STRIP/BLOOD GLUCOSE: CPT

## 2025-03-21 PROCEDURE — 0YU50JZ SUPPLEMENT RIGHT INGUINAL REGION WITH SYNTHETIC SUBSTITUTE, OPEN APPROACH: ICD-10-PCS | Performed by: SURGERY

## 2025-03-21 PROCEDURE — 25010000002 CEFAZOLIN PER 500 MG: Performed by: SURGERY

## 2025-03-21 PROCEDURE — 82330 ASSAY OF CALCIUM: CPT | Performed by: INTERNAL MEDICINE

## 2025-03-21 PROCEDURE — 85027 COMPLETE CBC AUTOMATED: CPT | Performed by: INTERNAL MEDICINE

## 2025-03-21 PROCEDURE — 25010000002 ONDANSETRON PER 1 MG: Performed by: ANESTHESIOLOGY

## 2025-03-21 DEVICE — MESH FLUT SHT 3X6IN: Type: IMPLANTABLE DEVICE | Site: INGUINAL | Status: FUNCTIONAL

## 2025-03-21 RX ORDER — ONDANSETRON 2 MG/ML
4 INJECTION INTRAMUSCULAR; INTRAVENOUS ONCE AS NEEDED
Status: DISCONTINUED | OUTPATIENT
Start: 2025-03-21 | End: 2025-03-21 | Stop reason: HOSPADM

## 2025-03-21 RX ORDER — BUPIVACAINE HYDROCHLORIDE 2.5 MG/ML
INJECTION, SOLUTION EPIDURAL; INFILTRATION; INTRACAUDAL
Status: COMPLETED | OUTPATIENT
Start: 2025-03-21 | End: 2025-03-21

## 2025-03-21 RX ORDER — FAMOTIDINE 10 MG/ML
20 INJECTION, SOLUTION INTRAVENOUS EVERY 12 HOURS SCHEDULED
Status: COMPLETED | OUTPATIENT
Start: 2025-03-21 | End: 2025-03-21

## 2025-03-21 RX ORDER — HYDROMORPHONE HYDROCHLORIDE 1 MG/ML
0.5 INJECTION, SOLUTION INTRAMUSCULAR; INTRAVENOUS; SUBCUTANEOUS ONCE AS NEEDED
Status: COMPLETED | OUTPATIENT
Start: 2025-03-21 | End: 2025-03-22

## 2025-03-21 RX ORDER — ROCURONIUM BROMIDE 10 MG/ML
INJECTION, SOLUTION INTRAVENOUS AS NEEDED
Status: DISCONTINUED | OUTPATIENT
Start: 2025-03-21 | End: 2025-03-21 | Stop reason: SURG

## 2025-03-21 RX ORDER — SODIUM CHLORIDE, SODIUM LACTATE, POTASSIUM CHLORIDE, CALCIUM CHLORIDE 600; 310; 30; 20 MG/100ML; MG/100ML; MG/100ML; MG/100ML
9 INJECTION, SOLUTION INTRAVENOUS ONCE
Status: COMPLETED | OUTPATIENT
Start: 2025-03-21 | End: 2025-03-21

## 2025-03-21 RX ORDER — HYDROMORPHONE HYDROCHLORIDE 1 MG/ML
0.5 INJECTION, SOLUTION INTRAMUSCULAR; INTRAVENOUS; SUBCUTANEOUS
Status: DISCONTINUED | OUTPATIENT
Start: 2025-03-21 | End: 2025-03-21 | Stop reason: HOSPADM

## 2025-03-21 RX ORDER — SUCCINYLCHOLINE/SOD CL,ISO/PF 200MG/10ML
SYRINGE (ML) INTRAVENOUS AS NEEDED
Status: DISCONTINUED | OUTPATIENT
Start: 2025-03-21 | End: 2025-03-21 | Stop reason: SURG

## 2025-03-21 RX ORDER — DEXAMETHASONE SODIUM PHOSPHATE 10 MG/ML
INJECTION, SOLUTION INTRAMUSCULAR; INTRAVENOUS
Status: COMPLETED | OUTPATIENT
Start: 2025-03-21 | End: 2025-03-21

## 2025-03-21 RX ORDER — ACETAMINOPHEN 500 MG
500 TABLET ORAL EVERY 6 HOURS
Status: DISCONTINUED | OUTPATIENT
Start: 2025-03-21 | End: 2025-03-24 | Stop reason: HOSPADM

## 2025-03-21 RX ORDER — TEMAZEPAM 15 MG/1
15 CAPSULE ORAL NIGHTLY PRN
Status: DISCONTINUED | OUTPATIENT
Start: 2025-03-21 | End: 2025-03-24 | Stop reason: HOSPADM

## 2025-03-21 RX ORDER — SODIUM CHLORIDE, SODIUM LACTATE, POTASSIUM CHLORIDE, CALCIUM CHLORIDE 600; 310; 30; 20 MG/100ML; MG/100ML; MG/100ML; MG/100ML
INJECTION, SOLUTION INTRAVENOUS CONTINUOUS PRN
Status: DISCONTINUED | OUTPATIENT
Start: 2025-03-21 | End: 2025-03-21 | Stop reason: SURG

## 2025-03-21 RX ORDER — LIDOCAINE HYDROCHLORIDE 10 MG/ML
INJECTION, SOLUTION EPIDURAL; INFILTRATION; INTRACAUDAL; PERINEURAL AS NEEDED
Status: DISCONTINUED | OUTPATIENT
Start: 2025-03-21 | End: 2025-03-21 | Stop reason: SURG

## 2025-03-21 RX ORDER — MICONAZOLE NITRATE 20 MG/G
1 CREAM TOPICAL EVERY 12 HOURS SCHEDULED
Status: DISCONTINUED | OUTPATIENT
Start: 2025-03-22 | End: 2025-03-24 | Stop reason: HOSPADM

## 2025-03-21 RX ORDER — HYDROCODONE BITARTRATE AND ACETAMINOPHEN 5; 325 MG/1; MG/1
1 TABLET ORAL EVERY 6 HOURS PRN
Status: DISCONTINUED | OUTPATIENT
Start: 2025-03-21 | End: 2025-03-24 | Stop reason: HOSPADM

## 2025-03-21 RX ORDER — ONDANSETRON 2 MG/ML
INJECTION INTRAMUSCULAR; INTRAVENOUS AS NEEDED
Status: DISCONTINUED | OUTPATIENT
Start: 2025-03-21 | End: 2025-03-21 | Stop reason: SURG

## 2025-03-21 RX ORDER — PROPOFOL 10 MG/ML
VIAL (ML) INTRAVENOUS AS NEEDED
Status: DISCONTINUED | OUTPATIENT
Start: 2025-03-21 | End: 2025-03-21 | Stop reason: SURG

## 2025-03-21 RX ORDER — IPRATROPIUM BROMIDE AND ALBUTEROL SULFATE 2.5; .5 MG/3ML; MG/3ML
3 SOLUTION RESPIRATORY (INHALATION) ONCE AS NEEDED
Status: DISCONTINUED | OUTPATIENT
Start: 2025-03-21 | End: 2025-03-21 | Stop reason: HOSPADM

## 2025-03-21 RX ORDER — FAMOTIDINE 20 MG/1
20 TABLET, FILM COATED ORAL EVERY 12 HOURS SCHEDULED
Status: COMPLETED | OUTPATIENT
Start: 2025-03-21 | End: 2025-03-21

## 2025-03-21 RX ORDER — FENTANYL CITRATE 50 UG/ML
50 INJECTION, SOLUTION INTRAMUSCULAR; INTRAVENOUS
Status: DISCONTINUED | OUTPATIENT
Start: 2025-03-21 | End: 2025-03-21 | Stop reason: HOSPADM

## 2025-03-21 RX ORDER — FENTANYL CITRATE 50 UG/ML
INJECTION, SOLUTION INTRAMUSCULAR; INTRAVENOUS AS NEEDED
Status: DISCONTINUED | OUTPATIENT
Start: 2025-03-21 | End: 2025-03-21 | Stop reason: SURG

## 2025-03-21 RX ORDER — PHENYLEPHRINE HYDROCHLORIDE 10 MG/ML
INJECTION INTRAVENOUS AS NEEDED
Status: DISCONTINUED | OUTPATIENT
Start: 2025-03-21 | End: 2025-03-21 | Stop reason: SURG

## 2025-03-21 RX ADMIN — SODIUM CHLORIDE 100 ML/HR: 0.9 INJECTION, SOLUTION INTRAVENOUS at 23:10

## 2025-03-21 RX ADMIN — HYDROMORPHONE HYDROCHLORIDE 0.5 MG: 1 INJECTION, SOLUTION INTRAMUSCULAR; INTRAVENOUS; SUBCUTANEOUS at 19:13

## 2025-03-21 RX ADMIN — Medication 10 ML: at 21:01

## 2025-03-21 RX ADMIN — HYDROMORPHONE HYDROCHLORIDE 0.5 MG: 1 INJECTION, SOLUTION INTRAMUSCULAR; INTRAVENOUS; SUBCUTANEOUS at 04:00

## 2025-03-21 RX ADMIN — GABAPENTIN 400 MG: 400 CAPSULE ORAL at 09:25

## 2025-03-21 RX ADMIN — DEXAMETHASONE SODIUM PHOSPHATE 8 MG: 10 INJECTION INTRAMUSCULAR; INTRAVENOUS at 16:20

## 2025-03-21 RX ADMIN — SUGAMMADEX 200 MG: 100 INJECTION, SOLUTION INTRAVENOUS at 18:40

## 2025-03-21 RX ADMIN — OXYBUTYNIN CHLORIDE 5 MG: 5 TABLET, EXTENDED RELEASE ORAL at 10:04

## 2025-03-21 RX ADMIN — BUPIVACAINE HYDROCHLORIDE 30 ML: 2.5 INJECTION, SOLUTION EPIDURAL; INFILTRATION; INTRACAUDAL; PERINEURAL at 16:23

## 2025-03-21 RX ADMIN — FAMOTIDINE 20 MG: 10 INJECTION, SOLUTION INTRAVENOUS at 15:23

## 2025-03-21 RX ADMIN — ONDANSETRON 4 MG: 2 INJECTION INTRAMUSCULAR; INTRAVENOUS at 18:27

## 2025-03-21 RX ADMIN — FENTANYL CITRATE 50 MCG: 50 INJECTION, SOLUTION INTRAMUSCULAR; INTRAVENOUS at 18:36

## 2025-03-21 RX ADMIN — FENTANYL CITRATE 50 MCG: 50 INJECTION, SOLUTION INTRAMUSCULAR; INTRAVENOUS at 18:40

## 2025-03-21 RX ADMIN — GABAPENTIN 400 MG: 400 CAPSULE ORAL at 21:00

## 2025-03-21 RX ADMIN — HYDROCODONE BITARTRATE AND ACETAMINOPHEN 1 TABLET: 5; 325 TABLET ORAL at 21:00

## 2025-03-21 RX ADMIN — PHENYLEPHRINE HYDROCHLORIDE 200 MCG: 10 INJECTION INTRAVENOUS at 17:43

## 2025-03-21 RX ADMIN — SODIUM CHLORIDE, SODIUM LACTATE, POTASSIUM CHLORIDE, CALCIUM CHLORIDE 9 ML/HR: 20; 30; 600; 310 INJECTION, SOLUTION INTRAVENOUS at 15:24

## 2025-03-21 RX ADMIN — SODIUM CHLORIDE 100 ML/HR: 0.9 INJECTION, SOLUTION INTRAVENOUS at 06:58

## 2025-03-21 RX ADMIN — PROPOFOL 200 MG: 10 INJECTION, EMULSION INTRAVENOUS at 16:17

## 2025-03-21 RX ADMIN — ACETAMINOPHEN 500 MG: 500 TABLET, FILM COATED ORAL at 21:01

## 2025-03-21 RX ADMIN — ROCURONIUM 50 MG: 50 INJECTION, SOLUTION INTRAVENOUS at 16:33

## 2025-03-21 RX ADMIN — ATORVASTATIN CALCIUM 20 MG: 20 TABLET, FILM COATED ORAL at 09:25

## 2025-03-21 RX ADMIN — AMITRIPTYLINE HYDROCHLORIDE 25 MG: 25 TABLET, FILM COATED ORAL at 21:01

## 2025-03-21 RX ADMIN — Medication 10 ML: at 09:25

## 2025-03-21 RX ADMIN — SODIUM CHLORIDE, POTASSIUM CHLORIDE, SODIUM LACTATE AND CALCIUM CHLORIDE: 600; 310; 30; 20 INJECTION, SOLUTION INTRAVENOUS at 16:13

## 2025-03-21 RX ADMIN — CITALOPRAM HYDROBROMIDE 20 MG: 20 TABLET ORAL at 09:25

## 2025-03-21 RX ADMIN — DOXYCYCLINE 100 MG: 100 CAPSULE ORAL at 09:25

## 2025-03-21 RX ADMIN — Medication 200 MG: at 16:17

## 2025-03-21 RX ADMIN — TEMAZEPAM 15 MG: 15 CAPSULE ORAL at 22:43

## 2025-03-21 RX ADMIN — DOXYCYCLINE 100 MG: 100 CAPSULE ORAL at 21:00

## 2025-03-21 RX ADMIN — HYDROCODONE BITARTRATE AND ACETAMINOPHEN 1 TABLET: 5; 325 TABLET ORAL at 10:04

## 2025-03-21 RX ADMIN — ROCURONIUM 10 MG: 50 INJECTION, SOLUTION INTRAVENOUS at 18:03

## 2025-03-21 RX ADMIN — METOPROLOL SUCCINATE 100 MG: 100 TABLET, EXTENDED RELEASE ORAL at 09:24

## 2025-03-21 RX ADMIN — SODIUM CHLORIDE 2000 MG: 900 INJECTION INTRAVENOUS at 16:28

## 2025-03-21 RX ADMIN — DEXAMETHASONE SODIUM PHOSPHATE 2 MG: 10 INJECTION INTRAMUSCULAR; INTRAVENOUS at 16:23

## 2025-03-21 RX ADMIN — LIDOCAINE HYDROCHLORIDE 50 MG: 10 INJECTION, SOLUTION EPIDURAL; INFILTRATION; INTRACAUDAL; PERINEURAL at 16:17

## 2025-03-21 RX ADMIN — ROCURONIUM 10 MG: 50 INJECTION, SOLUTION INTRAVENOUS at 17:26

## 2025-03-21 RX ADMIN — LANSOPRAZOLE 30 MG: 15 TABLET, ORALLY DISINTEGRATING ORAL at 09:25

## 2025-03-21 RX ADMIN — Medication 1 TABLET: at 09:24

## 2025-03-21 NOTE — ANESTHESIA PROCEDURE NOTES
"Peripheral Block      Patient reassessed immediately prior to procedure    Patient location during procedure: OR  Reason for block: at surgeon's request and post-op pain management  Performed by  RADHA/CAA: Lei Hugo CRNA  Preanesthetic Checklist  Completed: patient identified, IV checked, site marked, risks and benefits discussed, surgical consent, monitors and equipment checked, pre-op evaluation and timeout performed  Prep:  Pt Position: supine  Sterile barriers:cap, gloves, mask and washed/disinfected hands  Prep: ChloraPrep  Patient monitoring: blood pressure monitoring, continuous pulse oximetry and EKG  Procedure    Sedation: yes  Performed under: general  Guidance:ultrasound guided  Images:still images obtained, printed/placed on chart    Laterality:right  Block Type:TAP  Injection Technique:single-shot  Needle Type:short-bevel and echogenic  Needle Gauge:20 G  Resistance on Injection: none    Medications Used: dexamethasone sodium phosphate injection - Injection   2 mg - 3/21/2025 4:23:00 PM  bupivacaine PF (MARCAINE) 0.25 % injection - Injection   30 mL - 3/21/2025 4:23:00 PM      Medications  Comment:        Post Assessment  Injection Assessment: negative aspiration for heme, incremental injection and no paresthesia on injection  Patient Tolerance:comfortable throughout block  Complications:no  Additional Notes    Mid-Axillary/Lateral TAPs    A high-frequency linear transducer, with sterile cover, was placed in the midaxillary line between the ASIS and costal margin. The External Oblique Muscle (EOM), Internal Oblique Muscle (IOM), Transverse Abdominus Muscle (MARTINS), and Peritoneum were identified. The insertion site was prepped in sterile fashion and then localized with 2-5 ml of 1% Lidocaine. Using ultrasound-guidance, a 20-gauge B-Hart 4\" Ultraplex 360 non-stimulating echogenic needle was advanced in plane, from medial to lateral, until the tip of the needle was in the fascial plane between the IOM " and MARTINS. 1-3ml of preservative free normal saline was used to hydro-dissect the fascial planes. After the fascial plane was verified, the local anesthetic (LA) was injected. The procedure was repeated on the opposite side for bilateral coverage. Aspiration every 5 ml to prevent intravascular injection. Injection was completed with negative aspiration of blood and negative intravascular injection. Injection pressures were normal with minimal resistance. Midaxillary TAPs should reach intercostal nerves T10- T11 and the subcostal nerve T12.    Performed by: Brendan Gee Jr., MD

## 2025-03-21 NOTE — NURSING NOTE
WOC consulted for wound to the right foot.    Patient has a chronic diabetic ulcer to the right foot, plantar surface.  Patient follows with Dr. Urbina for care of this wound.  He recently saw Dr. Urbina and received wound care on the ulceration.  Patient was instructed to cover the wound with Betadine soaked 4 x 4 gauze and then wrapped with Kerlix.  Will replicate these orders to replicate recommendations per patient's physician.    Pressure injury prevention protocol.    WOC will sign off.

## 2025-03-21 NOTE — THERAPY EVALUATION
Patient Name: Gaetano Marcelo  : 1960    MRN: [id number]                              Today's Date: 3/21/2025       Admit Date: 3/19/2025    Visit Dx:     ICD-10-CM ICD-9-CM   1. Incarcerated right inguinal hernia  K40.30 550.10   2. Right lower quadrant abdominal pain  R10.31 789.03     Patient Active Problem List   Diagnosis    Type 2 diabetes mellitus with complication, without long-term current use of insulin    Chronic pain disorder    Depressive disorder    Morbid obesity    Gastroesophageal reflux disease without esophagitis    Hyperlipidemia    Essential hypertension    Insomnia    Low back pain    GERD (gastroesophageal reflux disease)    Hypertension    Osteoarthritis    Pain, phantom limb    Peripheral edema    History of DVT (deep vein thrombosis)    Elevated LFTs    Diarrhea    Dyspnea    SBO (small bowel obstruction)    Anemia    Diabetic foot ulcer     Past Medical History:   Diagnosis Date    GERD (gastroesophageal reflux disease)     Hyperlipidemia     Hypertension     Insomnia     Low back pain     Osteoarthritis     Pain, phantom limb     Peripheral edema     Type 2 diabetes mellitus      Past Surgical History:   Procedure Laterality Date    APPENDECTOMY      CARPAL TUNNEL RELEASE Left 2020    Dr. Yosef Diaz    CHOLECYSTECTOMY      HERNIA REPAIR      REPLACEMENT TOTAL KNEE Right     KANWAL-EN-Y PROCEDURE  2004    TOE AMPUTATION Right     1st, 2nd Rt toes    ULNAR NERVE DECOMPRESSION Left 2020    Dr. Yosef Diaz      General Information       Row Name 25 1459          Physical Therapy Time and Intention    Document Type evaluation  -AE     Mode of Treatment physical therapy  -AE       Row Name 25 4585          General Information    Patient Profile Reviewed yes  -AE     Existing Precautions/Restrictions fall  RLE heel WB for transfers, and NWB for ambulation per pt  -AE     Barriers to Rehab medically complex  -AE       Row Name 25 3056          Living  Environment    Current Living Arrangements home  -AE     People in Home spouse  -AE       Row Name 03/21/25 1453          Home Main Entrance    Number of Stairs, Main Entrance none  -AE     Stair Railings, Main Entrance none  -AE       Row Name 03/21/25 1453          Stairs Within Home, Primary    Number of Stairs, Within Home, Primary none  -AE     Stair Railings, Within Home, Primary none  -AE       Row Name 03/21/25 1453          Cognition    Orientation Status (Cognition) oriented x 4  -AE       Row Name 03/21/25 1453          Safety Issues/Impairments Affecting Functional Mobility    Safety Issues Affecting Function (Mobility) awareness of need for assistance;insight into deficits/self-awareness;safety precaution awareness;safety precautions follow-through/compliance;sequencing abilities  Required multiple attempts to maintain proper heel WB  -AE     Impairments Affecting Function (Mobility) balance;endurance/activity tolerance;strength;pain  -AE               User Key  (r) = Recorded By, (t) = Taken By, (c) = Cosigned By      Initials Name Provider Type    AE Johnny Wilkinson, PT Physical Therapist                   Mobility       Row Name 03/21/25 4525          Bed Mobility    Bed Mobility supine-sit;sit-supine  -AE     Supine-Sit [place] (Bed Mobility) standby assist  -AE     Sit-Supine [place] (Bed Mobility) standby assist  -AE     Assistive Device (Bed Mobility) head of bed elevated;bed rails  -AE     Comment, (Bed Mobility) VCs for hand placement and sequencing.  -AE       Row Name 03/21/25 0289          Transfers    Comment, (Transfers) VCs for hand placement and sequencing. Pt required increased cues to improve sequencing with heel WB of RLE while completing STS.  -AE       Row Name 03/21/25 3184          Sit-Stand Transfer    Sit-Stand [place] (Transfers) contact guard;verbal cues  -AE     Assistive Device (Sit-Stand Transfers) walker, knee scooter  -AE       Row Name 03/21/25 3155           Gait/Stairs (Locomotion)    [place] Level (Gait) contact guard;verbal cues  -AE     Assistive Device (Gait) walker, knee scooter  -AE     Distance in Feet (Gait) 200  -AE     Deviations/Abnormal Patterns (Gait) bilateral deviations;diann decreased;gait speed decreased;stride length decreased  -AE     Bilateral Gait Deviations forward flexed posture;heel strike decreased  -AE     Comment, (Gait/Stairs) Pt demo step through gait pattern with slowed diann and decreased gait speed. Pt demo good safety awareness and sequencing of knee scooter while ambulating this session. Pt requires cues to improve mounting/dismounting knee scooter during STS. Further distance limited by fatigue.  -AE       Row Name 03/21/25 1455          Mobility    Extremity Weight-bearing Status right lower extremity  -AE     Right Lower Extremity (Weight-bearing Status) other (see comments)  heel WB for transfer, NWB for ambulation, per patient  -AE               User Key  (r) = Recorded By, (t) = Taken By, (c) = Cosigned By      Initials Name Provider Type    AE Johnny Wilkinson, PT Physical Therapist                   Obj/Interventions       Row Name 03/21/25 1536          Range of Motion Comprehensive    General Range of Motion bilateral lower extremity ROM WFL  -AE       Row Name 03/21/25 1536          Strength Comprehensive (MMT)    General Manual Muscle Testing (MMT) Assessment lower extremity strength deficits identified  -AE     Comment, General Manual Muscle Testing (MMT) Assessment BLE grossly 4-/5  -AE       Row Name 03/21/25 1536          Balance    Balance Assessment sitting static balance;sitting dynamic balance;standing static balance;standing dynamic balance  -AE     Static Sitting Balance standby assist  -AE     Dynamic Sitting Balance contact guard  -AE     Position, Sitting Balance unsupported;sitting edge of bed  -AE     Static Standing Balance contact guard  -AE     Dynamic Standing Balance contact guard  -AE      Position/Device Used, Standing Balance supported  -AE               User Key  (r) = Recorded By, (t) = Taken By, (c) = Cosigned By      Initials Name Provider Type    AE Johnny Wilkinson PT Physical Therapist                   Goals/Plan       Row Name 03/21/25 1546          Bed Mobility Goal 1 (PT)    Activity/Assistive Device (Bed Mobility Goal 1, PT) sit to supine/supine to sit  -AE     [place] Level/Cues Needed (Bed Mobility Goal 1, PT) modified independence  -AE     Time Frame (Bed Mobility Goal 1, PT) short term goal (STG);5 days  -AE     Progress/Outcomes (Bed Mobility Goal 1, PT) new goal  -AE       Row Name 03/21/25 3090          Transfer Goal 1 (PT)    Activity/Assistive Device (Transfer Goal 1, PT) sit-to-stand/stand-to-sit;bed-to-chair/chair-to-bed  -AE     [place] Level/Cues Needed (Transfer Goal 1, PT) standby assist  -AE     Time Frame (Transfer Goal 1, PT) long term goal (LTG);10 days  -AE     Progress/Outcome (Transfer Goal 1, PT) new goal  -AE       Row Name 03/21/25 7184          Gait Training Goal 1 (PT)    Activity/Assistive Device (Gait Training Goal 1, PT) gait (walking locomotion);assistive device use;other (see comments)  knee scooter  -AE     [place] Level (Gait Training Goal 1, PT) standby assist  -AE     Distance (Gait Training Goal 1, PT) 400ft  -AE     Time Frame (Gait Training Goal 1, PT) long term goal (LTG);10 days  -AE     Progress/Outcome (Gait Training Goal 1, PT) new goal  -AE       Row Name 03/21/25 1651          Therapy Assessment/Plan (PT)    Planned Therapy Interventions (PT) balance training;bed mobility training;gait training;home exercise program;patient/family education;postural re-education;ROM (range of motion);stair training;strengthening;transfer training  -AE               User Key  (r) = Recorded By, (t) = Taken By, (c) = Cosigned By      Initials Name Provider Type    AE Johnny Wilkinson PT Physical Therapist                   Clinical Impression        Row Name 03/21/25 1537          Pain    Pain Location groin  -AE     Pain Side/Orientation right  -AE     Pain Management Interventions activity modification encouraged;exercise or physical activity utilized  -AE     Response to Pain Interventions activity participation with tolerable pain  -AE       Row Name 03/21/25 1537          Pain Scale: FACES Pre/Post-Treatment    Pain: FACES Scale, Pretreatment 2-->hurts little bit  -AE     Posttreatment Pain Rating 2-->hurts little bit  -AE       Row Name 03/21/25 1537          Plan of Care Review    Plan of Care Reviewed With patient;spouse  -AE     Progress no change  -AE     Outcome Evaluation Pt presents with decreased functional mobility and decreased activity tolerance. Pt ambulated 200ft with CGA and use of knee scooter. Recommend continued skilled IP PT interventions. Recommend D/C home with assist and HHPT.  -AE       Row Name 03/21/25 1537          Therapy Assessment/Plan (PT)    Patient/Family Therapy Goals Statement (PT) Home  -AE     Rehab Potential (PT) fair  -AE     Criteria for Skilled Interventions Met (PT) yes  -AE     Therapy Frequency (PT) daily  -AE     Predicted Duration of Therapy Intervention (PT) 2 weeks  -AE       Row Name 03/21/25 1537          Vital Signs    Pre Systolic BP Rehab 142  -AE     Pre Treatment Diastolic   -AE     Pretreatment Heart Rate (beats/min) 87  -AE     Posttreatment Heart Rate (beats/min) 89  -AE     O2 Delivery Pre Treatment room air  -AE     O2 Delivery Intra Treatment room air  -AE     Post SpO2 (%) 97  -AE     O2 Delivery Post Treatment room air  -AE     Pre Patient Position Supine  -AE     Intra Patient Position Standing  -AE     Post Patient Position Supine  -AE       Row Name 03/21/25 1537          Positioning and Restraints    Pre-Treatment Position in bed  -AE     Post Treatment Position bed  -AE     In Bed notified nsg;fowlers;call light within reach;encouraged to call for assist;with  family/caregiver;heels elevated  -AE               User Key  (r) = Recorded By, (t) = Taken By, (c) = Cosigned By      Initials Name Provider Type    AE Johnny Wilkinson, PT Physical Therapist                   Outcome Measures       Row Name 03/21/25 1546          How much help from another person do you currently need...    Turning from your back to your side while in flat bed without using bedrails? 4  -AE     Moving from lying on back to sitting on the side of a flat bed without bedrails? 3  -AE     Moving to and from a bed to a chair (including a wheelchair)? 3  -AE     Standing up from a chair using your arms (e.g., wheelchair, bedside chair)? 3  -AE     Climbing 3-5 steps with a railing? 2  -AE     To walk in hospital room? 3  -AE     AM-PAC 6 Clicks Score (PT) 18  -AE     Highest Level of Mobility Goal 6 --> Walk 10 steps or more  -AE       Row Name 03/21/25 1546 03/21/25 1352       Functional Assessment    Outcome Measure Options AM-PAC 6 Clicks Basic Mobility (PT)  -AE AM-PAC 6 Clicks Daily Activity (OT)  -AC              User Key  (r) = Recorded By, (t) = Taken By, (c) = Cosigned By      Initials Name Provider Type    Kamini Rouse, OT Occupational Therapist    Johnny Sanon, PT Physical Therapist                                 Physical Therapy Education       Title: PT OT SLP Therapies (In Progress)       Topic: Physical Therapy (In Progress)       Point: Mobility training (In Progress)       Learning Progress Summary            Patient Acceptance, E, NR by AE at 3/21/2025 1112                      Point: Home exercise program (Not Started)       Learner Progress:  Not documented in this visit.              Point: Body mechanics (In Progress)       Learning Progress Summary            Patient Acceptance, E, NR by AE at 3/21/2025 1112                      Point: Precautions (In Progress)       Learning Progress Summary            Patient Acceptance, E, NR by AE at 3/21/2025 1112                                       User Key       Initials Effective Dates Name Provider Type Discipline    AE 09/21/21 -  Johnny Wilkinson PT Physical Therapist PT                  PT Recommendation and Plan  Planned Therapy Interventions (PT): balance training, bed mobility training, gait training, home exercise program, patient/family education, postural re-education, ROM (range of motion), stair training, strengthening, transfer training  Progress: no change  Outcome Evaluation: Pt presents with decreased functional mobility and decreased activity tolerance. Pt ambulated 200ft with CGA and use of knee scooter. Recommend continued skilled IP PT interventions. Recommend D/C home with assist and HHPT.     Time Calculation:   PT Evaluation Complexity  History, PT Evaluation Complexity: 1-2 personal factors and/or comorbidities  Examination of Body Systems (PT Eval Complexity): total of 3 or more elements  Clinical Presentation (PT Evaluation Complexity): stable  Clinical Decision Making (PT Evaluation Complexity): low complexity  Overall Complexity (PT Evaluation Complexity): low complexity     PT Charges       Row Name 03/21/25 1546             Time Calculation    Start Time 1112  -AE      PT Received On 03/21/25  -AE      PT Goal Re-Cert Due Date 03/31/25  -AE         Untimed Charges    PT Eval/Re-eval Minutes 37  -AE         Total Minutes    Untimed Charges Total Minutes 37  -AE       Total Minutes 37  -AE                User Key  (r) = Recorded By, (t) = Taken By, (c) = Cosigned By      Initials Name Provider Type    AE Johnny Wilkinson PT Physical Therapist                  Therapy Charges for Today       Code Description Service Date Service Provider Modifiers Qty    71595423323 HC PT EVAL LOW COMPLEXITY 3 3/21/2025 Johnny Wilkinson PT GP 1            PT G-Codes  Outcome Measure Options: AM-PAC 6 Clicks Basic Mobility (PT)  AM-PAC 6 Clicks Score (PT): 18  AM-PAC 6 Clicks Score (OT): 16  PT Discharge Summary  Anticipated  Discharge Disposition (PT): home with assist, home with home health    Johnny Wilkinson, PT  3/21/2025

## 2025-03-21 NOTE — PROGRESS NOTES
General Surgery    I have reviewed the chart, my prior note(s), appropriate imaging, and labs.  I have again discussed the risks and benefits of right inguinal herniorrhaphy with mesh with the patient and his family directly personally.  Discussion regarding the risks and benefits of the procedure included, but was not limited to: bleeding, infection, injury to adjacent viscera (hernia contents, spermatic cord, ilioinguinal/genitofemoral nerves etc.), testicular loss, chronic pain, need for reintervention, recurrent hernia, mesh complications (fistula formation etc.), and medical issues from a cardiopulmonary and deep venous thrombosis standpoint. All of their questions have been answered.  They understand and wish to proceed with surgical intervention.  Xarelto reversed with Kcentra.    CBC  Results from last 7 days   Lab Units 03/21/25  0502   WBC 10*3/mm3 3.48   HEMOGLOBIN g/dL 12.3*   HEMATOCRIT % 39.7   PLATELETS 10*3/mm3 224       CMP  Results from last 7 days   Lab Units 03/21/25  0502   SODIUM mmol/L 139   POTASSIUM mmol/L 4.1   CHLORIDE mmol/L 107   CO2 mmol/L 22.0   BUN mg/dL 8   CREATININE mg/dL 0.67*   CALCIUM mg/dL 8.1*   BILIRUBIN mg/dL 0.3   ALK PHOS U/L 65   ALT (SGPT) U/L 40   AST (SGOT) U/L 38   GLUCOSE mg/dL 88       Coagulation Cascade  PTT   Date Value Ref Range Status   04/11/2023 106.6 (H) 60.0 - 90.0 seconds Final     INR   Date Value Ref Range Status   04/12/2023 1.23 (H) 0.84 - 1.13 Final     Protime   Date Value Ref Range Status   04/12/2023 15.4 (H) 11.4 - 14.4 Seconds Final

## 2025-03-21 NOTE — ANESTHESIA POSTPROCEDURE EVALUATION
Patient: Gaetano Marcelo    Procedure Summary       Date: 03/21/25 Room / Location:  [facility]    Anesthesia Start: 1613 Anesthesia Stop: 1854    Procedure: RIGHT INGUINAL HERNIA REPAIR (Right: Abdomen) Diagnosis:     Surgeons: Han Naranjo MD Provider: Salvatore Macdonald MD    Anesthesia Type: general with block ASA Status: 3            Anesthesia Type: general with block    Vitals  Vitals Value Taken Time   BP     Temp     Pulse 84 03/21/25 18:54   Resp     SpO2     Vitals shown include unfiled device data.        Post Anesthesia Care and Evaluation    Patient location during evaluation: PACU  Patient participation: complete - patient participated  Level of consciousness: awake and alert  Pain management: adequate    Airway patency: patent  Anesthetic complications: No anesthetic complications  PONV Status: none  Cardiovascular status: hemodynamically stable and acceptable  Respiratory status: nonlabored ventilation, acceptable and nasal cannula  Hydration status: acceptable

## 2025-03-21 NOTE — ANESTHESIA PREPROCEDURE EVALUATION
Anesthesia Evaluation     Patient summary reviewed and Nursing notes reviewed   NPO Solid Status: > 8 hours             Airway   Mallampati: I  TM distance: >3 FB  Neck ROM: full  No difficulty expected  Dental          Pulmonary     breath sounds clear to auscultation  (+) ,shortness of breath  Cardiovascular     ECG reviewed  Rhythm: regular  Rate: normal    (+) hypertension, hyperlipidemia      Neuro/Psych  (+) numbness, psychiatric history Depression  GI/Hepatic/Renal/Endo    (+) obesity, GERD, diabetes mellitus type 2    Musculoskeletal     Abdominal    Substance History      OB/GYN          Other   arthritis,     ROS/Med Hx Other: Last use GLP 1 agonist 10 days prior                Anesthesia Plan    ASA 3     general with block   Rapid sequence  intravenous induction     Anesthetic plan, risks, benefits, and alternatives have been provided, discussed and informed consent has been obtained with: patient.    Plan discussed with CRNA.    CODE STATUS:    Code Status (Patient has no pulse and is not breathing): CPR (Attempt to Resuscitate)  Medical Interventions (Patient has pulse or is breathing): Full Support  Level Of Support Discussed With: Patient

## 2025-03-21 NOTE — CASE MANAGEMENT/SOCIAL WORK
Discharge Planning Assessment  [facility]     Patient Name: Gaetano Marcelo  MRN: [id number]  Today's Date: 3/21/2025    Admit Date: 3/19/2025    Plan: Home with wife   Discharge Needs Assessment       Row Name 03/21/25 0955       Living Environment    People in Home spouse    Current Living Arrangements home    Potentially Unsafe Housing Conditions none    In the past 12 months has the electric, gas, oil, or water company threatened to shut off services in your home? No    Primary Care Provided by self    Provides Primary Care For no one    Family Caregiver if Needed spouse    Family Caregiver Names Aubrie, spouse [phone]    Quality of Family Relationships unable to assess    Able to Return to Prior Arrangements yes       Resource/Environmental Concerns    Resource/Environmental Concerns none    Transportation Concerns none       Transportation Needs    In the past 12 months, has lack of transportation kept you from medical appointments or from getting medications? no    In the past 12 months, has lack of transportation kept you from meetings, work, or from getting things needed for daily living? No       Food Insecurity    Within the past 12 months, you worried that your food would run out before you got the money to buy more. Never true    Within the past 12 months, the food you bought just didn't last and you didn't have money to get more. Never true       Transition Planning    Patient/Family Anticipates Transition to home with family    Patient/Family Anticipated Services at Transition     Transportation Anticipated family or friend will provide       Discharge Needs Assessment    Readmission Within the Last 30 Days no previous admission in last 30 days    Equipment Currently Used at Home cane, quad;rollator;other (see comments);commode  knee scooter    Concerns to be Addressed discharge planning;denies needs/concerns at this time    Do you want help finding or keeping work or a job? I do not  need or want help    Do you want help with school or training? For example, starting or completing job training or getting a high school diploma, GED or equivalent No                   Discharge Plan       Row Name 03/21/25 0957       Plan    Plan Home with wife    Patient/Family in Agreement with Plan yes    Plan Comments Spoke to patient at bedside to initiate discharge planning. Patient lives at home with his wife in [facility]. Prior to admission, he was independent with ADL's. Wife assists with meds & shopping. He has a rollator, quad cane bedside commode, & is using a knee scooter (RLE NWB). He is not current with home health or outpatient therapy services and does not use home oxygen. His PCP is Tushar Shepherd. Verified [facility] Medicare. His plan is home with his wife. Wife will transport. Will await therapy recommendations. CM will continue to follow.    Final Discharge Disposition Code 01 - home or self-care                    Expected Discharge Date and Time       Expected Discharge Date Expected Discharge Time    Mar 22, 2025            Demographic Summary       Row Name 03/21/25 0953       General Information    Admission Type inpatient    Arrived From emergency department    Referral Source admission list    Reason for Consult discharge planning    Preferred Language English                   Functional Status       Row Name 03/21/25 0954       Functional Status    Usual Activity Tolerance good    Current Activity Tolerance other (see comments)  see therapy notes       Physical Activity    On average, how many days per week do you engage in moderate to strenuous exercise (like a brisk walk)? 0 days    On average, how many minutes do you engage in exercise at this level? 0 min    Number of minutes of exercise per week 0       Functional Status, IADL    Medications assistive person    Meal Preparation independent    Housekeeping assistive person    Laundry assistive person    Shopping assistive person     If for any reason you need help with day-to-day activities such as bathing, preparing meals, shopping, managing finances, etc., do you get the help you need? I get all the help I need                   Psychosocial    No documentation.                  Abuse/Neglect    No documentation.                  Legal    No documentation.                  Substance Abuse    No documentation.                  Patient Forms    No documentation.                     Lucie Soria RN

## 2025-03-21 NOTE — ANESTHESIA PROCEDURE NOTES
Airway  Reason: elective    Date/Time: 3/21/2025 4:18 PM  Airway not difficult    General Information and Staff    Patient location during procedure: OR  CRNA/CAA: Lei Hugo CRNA    Indications and Patient Condition  Indications for airway management: airway protection    Preoxygenated: yes  MILS not maintained throughout    Mask difficulty assessment: 1 - vent by mask    Final Airway Details    Final airway type: endotracheal airway      Successful airway: ETT  Cuffed: yes   Successful intubation technique: direct laryngoscopy  Adjuncts used in placement: intubating stylet  Endotracheal tube insertion site: oral  Blade: Owen  Blade size: 2  ETT size (mm): 7.5  Cormack-Lehane Classification: grade I - full view of glottis  Placement verified by: chest auscultation and capnometry   Cuff volume (mL): 8  Measured from: lips  ETT/EBT  to lips (cm): 23  Number of attempts at approach: 1  Assessment: lips, teeth, and gum same as pre-op and atraumatic intubation    Additional Comments  Negative epigastric sounds, Breath sound equal bilaterally with symmetric chest rise and fall

## 2025-03-21 NOTE — OP NOTE
General Surgery Operative Note    Gaetano Marcelo  [id number]  1960    Date of Surgery:  3/21/2025 18:41 EDT    Pre-op Diagnosis: Right inguinal hernia    Post-op Diagnosis: Right indirect inguinal hernia    Procedure: Open inguinal hernia repair with Prolene mesh, right    Surgeon: Han Naranjo MD    Anesthesia: General    Assistant: Assistant: Sree Arellano PA-C        was responsible for performing the following activities: Retraction, Suction, Irrigation, Suturing, Closing, and Placing Dressing and their skilled assistance was necessary for the success of this case.    Fluids: 1250 mL crystalloid    Estimated Blood Loss: 25 mL    Urine Voided: Not recorded    Findings: Chronically scarred indirect inguinal hernia    Complications: None apparent    History:   64-year-old gentleman presents with a symptomatic inguinal hernia, reduced in the emergency room     The risks and benefits of inguinal hernia repair with mesh were rehashed.  Our discussion included, but was not limited to: bleeding, infection, injury to adjacent viscera (spermatic cord, ilioinguinal/genitofemoral nerves), seroma, mesh complications, chronic pain, testicular loss, chronic pain, and medical issues from a cardiopulmonary and deep venous thrombosis standpoint.  All questions were answered and he understands and wishes to proceed.    Procedure:      After informed consent the patient was taken to the operating room and placed in the supine position on the operating table.  General anesthesia was induced, TAP blocks were placed by anesthesia, the groin was then prepped and draped in the standard sterile fashion.  Appropriate antibiotic prophylaxis was administered to the patient. A timeout was observed.        The anterior superior iliac spine and pubic tubercle were identified and an incision parallel to this was created.  I dissected down through Carlos's fascia to the external oblique which was incised in the direction of its  fibers through the external ring.  Care was taken to identify the ilioinguinal nerve.  I obtained control of the cord structures sharply at the level of the pubic tubercle and encircled them with a Penrose drain.  The cremasteric fibers were stripped down.  This demonstrated a large chronically scarred take indirect inguinal hernia sac.  This was densely adherent to the cord structures.  The vas deferens was identified and  from the hernia sac back to the level of the internal ring.  The hernia sac was amputated at the level of the internal ring after placing a 3-0 Vicryl pursestring suture.  There was no evidence of a direct inguinal hernia. The Prolene mesh was then tacked to the pubic tubercle and run laterally along the inguinal ligament with Novafil suture.  A shutter in the mesh was created with adequate laxity for the cord structures themselves.  Then medially, superiorly, and laterally the mesh was tucked under the external oblique.  The wound was copiously irrigated and meticulous hemostasis obtained.  The external oblique was closed in a running fashion with 3-0 Vicryl, Carlos's fascia was reapproximated with interrupted 3-0 Vicryl, and the skin was closed with a running 4-0 subcuticular Monocryl.  Skin glue was used as a dressing.     All lap and needle counts were reported as correct at the end of the procedure ×2.  The patient was then transferred to the PACU.     Han Naranjo MD     Date: 3/21/2025  Time: 18:41 EDT

## 2025-03-21 NOTE — PROGRESS NOTES
[facility] Medicine Services  PROGRESS NOTE    Patient Name: Gaetano Marcelo  : 1960  MRN: [id number]    Date of Admission: 3/19/2025  Primary Care Physician: Tushar Shepherd Jr., MD    Subjective   Subjective     CC:  Abdominal pain    HPI:  Having some lower back pain but most is in the area of the right inguinal hernia.  States that was treated for osteo? Of back back in  with Dr. Butt/surgery and MALICK Novak for ID, and reports finished 5 weeks of IV abx.  States that he saw Dr. Donohue recently and he was concerned that infection could be back?      Objective   Objective     Vital Signs:   Temp:  [97.8 °F (36.6 °C)-98.7 °F (37.1 °C)] 98.7 °F (37.1 °C)  Heart Rate:  [77-95] 84  Resp:  [18] 18  BP: (119-152)/() 119/92     Physical Exam:  Constitutional: No acute distress, awake, alert  HENT: NCAT, mucous membranes moist  Respiratory: Clear to auscultation bilaterally, respiratory effort normal   Cardiovascular: RRR, no murmurs, rubs, or gallops  Gastrointestinal: Positive bowel sounds, soft, TTP diffuse lower abdomen, large reducible hernia right groin  Musculoskeletal: No bilateral ankle edema  Psychiatric: Appropriate affect, cooperative  Neurologic: Oriented x 3, CALDERON speech clear  Skin: No rashes      Results Reviewed:  LAB RESULTS:      Lab 25  0502 25  0525   WBC 3.48 4.39 4.90   HEMOGLOBIN 12.3* 11.6* 12.7*   HEMATOCRIT 39.7 36.8* 40.1   PLATELETS 224 207 250   NEUTROS ABS  --  2.36 2.79   IMMATURE GRANS (ABS)  --  0.01 0.01   LYMPHS ABS  --  1.43 1.50   MONOS ABS  --  0.42 0.45   EOS ABS  --  0.14 0.11   MCV 88.0 88.2 88.5   CRP <0.30  --   --    LACTATE  --   --  1.2         Lab 25  0502 25  0525   SODIUM 139 138 137   POTASSIUM 4.1 4.5 5.0   CHLORIDE 107 106 103   CO2 22.0 24.0 23.0   ANION GAP 10.0 8.0 11.0   BUN 8 14 17   CREATININE 0.67* 0.64* 0.92   EGFR 103.0 104.4 91.7   GLUCOSE 88 89 120*   CALCIUM 8.1*  8.1* 8.3*   IONIZED CALCIUM 1.12*  --   --    MAGNESIUM 1.7  --  1.6   PHOSPHORUS 2.9  --   --    HEMOGLOBIN A1C  --   --  5.60         Lab 03/21/25  0502 03/20/25  0527 03/19/25  2113   TOTAL PROTEIN 4.8* 4.9* 5.6*   ALBUMIN 3.0* 3.1* 3.5   GLOBULIN 1.8 1.8 2.1   ALT (SGPT) 40 40 50*   AST (SGOT) 38 45* 68*   BILIRUBIN 0.3 0.3 0.2   ALK PHOS 65 65 70   LIPASE  --   --  11*                     Brief Urine Lab Results  (Last result in the past 365 days)        Color   Clarity   Blood   Leuk Est   Nitrite   Protein   CREAT   Urine HCG        08/21/24 1216             49.5                 Microbiology Results Abnormal       None            CT Abdomen Pelvis Without Contrast  Result Date: 3/19/2025  CT SCAN OF THE ABDOMEN AND PELVIS WITHOUT CONTRAST     3/19/2025 4:01 PM HISTORY: Acute right groin pain. COMPARISON: January 9, 2012. PROCEDURE: Axial images were obtained from the lung bases to the pubic symphysis by computed tomography. Oral contrast was administered. This study was performed with techniques to keep radiation doses as low as reasonably achievable, (ALARA). Individualized dose reduction techniques using automated exposure control or adjustment of mA and/or kV according  to the patient size were employed. FINDINGS: ABDOMEN: The lung bases are clear. The heart size is normal. The patient is status post gastric bypass surgery. The limited non-contrast images of the liver are unremarkable. The gallbladder is absent. The spleen is unremarkable. No adrenal masses are seen. The aorta is normal in caliber. There is no significant free fluid or adenopathy.  There is no nephrolithiasis. There is no hydronephrosis. A ventral hernia is noted left of the umbilicus containing unobstructed bowel. PELVIS:  A right inguinal hernia is noted containing bowel that is mildly distended. An early bowel obstruction is not excluded. The appendix is not identified. The urinary bladder is unremarkable. There is no fluid or  adenopathy.  Postoperative changes of posterior fixation of L4-S1 are noted. There is endplate irregularity at these levels. There is possible discitis/osteomyelitis at the L4-5 and L5-S1 levels.    Impression: Right inguinal hernia containing bowel that is mildly distended. Early bowel obstruction is not excluded. Possible discitis/osteomyelitis of the L4-5 and L5-S1 levels. The ordering provider's office was notified at  3/19/2025 4:53 PM Images reviewed, interpreted, and dictated by Dr. SEFERINO Jacobo. Transcribed by Anabella Vergara PA-C.          Current medications:  Scheduled Meds:amitriptyline, 25 mg, Oral, Nightly  atorvastatin, 20 mg, Oral, Daily  citalopram, 20 mg, Oral, Daily  [Held by provider] cloNIDine, 0.1 mg, Oral, Q12H  doxycycline, 100 mg, Oral, Q12H  gabapentin, 400 mg, Oral, BID  [Held by provider] hydrALAZINE, 25 mg, Oral, BID  insulin lispro, 2-7 Units, Subcutaneous, 4x Daily AC & at Bedtime  lansoprazole, 30 mg, Nasogastric, Q AM  metoprolol succinate XL, 100 mg, Oral, Daily  multivitamin with minerals, 1 tablet, Oral, Daily  oxybutynin XL, 5 mg, Oral, Daily  sodium chloride, 10 mL, Intravenous, Q12H      Continuous Infusions:sodium chloride, 100 mL/hr, Last Rate: 100 mL/hr (03/21/25 0658)      PRN Meds:.  acetaminophen **OR** acetaminophen **OR** acetaminophen    dextrose    dextrose    glucagon (human recombinant)    HYDROcodone-acetaminophen    melatonin    ondansetron    sodium chloride    sodium chloride    temazepam    Assessment & Plan   Assessment & Plan     Active Hospital Problems    Diagnosis  POA    **SBO (small bowel obstruction) [K56.609]  Yes    Anemia [D64.9]  Yes    Diabetic foot ulcer [E11.621, L97.509]  Yes    History of DVT (deep vein thrombosis) [Z86.718]  Not Applicable    GERD (gastroesophageal reflux disease) [K21.9]  Yes    Type 2 diabetes mellitus with complication, without long-term current use of insulin [E11.8]  Yes    Depressive disorder [F32.A]  Yes    Chronic  pain disorder [G89.4]  Yes    Essential hypertension [I10]  Yes    Gastroesophageal reflux disease without esophagitis [K21.9]  Yes    Hyperlipidemia [E78.5]  Yes      Resolved Hospital Problems   No resolved problems to display.        Brief Hospital Course to date:  Gaetano Marcelo is a 64 y.o. male     Incarcerated Right Inguinal hernia  - s/p reduction under under sedation in ED  - surgery/Dr. Naranjo following, plans elective hernia repair today.  Gave Kcentra on 3/20 to reverse xarelto  - NPO  - IVF    ?osteomyelitis/Diskitis L4/5 and L5/S1  --h/o of this s/p surgery per Dr. Santiago and IV abx per Dr. Donohue in June 2024  --findings again concerning for possible infection on CT abd  --will MRI L spine to look at better to see if this is recurrent infection or changes from previous infection  --WBC wnl  --check CRP and ESR and blood cultures  --continue the doxy that he has been on for now  --consider ID consult pending MRI results       Diabetic Foot Ulcer  - currently on doxycycline. Continue  - wound care     DM2  - hold oral hypoglycemics  - SSI with scheduled accu checks  - hb A1c 5.60     Anemia  - H/H stable.  - monitor     HTN  HLD  - BP borderline so hold Clonidine and Hydralazine for now.  Continue metoprolol  - continue statin     GERD  - PPI     History of DVT  PVD  - on ASA and Xarelto. Holding for now.  S/p Kcentra on 3/20 to reverse xarelto on 3/20 per surgery  - resume once cleared by general surgery        Expected Discharge Location and Transportation:   Expected Discharge   Expected Discharge Date: 3/22/2025; Expected Discharge Time:      VTE Prophylaxis:  Mechanical VTE prophylaxis orders are present.         AM-PAC 6 Clicks Score (PT): 22 (03/20/25 2000)    CODE STATUS:   Code Status and Medical Interventions: CPR (Attempt to Resuscitate); Full Support   Ordered at: 03/20/25 0040     Code Status (Patient has no pulse and is not breathing):    CPR (Attempt to Resuscitate)     Medical Interventions  (Patient has pulse or is breathing):    Full Support     Level Of Support Discussed With:    Patient       Cash Chin MD  03/21/25

## 2025-03-21 NOTE — THERAPY EVALUATION
Patient Name: Gaetano Marcelo  : 1960    MRN: [id number]                              Today's Date: 3/21/2025       Admit Date: 3/19/2025    Visit Dx:     ICD-10-CM ICD-9-CM   1. Incarcerated right inguinal hernia  K40.30 550.10   2. Right lower quadrant abdominal pain  R10.31 789.03     Patient Active Problem List   Diagnosis    Type 2 diabetes mellitus with complication, without long-term current use of insulin    Chronic pain disorder    Depressive disorder    Morbid obesity    Gastroesophageal reflux disease without esophagitis    Hyperlipidemia    Essential hypertension    Insomnia    Low back pain    GERD (gastroesophageal reflux disease)    Hypertension    Osteoarthritis    Pain, phantom limb    Peripheral edema    History of DVT (deep vein thrombosis)    Elevated LFTs    Diarrhea    Dyspnea    SBO (small bowel obstruction)    Anemia    Diabetic foot ulcer     Past Medical History:   Diagnosis Date    GERD (gastroesophageal reflux disease)     Hyperlipidemia     Hypertension     Insomnia     Low back pain     Osteoarthritis     Pain, phantom limb     Peripheral edema     Type 2 diabetes mellitus      Past Surgical History:   Procedure Laterality Date    APPENDECTOMY      CARPAL TUNNEL RELEASE Left 2020    Dr. Yosef Diaz    CHOLECYSTECTOMY      HERNIA REPAIR      REPLACEMENT TOTAL KNEE Right     KANWAL-EN-Y PROCEDURE  2004    TOE AMPUTATION Right     1st, 2nd Rt toes    ULNAR NERVE DECOMPRESSION Left 2020    Dr. Yosef Diaz      General Information       Row Name 25 1335          OT Time and Intention    Document Type evaluation  -AC     Mode of Treatment occupational therapy  -AC       Row Name 25 1335          General Information    Patient Profile Reviewed yes  -AC     Prior Level of Function independent:;all household mobility;feeding;grooming;mod assist:;dressing;bathing  most recently using knee scooter d/t  diabetic R foot ulcer, also has a rollator and FWW that he  did not need to use  -     Existing Precautions/Restrictions fall  RLE heel WB for transfers, and NWB for ambulation per pt  -AC     Barriers to Rehab medically complex  -AC       Row Name 03/21/25 1335          Occupational Profile    Environmental Supports and Barriers (Occupational Profile) walk in shower with shower seat  -AC       Row Name 03/21/25 1335          Living Environment    Current Living Arrangements home  -AC     People in Home spouse  -AC       Row Name 03/21/25 1335          Home Main Entrance    Number of Stairs, Main Entrance none  -AC       Row Name 03/21/25 1335          Stairs Within Home, Primary    Number of Stairs, Within Home, Primary none  -AC       Row Name 03/21/25 1335          Cognition    Orientation Status (Cognition) oriented x 4  -AC       Row Name 03/21/25 1335          Safety Issues/Impairments Affecting Functional Mobility    Safety Issues Affecting Function (Mobility) awareness of need for assistance;insight into deficits/self-awareness;safety precaution awareness;safety precautions follow-through/compliance  difficulty maintianing heel WB with STS  -AC     Impairments Affecting Function (Mobility) balance;endurance/activity tolerance;strength  -AC               User Key  (r) = Recorded By, (t) = Taken By, (c) = Cosigned By      Initials Name Provider Type    AC Kamini Neal OT Occupational Therapist                     Mobility/ADL's       Row Name 03/21/25 1342          Bed Mobility    Bed Mobility supine-sit;sit-supine  -     Supine-Sit [place] (Bed Mobility) standby assist  -     Sit-Supine [place] (Bed Mobility) standby assist  -     Bed Mobility, Safety Issues decreased use of legs for bridging/pushing  -     Assistive Device (Bed Mobility) bed rails;head of bed elevated  -       Row Name 03/21/25 1342          Transfers    Transfers sit-stand transfer  -       Row Name 03/21/25 1342          Sit-Stand Transfer    Sit-Stand [place]  (Transfers) contact guard  -     Assistive Device (Sit-Stand Transfers) walker, knee scooter  -       Row Name 03/21/25 1342          Functional Mobility    Functional Mobility- Ind. Level contact guard assist  -     Functional Mobility- Device other (see comments)  knee scooter  -     Functional Mobility-Distance (Feet) --  household distance  -     Functional Mobility- Safety Issues balance decreased during turns  -       Row Name 03/21/25 1342          Activities of Daily Living    BADL Assessment/Intervention lower body dressing  -       Row Name 03/21/25 1342          Mobility    Extremity Weight-bearing Status right lower extremity  -     Right Lower Extremity (Weight-bearing Status) other (see comments)  heel WB for transfer, NWB for ambulation  -       Row Name 03/21/25 1342          Lower Body Dressing Assessment/Training    [place] Level (Lower Body Dressing) don;doff;shoes/slippers;maximum assist (25% patient effort)  -     Assistive Devices (Lower Body Dressing) long-handled shoe horn  -     Position (Lower Body Dressing) edge of bed sitting  -               User Key  (r) = Recorded By, (t) = Taken By, (c) = Cosigned By      Initials Name Provider Type     Kamini Neal, OT Occupational Therapist                   Obj/Interventions       Row Name 03/21/25 1345          Sensory Assessment (Somatosensory)    Sensory Subjective Reports numbness;tingling;other (see comments)  B hands  -       Row Name 03/21/25 1345          Vision Assessment/Intervention    Visual Impairment/Limitations corrective lenses full-time  -       Row Name 03/21/25 1345          Range of Motion Comprehensive    General Range of Motion bilateral upper extremity ROM WNL  -       Row Name 03/21/25 1345          Strength Comprehensive (MMT)    Comment, General Manual Muscle Testing (MMT) Assessment BUE grossly 4/5  -       Row Name 03/21/25 1345          Balance    Balance Assessment sitting  static balance;sitting dynamic balance;standing static balance;standing dynamic balance  -AC     Static Sitting Balance standby assist  -AC     Dynamic Sitting Balance standby assist  -AC     Position, Sitting Balance sitting edge of bed  -AC     Static Standing Balance verbal cues;contact guard  -AC     Dynamic Standing Balance verbal cues;contact guard  -AC     Position/Device Used, Standing Balance supported;other (see comments)  knee scooter  -AC               User Key  (r) = Recorded By, (t) = Taken By, (c) = Cosigned By      Initials Name Provider Type    Kamini Rouse, OT Occupational Therapist                   Goals/Plan       Row Name 03/21/25 [facility]          Transfer Goal 1 (OT)    Activity/Assistive Device (Transfer Goal 1, OT) bed-to-chair/chair-to-bed;toilet  knee scooter  -AC     [place] Level/Cues Needed (Transfer Goal 1, OT) standby assist  -AC     Time Frame (Transfer Goal 1, OT) long term goal (LTG);10 days  -AC     Progress/Outcome (Transfer Goal 1, OT) new goal;goal ongoing  -       Row Name 03/21/25 [facility]          Toileting Goal 1 (OT)    Activity/Device (Toileting Goal 1, OT) adjust/manage clothing;perform perineal hygiene;commode, bedside without drop arms  -AC     [place] Level/Cues Needed (Toileting Goal 1, OT) standby assist  -AC     Time Frame (Toileting Goal 1, OT) short term goal (STG);5 days  -AC     Progress/Outcome (Toileting Goal 1, OT) new goal;goal ongoing  -       Row Name 03/21/25 [facility]          Therapy Assessment/Plan (OT)    Planned Therapy Interventions (OT) activity tolerance training;adaptive equipment training;functional balance retraining;occupation/activity based interventions;patient/caregiver education/training;strengthening exercise;transfer/mobility retraining  -               User Key  (r) = Recorded By, (t) = Taken By, (c) = Cosigned By      Initials Name Provider Type    Kamini Rouse, PABLO Occupational Therapist                   Clinical  Impression       Row Name 03/21/25 1346          Pain Assessment    Pain Location groin  -AC     Pain Side/Orientation right  -AC     Pain Management Interventions exercise or physical activity utilized  -AC     Response to Pain Interventions activity participation with tolerable pain  -     Additional Documentation Pain Scale: FACES Pre/Post-Treatment (Group)  -       Row Name 03/21/25 1346          Pain Scale: FACES Pre/Post-Treatment    Pain: FACES Scale, Pretreatment 2-->hurts little bit  -AC     Posttreatment Pain Rating 2-->hurts little bit  -AC       Row Name 03/21/25 1346          Plan of Care Review    Plan of Care Reviewed With patient;spouse  -     Outcome Evaluation Pt presents with decr balance and activity tolerance limiting ind with self care.  Pt max A  LBD,  CGA  with ambulation using knee scooter.  OT will follow to advance pt toward PLOF.  Recommend home with assist and HHOT.  -       Row Name 03/21/25 1346          Therapy Assessment/Plan (OT)    Criteria for Skilled Therapeutic Interventions Met (OT) yes;skilled treatment is necessary  -     Therapy Frequency (OT) daily  -       Row Name 03/21/25 1346          Therapy Plan Review/Discharge Plan (OT)    Anticipated Discharge Disposition (OT) home with assist;home with home health  -       Row Name 03/21/25 1346          Vital Signs    Pre Systolic BP Rehab 142  -AC     Pre Treatment Diastolic   -AC     Pretreatment Heart Rate (beats/min) 89  -AC     Posttreatment Heart Rate (beats/min) 89  -AC     Pre SpO2 (%) 90  -AC     O2 Delivery Pre Treatment room air  -AC     O2 Delivery Intra Treatment room air  -AC     Post SpO2 (%) 97  -AC     O2 Delivery Post Treatment room air  -AC     Pre Patient Position Supine  -AC     Post Patient Position Supine  -AC       Row Name 03/21/25 1346          Positioning and Restraints    Pre-Treatment Position in bed  -AC     Post Treatment Position bed  -AC     In Bed notified  nsg;supine;fowlers;call light within reach;encouraged to call for assist;with family/caregiver;heels elevated  -               User Key  (r) = Recorded By, (t) = Taken By, (c) = Cosigned By      Initials Name Provider Type    Kamini Rouse, OT Occupational Therapist                   Outcome Measures       Row Name 03/21/25 1352          How much help from another is currently needed...    Putting on and taking off regular lower body clothing? 2  -AC     Bathing (including washing, rinsing, and drying) 2  -AC     Toileting (which includes using toilet bed pan or urinal) 2  -AC     Putting on and taking off regular upper body clothing 3  -AC     Taking care of personal grooming (such as brushing teeth) 3  -AC     Eating meals 4  -AC     AM-PAC 6 Clicks Score (OT) 16  -       Row Name 03/21/25 1352          Functional Assessment    Outcome Measure Options AM-PAC 6 Clicks Daily Activity (OT)  -               User Key  (r) = Recorded By, (t) = Taken By, (c) = Cosigned By      Initials Name Provider Type    Kamini Rouse, OT Occupational Therapist                    Occupational Therapy Education       Title: PT OT SLP Therapies (In Progress)       Topic: Occupational Therapy (In Progress)       Point: ADL training (In Progress)       Learning Progress Summary            Patient Acceptance, E, NR by  at 3/21/2025 1353                                      User Key       Initials Effective Dates Name Provider Type Discipline     02/03/23 -  Kamini Neal, OT Occupational Therapist OT                  OT Recommendation and Plan  Planned Therapy Interventions (OT): activity tolerance training, adaptive equipment training, functional balance retraining, occupation/activity based interventions, patient/caregiver education/training, strengthening exercise, transfer/mobility retraining  Therapy Frequency (OT): daily  Plan of Care Review  Plan of Care Reviewed With: patient, spouse  Outcome Evaluation: Pt  presents with decr balance and activity tolerance limiting ind with self care.  Pt max A  LBD,  CGA  with ambulation using knee scooter.  OT will follow to advance pt toward PLOF.  Recommend home with assist and HHOT.     Time Calculation:   Evaluation Complexity (OT)  Review Occupational Profile/Medical/Therapy History Complexity: expanded/moderate complexity  Assessment, Occupational Performance/Identification of Deficit Complexity: 3-5 performance deficits  Clinical Decision Making Complexity (OT): detailed assessment/moderate complexity  Overall Complexity of Evaluation (OT): moderate complexity     Time Calculation- OT       Row Name 03/21/25 1058             Time Calculation- OT    OT Start Time 1058  -AC      OT Received On 03/21/25  -AC      OT Goal Re-Cert Due Date 03/31/25  -AC         Untimed Charges    OT Eval/Re-eval Minutes 50  -AC         Total Minutes    Untimed Charges Total Minutes 50  -AC       Total Minutes 50  -AC                User Key  (r) = Recorded By, (t) = Taken By, (c) = Cosigned By      Initials Name Provider Type    AC Kamini Neal, OT Occupational Therapist                  Therapy Charges for Today       Code Description Service Date Service Provider Modifiers Qty    [id number] HC OT EVAL MOD COMPLEXITY 4 3/21/2025 Kamini Neal OT GO 1                 Kamini Neal OT  3/21/2025

## 2025-03-21 NOTE — PLAN OF CARE
Goal Outcome Evaluation:  Plan of Care Reviewed With: patient, spouse           Outcome Evaluation: Pt presents with decr balance and activity tolerance limiting ind with self care.  Pt max A  LBD,  CGA  with ambulation using knee scooter.  OT will follow to advance pt toward PLOF.  Recommend home with assist and HHOT.    Anticipated Discharge Disposition (OT): home with assist, home with home health

## 2025-03-21 NOTE — PLAN OF CARE
Goal Outcome Evaluation:  Plan of Care Reviewed With: patient, spouse        Progress: no change  Outcome Evaluation: Pt presents with decreased functional mobility and decreased activity tolerance. Pt ambulated 200ft with CGA and use of knee scooter. Recommend continued skilled IP PT interventions. Recommend D/C home with assist and HHPT.    Anticipated Discharge Disposition (PT): home with assist, home with home health

## 2025-03-22 LAB
ANION GAP SERPL CALCULATED.3IONS-SCNC: 13 MMOL/L (ref 5–15)
BUN SERPL-MCNC: 9 MG/DL (ref 8–23)
BUN/CREAT SERPL: 12.2 (ref 7–25)
CALCIUM SPEC-SCNC: 8.2 MG/DL (ref 8.6–10.5)
CHLORIDE SERPL-SCNC: 103 MMOL/L (ref 98–107)
CO2 SERPL-SCNC: 18 MMOL/L (ref 22–29)
CREAT SERPL-MCNC: 0.74 MG/DL (ref 0.76–1.27)
DEPRECATED RDW RBC AUTO: 47.2 FL (ref 37–54)
EGFRCR SERPLBLD CKD-EPI 2021: 99.9 ML/MIN/1.73
ERYTHROCYTE [DISTWIDTH] IN BLOOD BY AUTOMATED COUNT: 14.7 % (ref 12.3–15.4)
ERYTHROCYTE [SEDIMENTATION RATE] IN BLOOD: 12 MM/HR (ref 0–20)
GLUCOSE BLDC GLUCOMTR-MCNC: 143 MG/DL (ref 70–130)
GLUCOSE BLDC GLUCOMTR-MCNC: 163 MG/DL (ref 70–130)
GLUCOSE BLDC GLUCOMTR-MCNC: 197 MG/DL (ref 70–130)
GLUCOSE BLDC GLUCOMTR-MCNC: 198 MG/DL (ref 70–130)
GLUCOSE SERPL-MCNC: 153 MG/DL (ref 65–99)
HCT VFR BLD AUTO: 40.4 % (ref 37.5–51)
HGB BLD-MCNC: 12.9 G/DL (ref 13–17.7)
MCH RBC QN AUTO: 27.7 PG (ref 26.6–33)
MCHC RBC AUTO-ENTMCNC: 31.9 G/DL (ref 31.5–35.7)
MCV RBC AUTO: 86.9 FL (ref 79–97)
PLATELET # BLD AUTO: 231 10*3/MM3 (ref 140–450)
PMV BLD AUTO: 9.7 FL (ref 6–12)
POTASSIUM SERPL-SCNC: 4.9 MMOL/L (ref 3.5–5.2)
RBC # BLD AUTO: 4.65 10*6/MM3 (ref 4.14–5.8)
SODIUM SERPL-SCNC: 134 MMOL/L (ref 136–145)
WBC NRBC COR # BLD AUTO: 6.43 10*3/MM3 (ref 3.4–10.8)

## 2025-03-22 PROCEDURE — 99232 SBSQ HOSP IP/OBS MODERATE 35: CPT | Performed by: INTERNAL MEDICINE

## 2025-03-22 PROCEDURE — 82948 REAGENT STRIP/BLOOD GLUCOSE: CPT

## 2025-03-22 PROCEDURE — 25010000002 HYDROMORPHONE PER 4 MG: Performed by: FAMILY MEDICINE

## 2025-03-22 PROCEDURE — 85027 COMPLETE CBC AUTOMATED: CPT | Performed by: SURGERY

## 2025-03-22 PROCEDURE — 85652 RBC SED RATE AUTOMATED: CPT | Performed by: SURGERY

## 2025-03-22 PROCEDURE — 25810000003 SODIUM CHLORIDE 0.9 % SOLUTION: Performed by: SURGERY

## 2025-03-22 PROCEDURE — 63710000001 INSULIN LISPRO (HUMAN) PER 5 UNITS: Performed by: SURGERY

## 2025-03-22 PROCEDURE — 25010000002 MORPHINE PER 10 MG: Performed by: INTERNAL MEDICINE

## 2025-03-22 PROCEDURE — 80048 BASIC METABOLIC PNL TOTAL CA: CPT | Performed by: SURGERY

## 2025-03-22 RX ORDER — BISACODYL 5 MG/1
10 TABLET, DELAYED RELEASE ORAL DAILY
Status: DISCONTINUED | OUTPATIENT
Start: 2025-03-22 | End: 2025-03-24 | Stop reason: HOSPADM

## 2025-03-22 RX ORDER — DOCUSATE SODIUM 100 MG/1
100 CAPSULE, LIQUID FILLED ORAL 2 TIMES DAILY
Status: DISCONTINUED | OUTPATIENT
Start: 2025-03-22 | End: 2025-03-24 | Stop reason: HOSPADM

## 2025-03-22 RX ORDER — MORPHINE SULFATE 2 MG/ML
1 INJECTION, SOLUTION INTRAMUSCULAR; INTRAVENOUS EVERY 4 HOURS PRN
Status: DISCONTINUED | OUTPATIENT
Start: 2025-03-22 | End: 2025-03-24 | Stop reason: HOSPADM

## 2025-03-22 RX ADMIN — Medication 10 ML: at 08:11

## 2025-03-22 RX ADMIN — DOXYCYCLINE 100 MG: 100 CAPSULE ORAL at 20:32

## 2025-03-22 RX ADMIN — Medication 10 ML: at 20:32

## 2025-03-22 RX ADMIN — CALCIUM CARBONATE 1 APPLICATION: 500 TABLET, CHEWABLE ORAL at 20:33

## 2025-03-22 RX ADMIN — HYDROMORPHONE HYDROCHLORIDE 0.5 MG: 1 INJECTION, SOLUTION INTRAMUSCULAR; INTRAVENOUS; SUBCUTANEOUS at 00:15

## 2025-03-22 RX ADMIN — MORPHINE SULFATE 1 MG: 2 INJECTION, SOLUTION INTRAMUSCULAR; INTRAVENOUS at 12:44

## 2025-03-22 RX ADMIN — HYDRALAZINE HYDROCHLORIDE 25 MG: 25 TABLET ORAL at 20:32

## 2025-03-22 RX ADMIN — DOXYCYCLINE 100 MG: 100 CAPSULE ORAL at 08:11

## 2025-03-22 RX ADMIN — GABAPENTIN 400 MG: 400 CAPSULE ORAL at 20:32

## 2025-03-22 RX ADMIN — MORPHINE SULFATE 1 MG: 2 INJECTION, SOLUTION INTRAMUSCULAR; INTRAVENOUS at 17:28

## 2025-03-22 RX ADMIN — GABAPENTIN 400 MG: 400 CAPSULE ORAL at 08:11

## 2025-03-22 RX ADMIN — HYDROCODONE BITARTRATE AND ACETAMINOPHEN 1 TABLET: 5; 325 TABLET ORAL at 20:32

## 2025-03-22 RX ADMIN — METOPROLOL SUCCINATE 100 MG: 100 TABLET, EXTENDED RELEASE ORAL at 08:11

## 2025-03-22 RX ADMIN — AMITRIPTYLINE HYDROCHLORIDE 25 MG: 25 TABLET, FILM COATED ORAL at 20:32

## 2025-03-22 RX ADMIN — LANSOPRAZOLE 30 MG: 15 TABLET, ORALLY DISINTEGRATING ORAL at 04:18

## 2025-03-22 RX ADMIN — OXYBUTYNIN CHLORIDE 5 MG: 5 TABLET, EXTENDED RELEASE ORAL at 08:10

## 2025-03-22 RX ADMIN — ACETAMINOPHEN 500 MG: 500 TABLET, FILM COATED ORAL at 20:32

## 2025-03-22 RX ADMIN — CALCIUM CARBONATE 1 APPLICATION: 500 TABLET, CHEWABLE ORAL at 00:50

## 2025-03-22 RX ADMIN — CALCIUM CARBONATE 1 APPLICATION: 500 TABLET, CHEWABLE ORAL at 08:11

## 2025-03-22 RX ADMIN — HYDROCODONE BITARTRATE AND ACETAMINOPHEN 1 TABLET: 5; 325 TABLET ORAL at 14:57

## 2025-03-22 RX ADMIN — TEMAZEPAM 15 MG: 15 CAPSULE ORAL at 23:26

## 2025-03-22 RX ADMIN — ACETAMINOPHEN 500 MG: 500 TABLET, FILM COATED ORAL at 08:10

## 2025-03-22 RX ADMIN — Medication 1 TABLET: at 08:10

## 2025-03-22 RX ADMIN — MORPHINE SULFATE 1 MG: 2 INJECTION, SOLUTION INTRAMUSCULAR; INTRAVENOUS at 22:09

## 2025-03-22 RX ADMIN — SODIUM CHLORIDE 100 ML/HR: 0.9 INJECTION, SOLUTION INTRAVENOUS at 02:17

## 2025-03-22 RX ADMIN — DOCUSATE SODIUM 100 MG: 100 CAPSULE, LIQUID FILLED ORAL at 20:32

## 2025-03-22 RX ADMIN — INSULIN LISPRO 2 UNITS: 100 INJECTION, SOLUTION INTRAVENOUS; SUBCUTANEOUS at 20:32

## 2025-03-22 RX ADMIN — CITALOPRAM HYDROBROMIDE 20 MG: 20 TABLET ORAL at 08:11

## 2025-03-22 RX ADMIN — INSULIN LISPRO 2 UNITS: 100 INJECTION, SOLUTION INTRAVENOUS; SUBCUTANEOUS at 17:28

## 2025-03-22 RX ADMIN — ATORVASTATIN CALCIUM 20 MG: 20 TABLET, FILM COATED ORAL at 08:11

## 2025-03-22 RX ADMIN — DOCUSATE SODIUM 100 MG: 100 CAPSULE, LIQUID FILLED ORAL at 08:11

## 2025-03-22 RX ADMIN — ACETAMINOPHEN 500 MG: 500 TABLET, FILM COATED ORAL at 02:17

## 2025-03-22 RX ADMIN — HYDROCODONE BITARTRATE AND ACETAMINOPHEN 1 TABLET: 5; 325 TABLET ORAL at 07:55

## 2025-03-22 RX ADMIN — INSULIN LISPRO 2 UNITS: 100 INJECTION, SOLUTION INTRAVENOUS; SUBCUTANEOUS at 11:39

## 2025-03-22 RX ADMIN — BISACODYL 10 MG: 5 TABLET, COATED ORAL at 11:39

## 2025-03-22 NOTE — PROGRESS NOTES
[facility] Medicine Services  PROGRESS NOTE    Patient Name: Gaetano Marcelo  : 1960  MRN: [id number]    Date of Admission: 3/19/2025  Primary Care Physician: Tushar Shepherd Jr., MD    Subjective   Subjective     CC:  Abdominal pain    HPI:  S/p open right inguinal hernial repair yesterday in OR per Dr. Naranjo.  Says that he is sore but doing well.  Continues to have sharp pains to his back.  Requests for breakthrough IV pain med to work faster.      Objective   Objective     Vital Signs:   Temp:  [97.1 °F (36.2 °C)-99.2 °F (37.3 °C)] 98 °F (36.7 °C)  Heart Rate:  [82-89] 89  Resp:  [16-20] 20  BP: (113-150)/(72-96) 143/92     Physical Exam:  Constitutional: No acute distress, awake, alert  HENT: NCAT, mucous membranes moist  Respiratory: Clear to auscultation bilaterally, respiratory effort normal   Cardiovascular: RRR, no murmurs, rubs, or gallops  Gastrointestinal: Positive bowel sounds, soft, binder around right groin  Musculoskeletal: No bilateral ankle edema  Psychiatric: Appropriate affect, cooperative  Neurologic: Oriented x 3, CALDERON speech clear  Skin: No rashes      Results Reviewed:  LAB RESULTS:      Lab 25   WBC 6.43 3.48 4.39 4.90   HEMOGLOBIN 12.9* 12.3* 11.6* 12.7*   HEMATOCRIT 40.4 39.7 36.8* 40.1   PLATELETS 231 224 207 250   NEUTROS ABS  --   --  2.36 2.79   IMMATURE GRANS (ABS)  --   --  0.01 0.01   LYMPHS ABS  --   --  1.43 1.50   MONOS ABS  --   --  0.42 0.45   EOS ABS  --   --  0.14 0.11   MCV 86.9 88.0 88.2 88.5   SED RATE 12  --   --   --    CRP  --  <0.30  --   --    LACTATE  --   --   --  1.2         Lab 25   SODIUM 134* 139 138 137   POTASSIUM 4.9 4.1 4.5 5.0   CHLORIDE 103 107 106 103   CO2 18.0* 22.0 24.0 23.0   ANION GAP 13.0 10.0 8.0 11.0   BUN 9 8 14 17   CREATININE 0.74* 0.67* 0.64* 0.92   EGFR 99.9 103.0 104.4 91.7   GLUCOSE 153* 88  89 120*   CALCIUM 8.2* 8.1* 8.1* 8.3*   IONIZED CALCIUM  --  1.12*  --   --    MAGNESIUM  --  1.7  --  1.6   PHOSPHORUS  --  2.9  --   --    HEMOGLOBIN A1C  --   --   --  5.60         Lab 03/21/25  0502 03/20/25  0527 03/19/25  2113   TOTAL PROTEIN 4.8* 4.9* 5.6*   ALBUMIN 3.0* 3.1* 3.5   GLOBULIN 1.8 1.8 2.1   ALT (SGPT) 40 40 50*   AST (SGOT) 38 45* 68*   BILIRUBIN 0.3 0.3 0.2   ALK PHOS 65 65 70   LIPASE  --   --  11*                     Brief Urine Lab Results  (Last result in the past 365 days)        Color   Clarity   Blood   Leuk Est   Nitrite   Protein   CREAT   Urine HCG        08/21/24 1216             49.5                 Microbiology Results Abnormal       None            Peripheral Block  Result Date: 3/21/2025  Lei Hugo CRNA     3/21/2025  4:39 PM Peripheral Block Patient reassessed immediately prior to procedure Patient location during procedure: OR Reason for block: at surgeon's request and post-op pain management Performed by RADHA/CAA: Lei Hugo CRNA Preanesthetic Checklist Completed: patient identified, IV checked, site marked, risks and benefits discussed, surgical consent, monitors and equipment checked, pre-op evaluation and timeout performed Prep: Pt Position: supine Sterile barriers:cap, gloves, mask and washed/disinfected hands Prep: ChloraPrep Patient monitoring: blood pressure monitoring, continuous pulse oximetry and EKG Procedure Sedation: yes Performed under: general Guidance:ultrasound guided Images:still images obtained, printed/placed on chart Laterality:right Block Type:TAP Injection Technique:single-shot Needle Type:short-bevel and echogenic Needle Gauge:20 G Resistance on Injection: none Medications Used: dexamethasone sodium phosphate injection - Injection  2 mg - 3/21/2025 4:23:00 PM bupivacaine PF (MARCAINE) 0.25 % injection - Injection  30 mL - 3/21/2025 4:23:00 PM Medications Comment: Post Assessment Injection Assessment: negative aspiration for heme, incremental  "injection and no paresthesia on injection Patient Tolerance:comfortable throughout block Complications:no Additional Notes Mid-Axillary/Lateral TAPs A high-frequency linear transducer, with sterile cover, was placed in the midaxillary line between the ASIS and costal margin. The External Oblique Muscle (EOM), Internal Oblique Muscle (IOM), Transverse Abdominus Muscle (MARTINS), and Peritoneum were identified. The insertion site was prepped in sterile fashion and then localized with 2-5 ml of 1% Lidocaine. Using ultrasound-guidance, a 20-gauge B-Hart 4\" Ultraplex 360 non-stimulating echogenic needle was advanced in plane, from medial to lateral, until the tip of the needle was in the fascial plane between the IOM and MARTINS. 1-3ml of preservative free normal saline was used to hydro-dissect the fascial planes. After the fascial plane was verified, the local anesthetic (LA) was injected. The procedure was repeated on the opposite side for bilateral coverage. Aspiration every 5 ml to prevent intravascular injection. Injection was completed with negative aspiration of blood and negative intravascular injection. Injection pressures were normal with minimal resistance. Midaxillary TAPs should reach intercostal nerves T10- T11 and the subcostal nerve T12.  Performed by: Brendan Gee Jr., MD           Current medications:  Scheduled Meds:acetaminophen, 500 mg, Oral, Q6H  amitriptyline, 25 mg, Oral, Nightly  atorvastatin, 20 mg, Oral, Daily  bisacodyl, 10 mg, Oral, Daily  citalopram, 20 mg, Oral, Daily  [Held by provider] cloNIDine, 0.1 mg, Oral, Q12H  docusate sodium, 100 mg, Oral, BID  doxycycline, 100 mg, Oral, Q12H  gabapentin, 400 mg, Oral, BID  [Held by provider] hydrALAZINE, 25 mg, Oral, BID  insulin lispro, 2-7 Units, Subcutaneous, 4x Daily AC & at Bedtime  [START ON 3/23/2025] lansoprazole, 30 mg, Oral, Q AM  metoprolol succinate XL, 100 mg, Oral, Daily  miconazole, 1 Application, Topical, Q12H  multivitamin with " minerals, 1 tablet, Oral, Daily  oxybutynin XL, 5 mg, Oral, Daily  sodium chloride, 10 mL, Intravenous, Q12H      Continuous Infusions:sodium chloride, 100 mL/hr, Last Rate: 100 mL/hr (03/22/25 0217)      PRN Meds:.  dextrose    dextrose    glucagon (human recombinant)    HYDROcodone-acetaminophen    melatonin    Morphine    ondansetron    sodium chloride    sodium chloride    temazepam    Assessment & Plan   Assessment & Plan     Active Hospital Problems    Diagnosis  POA    **SBO (small bowel obstruction) [K56.609]  Yes    Anemia [D64.9]  Yes    Diabetic foot ulcer [E11.621, L97.509]  Yes    History of DVT (deep vein thrombosis) [Z86.718]  Not Applicable    GERD (gastroesophageal reflux disease) [K21.9]  Yes    Type 2 diabetes mellitus with complication, without long-term current use of insulin [E11.8]  Yes    Depressive disorder [F32.A]  Yes    Chronic pain disorder [G89.4]  Yes    Essential hypertension [I10]  Yes    Gastroesophageal reflux disease without esophagitis [K21.9]  Yes    Hyperlipidemia [E78.5]  Yes      Resolved Hospital Problems   No resolved problems to display.        Brief Hospital Course to date:  Gaetano Marcelo is a 64 y.o. male     Incarcerated Right Inguinal hernia  - s/p reduction under under sedation in ED  - s/p open right inguinal hernia repair by Dr. Naranjo on 3/21.  Gave Kcentra on 3/20 to reverse xarelto  - doing well, tolerated liquids, surgery advanced to regular diet today    ?osteomyelitis/Diskitis L4/5 and L5/S1  --h/o of this s/p surgery per Dr. Santiago and IV abx per Dr. Donohue in June 2024  --findings again concerning for possible infection on CT abd  --will MRI L spine to look at better to see if this is recurrent infection or changes from previous infection, still pending  --WBC wnl  --CRP and ESR wnl  --follow blood cultures  --continue the doxy that he has been on for now  --consider ID consult pending MRI results       Diabetic Foot Ulcer  - currently on doxycycline.  Continue  - wound care     DM2  - hold oral hypoglycemics  - SSI with scheduled accu checks  - hb A1c 5.60     Anemia  - H/H stable.  - monitor     HTN  HLD  - BP was borderline so holding Clonidine and Hydralazine but starting to creep back up so will restart hydralazine.  Continue metoprolol  - continue statin     GERD  - PPI     History of DVT  PVD  - on ASA and Xarelto. Holding for now.  S/p Kcentra on 3/20 to reverse xarelto on 3/20 per surgery  - resume once cleared by general surgery        Expected Discharge Location and Transportation:   Expected Discharge   Expected Discharge Date: 3/22/2025; Expected Discharge Time:      VTE Prophylaxis:  Mechanical VTE prophylaxis orders are present.         AM-PAC 6 Clicks Score (PT): 22 (03/21/25 2000)    CODE STATUS:   Code Status and Medical Interventions: CPR (Attempt to Resuscitate); Full Support   Ordered at: 03/20/25 0040     Code Status (Patient has no pulse and is not breathing):    CPR (Attempt to Resuscitate)     Medical Interventions (Patient has pulse or is breathing):    Full Support     Level Of Support Discussed With:    Patient       Cash Chin MD  03/22/25

## 2025-03-23 ENCOUNTER — APPOINTMENT (OUTPATIENT)
Dept: MRI IMAGING | Facility: HOSPITAL | Age: 65
DRG: 351 | End: 2025-03-23
Payer: MEDICARE

## 2025-03-23 LAB
ANION GAP SERPL CALCULATED.3IONS-SCNC: 10 MMOL/L (ref 5–15)
BUN SERPL-MCNC: 8 MG/DL (ref 8–23)
BUN/CREAT SERPL: 10.7 (ref 7–25)
CA-I SERPL ISE-MCNC: 1.15 MMOL/L (ref 1.15–1.3)
CALCIUM SPEC-SCNC: 8.1 MG/DL (ref 8.6–10.5)
CHLORIDE SERPL-SCNC: 105 MMOL/L (ref 98–107)
CO2 SERPL-SCNC: 23 MMOL/L (ref 22–29)
CREAT SERPL-MCNC: 0.75 MG/DL (ref 0.76–1.27)
DEPRECATED RDW RBC AUTO: 47.2 FL (ref 37–54)
EGFRCR SERPLBLD CKD-EPI 2021: 99.5 ML/MIN/1.73
ERYTHROCYTE [DISTWIDTH] IN BLOOD BY AUTOMATED COUNT: 14.7 % (ref 12.3–15.4)
GLUCOSE BLDC GLUCOMTR-MCNC: 112 MG/DL (ref 70–130)
GLUCOSE BLDC GLUCOMTR-MCNC: 173 MG/DL (ref 70–130)
GLUCOSE BLDC GLUCOMTR-MCNC: 183 MG/DL (ref 70–130)
GLUCOSE BLDC GLUCOMTR-MCNC: 198 MG/DL (ref 70–130)
GLUCOSE SERPL-MCNC: 137 MG/DL (ref 65–99)
HCT VFR BLD AUTO: 39.2 % (ref 37.5–51)
HGB BLD-MCNC: 12.3 G/DL (ref 13–17.7)
MAGNESIUM SERPL-MCNC: 1.6 MG/DL (ref 1.6–2.4)
MCH RBC QN AUTO: 27.4 PG (ref 26.6–33)
MCHC RBC AUTO-ENTMCNC: 31.4 G/DL (ref 31.5–35.7)
MCV RBC AUTO: 87.3 FL (ref 79–97)
PHOSPHATE SERPL-MCNC: 2.5 MG/DL (ref 2.5–4.5)
PLATELET # BLD AUTO: 223 10*3/MM3 (ref 140–450)
PMV BLD AUTO: 9.8 FL (ref 6–12)
POTASSIUM SERPL-SCNC: 4.5 MMOL/L (ref 3.5–5.2)
RBC # BLD AUTO: 4.49 10*6/MM3 (ref 4.14–5.8)
SODIUM SERPL-SCNC: 138 MMOL/L (ref 136–145)
WBC NRBC COR # BLD AUTO: 8.55 10*3/MM3 (ref 3.4–10.8)

## 2025-03-23 PROCEDURE — 80048 BASIC METABOLIC PNL TOTAL CA: CPT | Performed by: INTERNAL MEDICINE

## 2025-03-23 PROCEDURE — 82948 REAGENT STRIP/BLOOD GLUCOSE: CPT

## 2025-03-23 PROCEDURE — 25010000002 MORPHINE PER 10 MG: Performed by: INTERNAL MEDICINE

## 2025-03-23 PROCEDURE — 83735 ASSAY OF MAGNESIUM: CPT | Performed by: INTERNAL MEDICINE

## 2025-03-23 PROCEDURE — 84100 ASSAY OF PHOSPHORUS: CPT | Performed by: INTERNAL MEDICINE

## 2025-03-23 PROCEDURE — 99232 SBSQ HOSP IP/OBS MODERATE 35: CPT | Performed by: INTERNAL MEDICINE

## 2025-03-23 PROCEDURE — 82330 ASSAY OF CALCIUM: CPT | Performed by: INTERNAL MEDICINE

## 2025-03-23 PROCEDURE — 85027 COMPLETE CBC AUTOMATED: CPT | Performed by: INTERNAL MEDICINE

## 2025-03-23 PROCEDURE — 63710000001 INSULIN LISPRO (HUMAN) PER 5 UNITS: Performed by: SURGERY

## 2025-03-23 PROCEDURE — 72148 MRI LUMBAR SPINE W/O DYE: CPT

## 2025-03-23 RX ADMIN — Medication 1 TABLET: at 08:01

## 2025-03-23 RX ADMIN — Medication 10 ML: at 08:02

## 2025-03-23 RX ADMIN — INSULIN LISPRO 2 UNITS: 100 INJECTION, SOLUTION INTRAVENOUS; SUBCUTANEOUS at 17:53

## 2025-03-23 RX ADMIN — CITALOPRAM HYDROBROMIDE 20 MG: 20 TABLET ORAL at 08:02

## 2025-03-23 RX ADMIN — HYDROCODONE BITARTRATE AND ACETAMINOPHEN 1 TABLET: 5; 325 TABLET ORAL at 15:07

## 2025-03-23 RX ADMIN — OXYBUTYNIN CHLORIDE 5 MG: 5 TABLET, EXTENDED RELEASE ORAL at 08:01

## 2025-03-23 RX ADMIN — CALCIUM CARBONATE 1 APPLICATION: 500 TABLET, CHEWABLE ORAL at 20:06

## 2025-03-23 RX ADMIN — GABAPENTIN 400 MG: 400 CAPSULE ORAL at 08:02

## 2025-03-23 RX ADMIN — ACETAMINOPHEN 500 MG: 500 TABLET, FILM COATED ORAL at 02:26

## 2025-03-23 RX ADMIN — DOXYCYCLINE 100 MG: 100 CAPSULE ORAL at 08:01

## 2025-03-23 RX ADMIN — LANSOPRAZOLE 30 MG: 15 TABLET, ORALLY DISINTEGRATING ORAL at 05:44

## 2025-03-23 RX ADMIN — ATORVASTATIN CALCIUM 20 MG: 20 TABLET, FILM COATED ORAL at 08:01

## 2025-03-23 RX ADMIN — INSULIN LISPRO 2 UNITS: 100 INJECTION, SOLUTION INTRAVENOUS; SUBCUTANEOUS at 12:24

## 2025-03-23 RX ADMIN — DOXYCYCLINE 100 MG: 100 CAPSULE ORAL at 20:06

## 2025-03-23 RX ADMIN — INSULIN LISPRO 2 UNITS: 100 INJECTION, SOLUTION INTRAVENOUS; SUBCUTANEOUS at 20:05

## 2025-03-23 RX ADMIN — ACETAMINOPHEN 500 MG: 500 TABLET, FILM COATED ORAL at 20:05

## 2025-03-23 RX ADMIN — HYDRALAZINE HYDROCHLORIDE 25 MG: 25 TABLET ORAL at 20:05

## 2025-03-23 RX ADMIN — BISACODYL 10 MG: 5 TABLET, COATED ORAL at 08:02

## 2025-03-23 RX ADMIN — DOCUSATE SODIUM 100 MG: 100 CAPSULE, LIQUID FILLED ORAL at 20:05

## 2025-03-23 RX ADMIN — MORPHINE SULFATE 1 MG: 2 INJECTION, SOLUTION INTRAMUSCULAR; INTRAVENOUS at 03:53

## 2025-03-23 RX ADMIN — MORPHINE SULFATE 1 MG: 2 INJECTION, SOLUTION INTRAMUSCULAR; INTRAVENOUS at 12:25

## 2025-03-23 RX ADMIN — GABAPENTIN 400 MG: 400 CAPSULE ORAL at 20:05

## 2025-03-23 RX ADMIN — AMITRIPTYLINE HYDROCHLORIDE 25 MG: 25 TABLET, FILM COATED ORAL at 20:05

## 2025-03-23 RX ADMIN — HYDROCODONE BITARTRATE AND ACETAMINOPHEN 1 TABLET: 5; 325 TABLET ORAL at 08:02

## 2025-03-23 RX ADMIN — Medication 10 ML: at 20:06

## 2025-03-23 RX ADMIN — ACETAMINOPHEN 500 MG: 500 TABLET, FILM COATED ORAL at 08:02

## 2025-03-23 RX ADMIN — CALCIUM CARBONATE 1 APPLICATION: 500 TABLET, CHEWABLE ORAL at 08:01

## 2025-03-23 RX ADMIN — DOCUSATE SODIUM 100 MG: 100 CAPSULE, LIQUID FILLED ORAL at 08:01

## 2025-03-23 RX ADMIN — HYDRALAZINE HYDROCHLORIDE 25 MG: 25 TABLET ORAL at 08:02

## 2025-03-23 RX ADMIN — MORPHINE SULFATE 1 MG: 2 INJECTION, SOLUTION INTRAMUSCULAR; INTRAVENOUS at 08:01

## 2025-03-23 RX ADMIN — METOPROLOL SUCCINATE 100 MG: 100 TABLET, EXTENDED RELEASE ORAL at 08:01

## 2025-03-23 RX ADMIN — MORPHINE SULFATE 1 MG: 2 INJECTION, SOLUTION INTRAMUSCULAR; INTRAVENOUS at 20:10

## 2025-03-23 NOTE — PROGRESS NOTES
"    [facility] Medicine Services  PROGRESS NOTE    Patient Name: Gaetano Marcelo  : 1960  MRN: [id number]    Date of Admission: 3/19/2025  Primary Care Physician: Tushar Shepherd Jr., MD    Subjective   Subjective     CC:  Abdominal pain    HPI:  \"Not too good.\"  States that he's still really sore from the surgery and would like to stay until tomorrow.  Tolerating regular diet      Objective   Objective     Vital Signs:   Temp:  [97.8 °F (36.6 °C)-98.8 °F (37.1 °C)] 97.8 °F (36.6 °C)  Heart Rate:  [81-91] 84  Resp:  [16-18] 18  BP: (107-135)/(76-85) 113/77     Physical Exam:  Constitutional: No acute distress, awake, alert, wife at bedside  HENT: NCAT, mucous membranes moist  Respiratory: Clear to auscultation bilaterally, respiratory effort normal   Cardiovascular: RRR, no murmurs, rubs, or gallops  Gastrointestinal: Positive bowel sounds, soft, binder around right groin  Musculoskeletal: No bilateral ankle edema  Psychiatric: Appropriate affect, cooperative  Neurologic: Oriented x 3, CALDERON speech clear  Skin: No rashes      Results Reviewed:  LAB RESULTS:      Lab 25  0416 25  0425 25  0502 25  0527 25  2113   WBC 8.55 6.43 3.48 4.39 4.90   HEMOGLOBIN 12.3* 12.9* 12.3* 11.6* 12.7*   HEMATOCRIT 39.2 40.4 39.7 36.8* 40.1   PLATELETS 223 231 224 207 250   NEUTROS ABS  --   --   --  2.36 2.79   IMMATURE GRANS (ABS)  --   --   --  0.01 0.01   LYMPHS ABS  --   --   --  1.43 1.50   MONOS ABS  --   --   --  0.42 0.45   EOS ABS  --   --   --  0.14 0.11   MCV 87.3 86.9 88.0 88.2 88.5   SED RATE  --  12  --   --   --    CRP  --   --  <0.30  --   --    LACTATE  --   --   --   --  1.2         Lab 25  0416 25  0425 25  0502 25  0527 25  2113   SODIUM 138 134* 139 138 137   POTASSIUM 4.5 4.9 4.1 4.5 5.0   CHLORIDE 105 103 107 106 103   CO2 23.0 18.0* 22.0 24.0 23.0   ANION GAP 10.0 13.0 10.0 8.0 11.0   BUN 8 9 8 14 17   CREATININE 0.75* " 0.74* 0.67* 0.64* 0.92   EGFR 99.5 99.9 103.0 104.4 91.7   GLUCOSE 137* 153* 88 89 120*   CALCIUM 8.1* 8.2* 8.1* 8.1* 8.3*   IONIZED CALCIUM 1.15  --  1.12*  --   --    MAGNESIUM 1.6  --  1.7  --  1.6   PHOSPHORUS 2.5  --  2.9  --   --    HEMOGLOBIN A1C  --   --   --   --  5.60         Lab 03/21/25  0502 03/20/25  0527 03/19/25  2113   TOTAL PROTEIN 4.8* 4.9* 5.6*   ALBUMIN 3.0* 3.1* 3.5   GLOBULIN 1.8 1.8 2.1   ALT (SGPT) 40 40 50*   AST (SGOT) 38 45* 68*   BILIRUBIN 0.3 0.3 0.2   ALK PHOS 65 65 70   LIPASE  --   --  11*                     Brief Urine Lab Results  (Last result in the past 365 days)        Color   Clarity   Blood   Leuk Est   Nitrite   Protein   CREAT   Urine HCG        08/21/24 1216             49.5                 Microbiology Results Abnormal       None            MRI Lumbar Spine Without Contrast  Result Date: 3/23/2025  MRI LUMBAR SPINE WO CONTRAST Date of Exam: 3/23/2025 3:29 AM EDT Indication: concern for possible osteo L4/5, L5/S1 on CT abd/pelvis.  Comparison: CT abdomen pelvis 6/10/2024 and lumbar spine MRI 6/12/2024. Technique:  Routine multiplanar/multisequence sequence images of the lumbar spine were obtained without contrast administration.  Findings: Five lumbar type vertebral bodies are identified. Since prior MRI, there has been interval placement of a posterior fusion construct at the L4-S1 levels. There is stable severe narrowing of the L4-L5 and L5-S1 discs along with stable severe erosive endplate changes with marrow signal abnormalities compatible with degenerative change. Given the relatively stable appearance since 2024, this is felt to be due to degenerative changes rather than osteomyelitis. There is no paraspinal edema. There is mild narrowing of the other lumbar discs. Vertebral bodies maintain normal height. Distal spinal cord and conus medullaris appear unremarkable terminating at the lower L1 level.  Cauda equina appears unremarkable. No focal bone marrow edema or  evidence of a marrow replacing process. Paraspinal musculature is preserved.  Retroperitoneal structures appear unremarkable. L1-L2: The disc is unremarkable. There is mild facet and ligamentum flavum hypertrophy without neuroforaminal or spinal canal narrowing. L2-L3: The disc is unremarkable. There is moderate facet and ligamentum flavum hypertrophy without neuroforaminal or spinal canal narrowing. L3-L4: The disc is unremarkable. There is moderate facet and ligamentum flavum hypertrophy without neuroforaminal or spinal canal narrowing. L4-L5: There is stable 3 mm anterolisthesis. There is fusion at this level with dorsal decompression through laminectomy. There is residual facet arthropathy and there is mild residual bilateral neuroforaminal narrowing without spinal canal stenosis. L5-S1: There is fusion at this level and dorsal decompression via laminectomy. There is extensive residual disc osteophyte complex and residual facet arthropathy without definite neuroforaminal or spinal canal stenosis.     Impression: Impression: 1.Interval posterior fusion and laminectomy at the L4-S1 levels. 2.Stable severe degenerative disc disease at L4-L5 and L5-S1 with stable erosive endplate changes and marrow signal abnormalities. Given the relatively stable appearance since 2024, this is felt to be due to degenerative change rather than osteomyelitis. 3.Stable mild anterolisthesis of L4 on L5. Electronically Signed: Christopher Garzon MD  3/23/2025 4:02 AM EDT  Workstation ID: [id number]    Peripheral Block  Result Date: 3/21/2025  Lei Hugo CRNA     3/21/2025  4:39 PM Peripheral Block Patient reassessed immediately prior to procedure Patient location during procedure: OR Reason for block: at surgeon's request and post-op pain management Performed by RADHA/CAA: Lei Hugo CRNA Preanesthetic Checklist Completed: patient identified, IV checked, site marked, risks and benefits discussed, surgical consent, monitors and  "equipment checked, pre-op evaluation and timeout performed Prep: Pt Position: supine Sterile barriers:cap, gloves, mask and washed/disinfected hands Prep: ChloraPrep Patient monitoring: blood pressure monitoring, continuous pulse oximetry and EKG Procedure Sedation: yes Performed under: general Guidance:ultrasound guided Images:still images obtained, printed/placed on chart Laterality:right Block Type:TAP Injection Technique:single-shot Needle Type:short-bevel and echogenic Needle Gauge:20 G Resistance on Injection: none Medications Used: dexamethasone sodium phosphate injection - Injection  2 mg - 3/21/2025 4:23:00 PM bupivacaine PF (MARCAINE) 0.25 % injection - Injection  30 mL - 3/21/2025 4:23:00 PM Medications Comment: Post Assessment Injection Assessment: negative aspiration for heme, incremental injection and no paresthesia on injection Patient Tolerance:comfortable throughout block Complications:no Additional Notes Mid-Axillary/Lateral TAPs A high-frequency linear transducer, with sterile cover, was placed in the midaxillary line between the ASIS and costal margin. The External Oblique Muscle (EOM), Internal Oblique Muscle (IOM), Transverse Abdominus Muscle (MARTINS), and Peritoneum were identified. The insertion site was prepped in sterile fashion and then localized with 2-5 ml of 1% Lidocaine. Using ultrasound-guidance, a 20-gauge B-Hart 4\" Ultraplex 360 non-stimulating echogenic needle was advanced in plane, from medial to lateral, until the tip of the needle was in the fascial plane between the IOM and MARTINS. 1-3ml of preservative free normal saline was used to hydro-dissect the fascial planes. After the fascial plane was verified, the local anesthetic (LA) was injected. The procedure was repeated on the opposite side for bilateral coverage. Aspiration every 5 ml to prevent intravascular injection. Injection was completed with negative aspiration of blood and negative intravascular injection. Injection " pressures were normal with minimal resistance. Midaxillary TAPs should reach intercostal nerves T10- T11 and the subcostal nerve T12.  Performed by: Brendan Gee Jr., MD           Current medications:  Scheduled Meds:acetaminophen, 500 mg, Oral, Q6H  amitriptyline, 25 mg, Oral, Nightly  atorvastatin, 20 mg, Oral, Daily  bisacodyl, 10 mg, Oral, Daily  citalopram, 20 mg, Oral, Daily  [Held by provider] cloNIDine, 0.1 mg, Oral, Q12H  docusate sodium, 100 mg, Oral, BID  doxycycline, 100 mg, Oral, Q12H  gabapentin, 400 mg, Oral, BID  hydrALAZINE, 25 mg, Oral, BID  insulin lispro, 2-7 Units, Subcutaneous, 4x Daily AC & at Bedtime  lansoprazole, 30 mg, Oral, Q AM  metoprolol succinate XL, 100 mg, Oral, Daily  miconazole, 1 Application, Topical, Q12H  multivitamin with minerals, 1 tablet, Oral, Daily  oxybutynin XL, 5 mg, Oral, Daily  sodium chloride, 10 mL, Intravenous, Q12H      Continuous Infusions:     PRN Meds:.  dextrose    dextrose    glucagon (human recombinant)    HYDROcodone-acetaminophen    melatonin    Morphine    ondansetron    sodium chloride    sodium chloride    temazepam    Assessment & Plan   Assessment & Plan     Active Hospital Problems    Diagnosis  POA    **SBO (small bowel obstruction) [K56.609]  Yes    Anemia [D64.9]  Yes    Diabetic foot ulcer [E11.621, L97.509]  Yes    History of DVT (deep vein thrombosis) [Z86.718]  Not Applicable    GERD (gastroesophageal reflux disease) [K21.9]  Yes    Type 2 diabetes mellitus with complication, without long-term current use of insulin [E11.8]  Yes    Depressive disorder [F32.A]  Yes    Chronic pain disorder [G89.4]  Yes    Essential hypertension [I10]  Yes    Gastroesophageal reflux disease without esophagitis [K21.9]  Yes    Hyperlipidemia [E78.5]  Yes      Resolved Hospital Problems   No resolved problems to display.        Brief Hospital Course to date:  Gaetano Marcelo is a 64 y.o. male     Incarcerated Right Inguinal hernia  - s/p reduction under under  sedation in ED  - s/p open right inguinal hernia repair by Dr. Narnajo on 3/21.  Gave Kcentra on 3/20 to reverse xarelto  - doing well, tolerating regular diet.  Ok with surgery to discharge home, f/u with Dr. Naranjo in 2 weeks, no lifting >10 pounds x 6 weeks  - patient still very sore and wishes to stay until tomorrow morning for pain control    osteomyelitis/Diskitis L4/5 and L5/S1 ruled out  Stable Severe DDD  --h/o of this s/p surgery per Dr. Santiago and IV abx per Dr. Donohue in June 2024  --findings again concerning for possible infection on CT abd  --MRI L spine shows severe stable DDD felt to be arthritis change rather than osteo  --WBC wnl  --CRP and ESR wnl  --follow blood cultures  --continue the home doxy       Diabetic Foot Ulcer  - currently on doxycycline. Continue  - wound care     DM2  - hold oral hypoglycemics  - SSI with scheduled accu checks  - hb A1c 5.60     Anemia  - H/H stable.  - monitor     HTN  HLD  - BP was borderline so holding Clonidine and Hydralazine but starting to creep back up so will restart hydralazine.  Continue metoprolol  - continue statin     GERD  - PPI     History of DVT  PVD  - on ASA S/p Kcentra on 3/20 to reverse xarelto on 3/20 per surgery  - ok to restart xarelto per surgery        Expected Discharge Location and Transportation:   Expected Discharge   Expected Discharge Date: 3/24/2025; Expected Discharge Time:      VTE Prophylaxis:  Mechanical VTE prophylaxis orders are present.         AM-PAC 6 Clicks Score (PT): 22 (03/22/25 2000)    CODE STATUS:   Code Status and Medical Interventions: CPR (Attempt to Resuscitate); Full Support   Ordered at: 03/20/25 0040     Code Status (Patient has no pulse and is not breathing):    CPR (Attempt to Resuscitate)     Medical Interventions (Patient has pulse or is breathing):    Full Support     Level Of Support Discussed With:    Patient       Cash Chin MD  03/23/25
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
## 2025-03-24 ENCOUNTER — READMISSION MANAGEMENT (OUTPATIENT)
Dept: CALL CENTER | Facility: HOSPITAL | Age: 65
End: 2025-03-24
Payer: MEDICARE

## 2025-03-24 VITALS
DIASTOLIC BLOOD PRESSURE: 90 MMHG | SYSTOLIC BLOOD PRESSURE: 132 MMHG | OXYGEN SATURATION: 99 % | WEIGHT: 232 LBS | HEIGHT: 72 IN | TEMPERATURE: 98.4 F | HEART RATE: 82 BPM | RESPIRATION RATE: 16 BRPM | BODY MASS INDEX: 31.42 KG/M2

## 2025-03-24 LAB
GLUCOSE BLDC GLUCOMTR-MCNC: 161 MG/DL (ref 70–130)
GLUCOSE BLDC GLUCOMTR-MCNC: 88 MG/DL (ref 70–130)
QT INTERVAL: 380 MS
QTC INTERVAL: 441 MS

## 2025-03-24 PROCEDURE — 82948 REAGENT STRIP/BLOOD GLUCOSE: CPT

## 2025-03-24 PROCEDURE — 63710000001 INSULIN LISPRO (HUMAN) PER 5 UNITS: Performed by: SURGERY

## 2025-03-24 PROCEDURE — 97530 THERAPEUTIC ACTIVITIES: CPT

## 2025-03-24 PROCEDURE — 99239 HOSP IP/OBS DSCHRG MGMT >30: CPT | Performed by: INTERNAL MEDICINE

## 2025-03-24 RX ORDER — HYDROCODONE BITARTRATE AND ACETAMINOPHEN 5; 325 MG/1; MG/1
1 TABLET ORAL EVERY 6 HOURS PRN
Qty: 12 TABLET | Refills: 0 | Status: SHIPPED | OUTPATIENT
Start: 2025-03-24

## 2025-03-24 RX ORDER — TEMAZEPAM 15 MG/1
15 CAPSULE ORAL NIGHTLY PRN
Start: 2025-03-24 | End: 2025-03-24

## 2025-03-24 RX ORDER — TEMAZEPAM 15 MG/1
15 CAPSULE ORAL NIGHTLY PRN
Start: 2025-03-24

## 2025-03-24 RX ADMIN — ATORVASTATIN CALCIUM 20 MG: 20 TABLET, FILM COATED ORAL at 08:16

## 2025-03-24 RX ADMIN — ACETAMINOPHEN 500 MG: 500 TABLET, FILM COATED ORAL at 02:51

## 2025-03-24 RX ADMIN — OXYBUTYNIN CHLORIDE 5 MG: 5 TABLET, EXTENDED RELEASE ORAL at 08:16

## 2025-03-24 RX ADMIN — GABAPENTIN 400 MG: 400 CAPSULE ORAL at 08:16

## 2025-03-24 RX ADMIN — ACETAMINOPHEN 500 MG: 500 TABLET, FILM COATED ORAL at 08:16

## 2025-03-24 RX ADMIN — BISACODYL 10 MG: 5 TABLET, COATED ORAL at 08:16

## 2025-03-24 RX ADMIN — HYDROCODONE BITARTRATE AND ACETAMINOPHEN 1 TABLET: 5; 325 TABLET ORAL at 00:18

## 2025-03-24 RX ADMIN — TEMAZEPAM 15 MG: 15 CAPSULE ORAL at 00:18

## 2025-03-24 RX ADMIN — DOXYCYCLINE 100 MG: 100 CAPSULE ORAL at 08:16

## 2025-03-24 RX ADMIN — HYDRALAZINE HYDROCHLORIDE 25 MG: 25 TABLET ORAL at 08:16

## 2025-03-24 RX ADMIN — Medication 1 TABLET: at 08:16

## 2025-03-24 RX ADMIN — INSULIN LISPRO 2 UNITS: 100 INJECTION, SOLUTION INTRAVENOUS; SUBCUTANEOUS at 12:09

## 2025-03-24 RX ADMIN — METOPROLOL SUCCINATE 100 MG: 100 TABLET, EXTENDED RELEASE ORAL at 08:16

## 2025-03-24 RX ADMIN — CITALOPRAM HYDROBROMIDE 20 MG: 20 TABLET ORAL at 08:16

## 2025-03-24 RX ADMIN — LANSOPRAZOLE 30 MG: 15 TABLET, ORALLY DISINTEGRATING ORAL at 05:06

## 2025-03-24 RX ADMIN — HYDROCODONE BITARTRATE AND ACETAMINOPHEN 1 TABLET: 5; 325 TABLET ORAL at 08:16

## 2025-03-24 RX ADMIN — CALCIUM CARBONATE 1 APPLICATION: 500 TABLET, CHEWABLE ORAL at 08:17

## 2025-03-24 RX ADMIN — DOCUSATE SODIUM 100 MG: 100 CAPSULE, LIQUID FILLED ORAL at 08:16

## 2025-03-24 RX ADMIN — Medication 10 ML: at 08:17

## 2025-03-24 NOTE — CASE MANAGEMENT/SOCIAL WORK
Case Management Discharge Note      Final Note: Spoke with patient at bedside. Patient discharged home today. We discussed home health and discharge needs. He wanted sock assistant tool and was agreeable to home health. CM made referral to [facility] for SN, PT, and OT. Order in epic. Spoke with [facility], they did accept patient for home health. There is no other discharge need at this time.         Selected Continued Care - Admitted Since 3/19/2025       Destination    No services have been selected for the patient.                Durable Medical Equipment    No services have been selected for the patient.                Dialysis/Infusion    No services have been selected for the patient.                Home Medical Care Coordination complete.      Service Provider Services Address Phone Fax Patient Preferred    [facility] [address], [address], [place] [id number] [phone] -- --              Therapy    No services have been selected for the patient.                Community Resources    No services have been selected for the patient.                Community & DME    No services have been selected for the patient.                         Final Discharge Disposition Code: 06 - home with home health care

## 2025-03-24 NOTE — PROGRESS NOTES
"Patient Name:  Gaetano Marcelo  YOB: 1960  [id number]    Surgery Progress Note    Date of visit: 3/24/2025      Subjective: Doing well.  Pain is better controlled.  Tolerating diet without difficulty          Objective:     /90 (BP Location: Right arm, Patient Position: Lying)   Pulse 82   Temp 98.4 °F (36.9 °C) (Oral)   Resp 16   Ht 182.9 cm (72\")   Wt 105 kg (232 lb)   SpO2 99%   BMI 31.46 kg/m²     Intake/Output Summary (Last 24 hours) at 3/24/2025 0906  Last data filed at 3/24/2025 0731  Gross per 24 hour   Intake --   Output 900 ml   Net -900 ml     GEN:   Awake, alert, in no acute distress, resting comfortably in bed   CV:      Regular rate and rhythm  L:         Symmetric expansion, not labored on room air  Abd:    Soft, appropriately tender to palpation along incision, right groin incision is well-approximated without significant swelling or bruising, there is some appropriate tenderness palpation along the site  Ext:     No cyanosis, clubbing, or edema    Recent labs that are back at this time have been reviewed.           Assessment/ Plan:    Mr. Marcelo is a 64-year-old gentleman who was admitted due to concern for an incarcerated right inguinal hernia that was ultimately reduced in the emergency department and he underwent open right inguinal hernia repair with mesh placement by Dr. Naranjo on March 21     # Right inguinal hernia  -Postop day 3 following open right inguinal hernia repair with Dr. Naranjo  -Doing well.  Tolerating diet.  Pain is better controlled.  -Okay to restart Xarelto from my standpoint  -Okay for discharge from a general surgery standpoint.  Should follow-up with Dr. Naranjo in 2 weeks.  No lifting more than 10 pounds for the next 6 weeks       Jake Stack MD  3/24/2025  09:55 EDT      "

## 2025-03-24 NOTE — THERAPY DISCHARGE NOTE
Patient Name: Gaetano Marcelo  : 1960    MRN: [id number]                              Today's Date: 3/24/2025       Admit Date: 3/19/2025    Visit Dx:     ICD-10-CM ICD-9-CM   1. Incarcerated right inguinal hernia  K40.30 550.10   2. Right lower quadrant abdominal pain  R10.31 789.03   3. Right inguinal hernia  K40.90 550.90   4. Type 2 diabetes mellitus with complication, without long-term current use of insulin  E11.8 250.90   5. Chronic pain disorder  G89.4 338.4   6. Depressive disorder  F32.A 311   7. Morbid obesity  E66.01 278.01   8. Gastroesophageal reflux disease without esophagitis  K21.9 530.81   9. SBO (small bowel obstruction)  K56.609 560.9   10. Primary insomnia  F51.01 307.42     Patient Active Problem List   Diagnosis    Type 2 diabetes mellitus with complication, without long-term current use of insulin    Chronic pain disorder    Depressive disorder    Morbid obesity    Gastroesophageal reflux disease without esophagitis    Hyperlipidemia    Essential hypertension    Insomnia    Low back pain    GERD (gastroesophageal reflux disease)    Hypertension    Osteoarthritis    Pain, phantom limb    Peripheral edema    History of DVT (deep vein thrombosis)    Elevated LFTs    Diarrhea    Dyspnea    SBO (small bowel obstruction)    Anemia    Diabetic foot ulcer     Past Medical History:   Diagnosis Date    GERD (gastroesophageal reflux disease)     Hyperlipidemia     Hypertension     Insomnia     Low back pain     Osteoarthritis     Pain, phantom limb     Peripheral edema     Type 2 diabetes mellitus      Past Surgical History:   Procedure Laterality Date    APPENDECTOMY      CARPAL TUNNEL RELEASE Left 2020    Dr. Yosef Diaz    CHOLECYSTECTOMY      HERNIA REPAIR      INGUINAL HERNIA REPAIR Right 3/21/2025    Procedure: INGUINAL HERNIA REPAIR WITH MESH RIGHT;  Surgeon: Han Naranjo MD;  Location: [facility];  Service: General;  Laterality: Right;    REPLACEMENT TOTAL KNEE Right      KANWAL-EN-Y PROCEDURE  2004    TOE AMPUTATION Right 2013    1st, 2nd Rt toes    ULNAR NERVE DECOMPRESSION Left 08/18/2020    Dr. Yosef Diaz      General Information       Row Name 03/24/25 1122          Physical Therapy Time and Intention    Document Type discharge treatment  -SD     Mode of Treatment physical therapy;individual therapy  -SD       Row Name 03/24/25 1122          General Information    Existing Precautions/Restrictions fall  RLE heel WB for transfers, and NWB for ambulation per pt  -SD       Row Name 03/24/25 1122          Cognition    Orientation Status (Cognition) oriented x 4  -SD       Row Name 03/24/25 1122          Safety Issues/Impairments Affecting Functional Mobility    Safety Issues Affecting Function (Mobility) safety precautions follow-through/compliance  -SD     Impairments Affecting Function (Mobility) range of motion (ROM);strength  -SD               User Key  (r) = Recorded By, (t) = Taken By, (c) = Cosigned By      Initials Name Provider Type    SD Mariah Montes, PT Physical Therapist                   Mobility       Row Name 03/24/25 1157          Bed Mobility    Bed Mobility supine-sit  -SD     Supine-Sit [place] (Bed Mobility) standby assist  -SD     Assistive Device (Bed Mobility) head of bed elevated;bed rails  -SD     Comment, (Bed Mobility) increased time and effort needed  -SD       Row Name 03/24/25 1157          Transfers    Comment, (Transfers) not assessed this date  -SD               User Key  (r) = Recorded By, (t) = Taken By, (c) = Cosigned By      Initials Name Provider Type    SD Mariah Montes, PT Physical Therapist                   Obj/Interventions       Row Name 03/24/25 1158          Range of Motion Comprehensive    Comment, General Range of Motion decreased R hip ROM, unable to perform figure 4 for donning sock  -SD       Row Name 03/24/25 1158          Balance    Static Sitting Balance independent  -SD     Dynamic Sitting Balance modified  independence  -SD     Position, Sitting Balance unsupported;sitting edge of bed  -SD     Comment, Balance no LOB while using sock aid to don socks  -SD               User Key  (r) = Recorded By, (t) = Taken By, (c) = Cosigned By      Initials Name Provider Type    Mariah Jones PT Physical Therapist                   Goals/Plan       Row Name 03/24/25 1202          Bed Mobility Goal 1 (PT)    Activity/Assistive Device (Bed Mobility Goal 1, PT) sit to supine/supine to sit  -SD     [place] Level/Cues Needed (Bed Mobility Goal 1, PT) modified independence  -SD     Time Frame (Bed Mobility Goal 1, PT) short term goal (STG);5 days  -SD     Progress/Outcomes (Bed Mobility Goal 1, PT) discharged from facility  -SD       Row Name 03/24/25 1202          Transfer Goal 1 (PT)    Activity/Assistive Device (Transfer Goal 1, PT) sit-to-stand/stand-to-sit;bed-to-chair/chair-to-bed  -SD     [place] Level/Cues Needed (Transfer Goal 1, PT) standby assist  -SD     Time Frame (Transfer Goal 1, PT) long term goal (LTG);10 days  -SD     Progress/Outcome (Transfer Goal 1, PT) discharged from [facility]  -SD       Row Name 03/24/25 1202          Gait Training Goal 1 (PT)    Activity/Assistive Device (Gait Training Goal 1, PT) gait (walking locomotion);assistive device use;other (see comments)  -SD     [place] Level (Gait Training Goal 1, PT) standby assist  -SD     Distance (Gait Training Goal 1, PT) 400ft  -SD     Time Frame (Gait Training Goal 1, PT) long term goal (LTG);10 days  -SD     Progress/Outcome (Gait Training Goal 1, PT) discharged from facility  -SD               User Key  (r) = Recorded By, (t) = Taken By, (c) = Cosigned By      Initials Name Provider Type    Mariah Jones PT Physical Therapist                   Clinical Impression       Row Name 03/24/25 1159          Pain    Pretreatment Pain Rating 0/10 - no pain  -SD     Posttreatment Pain Rating 0/10 - no pain  -SD       Row Name  03/24/25 1159          Plan of Care Review    Plan of Care Reviewed With patient;spouse  -SD     Outcome Evaluation Pt discharging home today, however is requesting a sock aid. Pt transfered to EOB on his own and dem good safety awareness and no LOB while working with sock aid to don socks EOB. Pt dem compentency and teachback with LLE, however unable to successfully don R sock due to bandage on foot. Pt will benefit from HHPT/OT to continue to work on mobility and ADL's once discharged home.  -SD       Row Name 03/24/25 1159          Positioning and Restraints    Pre-Treatment Position in bed  -SD     Post Treatment Position bed  -SD     In Bed notified nsg;sitting EOB;call light within reach;encouraged to call for assist;with family/caregiver  -SD               User Key  (r) = Recorded By, (t) = Taken By, (c) = Cosigned By      Initials Name Provider Type    Mariah Jones PT Physical Therapist                   Outcome Measures       Row Name 03/24/25 1203 03/24/25 0817       How much help from another person do you currently need...    Turning from your back to your side while in flat bed without using bedrails? 4  -SD 4  -HG    Moving from lying on back to sitting on the side of a flat bed without bedrails? 4  -SD 4  -HG    Moving to and from a bed to a chair (including a wheelchair)? 3  -SD 4  -HG    Standing up from a chair using your arms (e.g., wheelchair, bedside chair)? 3  -SD 4  -HG    Climbing 3-5 steps with a railing? 2  -SD 3  -HG    To walk in hospital room? 3  -SD 3  -HG    AM-PAC 6 Clicks Score (PT) 19  -SD 22  -HG    Highest Level of Mobility Goal 6 --> Walk 10 steps or more  -SD 7 --> Walk 25 feet or more  -HG      Row Name 03/24/25 1203          Functional Assessment    Outcome Measure Options AM-PAC 6 Clicks Basic Mobility (PT)  -SD               User Key  (r) = Recorded By, (t) = Taken By, (c) = Cosigned By      Initials Name Provider Type    Mariah Jones PT Physical  Therapist    Latoya Hawkins RN Registered Nurse                  Physical Therapy Education       Title: PT OT SLP Therapies (In Progress)       Topic: Physical Therapy (Done)       Point: Mobility training (Done)       Learning Progress Summary            Patient Acceptance, E,D,TB, VU,DU by SD at 3/24/2025 1203    Acceptance, E, NR by AE at 3/21/2025 1112   Family Acceptance, E,D,TB, VU,DU by SD at 3/24/2025 1203                      Point: Home exercise program (Done)       Learning Progress Summary            Patient Acceptance, E,D,TB, VU,DU by SD at 3/24/2025 1203   Family Acceptance, E,D,TB, VU,DU by SD at 3/24/2025 1203                      Point: Body mechanics (Done)       Learning Progress Summary            Patient Acceptance, E,D,TB, VU,DU by SD at 3/24/2025 1203    Acceptance, E, NR by AE at 3/21/2025 1112   Family Acceptance, E,D,TB, VU,DU by SD at 3/24/2025 1203                      Point: Precautions (Done)       Learning Progress Summary            Patient Acceptance, E,D,TB, VU,DU by SD at 3/24/2025 1203    Acceptance, E, NR by AE at 3/21/2025 1112   Family Acceptance, E,D,TB, VU,DU by SD at 3/24/2025 1203                                      User Key       Initials Effective Dates Name Provider Type Discipline    SD 03/13/23 -  Mariah Montes, PT Physical Therapist PT    AE 09/21/21 -  Johnny Wilkinson PT Physical Therapist PT                  PT Recommendation and Plan     Outcome Evaluation: Pt discharging home today, however is requesting a sock aid. Pt transfered to EOB on his own and dem good safety awareness and no LOB while working with sock aid to don socks EOB. Pt dem compentency and teachback with LLE, however unable to successfully don R sock due to bandage on foot. Pt will benefit from HHPT/OT to continue to work on mobility and ADL's once discharged home.     Time Calculation:         PT Charges       Row Name 03/24/25 1203             Time Calculation    Start Time 1122   -SD      PT Received On 03/24/25  -SD      PT Goal Re-Cert Due Date 03/31/25  -SD         Time Calculation- PT    Total Timed Code Minutes- PT 15 minute(s)  -SD         Timed Charges    82754 - PT Therapeutic Activity Minutes 15  -SD         Total Minutes    Timed Charges Total Minutes 15  -SD       Total Minutes 15  -SD                User Key  (r) = Recorded By, (t) = Taken By, (c) = Cosigned By      Initials Name Provider Type    Mariah Jones, PT Physical Therapist                  Therapy Charges for Today       Code Description Service Date Service Provider Modifiers Qty    [id number] HC PT THERAPEUTIC ACT EA 15 MIN 3/24/2025 Mariah Montes, PT GP 1            PT G-Codes  Outcome Measure Options: AM-PAC 6 Clicks Basic Mobility (PT)  AM-PAC 6 Clicks Score (PT): 19  AM-PAC 6 Clicks Score (OT): 16    PT Discharge Summary  Anticipated Discharge Disposition (PT): home with assist, home with home health    Mariah Montes, ABDI  3/24/2025

## 2025-03-24 NOTE — PLAN OF CARE
Goal Outcome Evaluation:  Plan of Care Reviewed With: patient, spouse           Outcome Evaluation: Pt discharging home today, however is requesting a sock aid. Pt transfered to EOB on his own and dem good safety awareness and no LOB while working with sock aid to don socks EOB. Pt dem compentency and teachback with LLE, however unable to successfully don R sock due to bandage on foot. Pt will benefit from HHPT/OT to continue to work on mobility and ADL's once discharged home.    Anticipated Discharge Disposition (PT): home with assist, home with home health

## 2025-03-25 LAB
CYTO UR: NORMAL
LAB AP CASE REPORT: NORMAL
LAB AP CLINICAL INFORMATION: NORMAL
PATH REPORT.FINAL DX SPEC: NORMAL
PATH REPORT.GROSS SPEC: NORMAL

## 2025-03-25 NOTE — OUTREACH NOTE
Prep Survey      Flowsheet Row Responses   [facility] facility patient discharged from? [facility]   Is LACE score < 7 ? No   Eligibility Readm Mgmt   Discharge diagnosis SBO (small bowel obstruction)   Does the patient have one of the following disease processes/diagnoses(primary or secondary)? Other   Does the patient have Home health ordered? Yes   What is the Home health agency?  [facility]   Is there a DME ordered? No   Prep survey completed? Yes            IWONA FLORENTINO - Registered Nurse

## 2025-03-26 LAB
BACTERIA SPEC AEROBE CULT: NORMAL
BACTERIA SPEC AEROBE CULT: NORMAL

## 2025-04-02 ENCOUNTER — OFFICE VISIT (OUTPATIENT)
Dept: ENDOCRINOLOGY | Facility: CLINIC | Age: 65
End: 2025-04-02
Payer: MEDICARE

## 2025-04-02 VITALS
HEIGHT: 72 IN | DIASTOLIC BLOOD PRESSURE: 72 MMHG | BODY MASS INDEX: 32.64 KG/M2 | OXYGEN SATURATION: 98 % | HEART RATE: 82 BPM | SYSTOLIC BLOOD PRESSURE: 132 MMHG | WEIGHT: 241 LBS

## 2025-04-02 DIAGNOSIS — E78.2 MIXED HYPERLIPIDEMIA: ICD-10-CM

## 2025-04-02 DIAGNOSIS — E11.8 TYPE 2 DIABETES MELLITUS WITH COMPLICATION, WITHOUT LONG-TERM CURRENT USE OF INSULIN: Primary | ICD-10-CM

## 2025-04-02 LAB
EXPIRATION DATE: NORMAL
GLUCOSE BLDC GLUCOMTR-MCNC: 121 MG/DL (ref 70–130)
Lab: NORMAL

## 2025-04-02 PROCEDURE — 82947 ASSAY GLUCOSE BLOOD QUANT: CPT | Performed by: INTERNAL MEDICINE

## 2025-04-02 PROCEDURE — 99214 OFFICE O/P EST MOD 30 MIN: CPT | Performed by: INTERNAL MEDICINE

## 2025-04-02 PROCEDURE — 3078F DIAST BP <80 MM HG: CPT | Performed by: INTERNAL MEDICINE

## 2025-04-02 PROCEDURE — 3075F SYST BP GE 130 - 139MM HG: CPT | Performed by: INTERNAL MEDICINE

## 2025-04-02 PROCEDURE — 3044F HG A1C LEVEL LT 7.0%: CPT | Performed by: INTERNAL MEDICINE

## 2025-04-02 RX ORDER — MIRABEGRON 25 MG/1
1 TABLET, FILM COATED, EXTENDED RELEASE ORAL DAILY
COMMUNITY

## 2025-04-02 NOTE — PROGRESS NOTES
No chief complaint on file.      HPI:   Gaetano Marcelo is a 65 y.o.male who [facility]u evaluation and management of his Type 2 diabetes. Last visit 08/21/2024. His history is as follows:    Interim Events:  - had developed chronic back pain after osteomyelitis in 06/2024. Is going to pain management clinic and is now on Hydrocodone Bitartrate/Ac 325MG/5MG.  - (03/21/2025) s/p right inguinal hernia repair for incarcerated hernia  - Has had recent vascular surgery.   - Has had to take Ozempic intermittently due to his recent surgeries. Has been taking a lower dose of Ozempic 0.5 mg weekly through samples      1) Type 2 DM with h/o retinopathy, PVD, peripheral neuropathy:   - Diagnosed with diabetes at age 34  - s/p Michelle-en-y gastric in 2004. Required diabetic medications after surgery.   - Pt had been taking metformin for years. In 2013, was hospitalized with sepsis, LAURY due to gangrene of the right 1st, 2nd toes after procedure with podiatry. Pt required dialysis during that admission. Metformin was temporarily decreased during that admission.  FMHx: Parents had DM    Current DM Medications: Pt tolerating DM medications  - Metformin  mg tab: 1 tab (500 mg) BID  - Ozempic 0.5 mg weekly: overall tolerating  ( Had been taking Ozempic: 1.0 mg weekly and was getting through [employer] PAP)    Glucometer Review:  No meter for review today    DM Health Maintenance:  Ophtho: (03/20/2023) with Dr. Dumont ([facility]). H/O retinopathy in 2018, s/p photocoag. Stable.    Podiatry: see below  Monofilament / Foot exam: DUE  Lipids: (08/2024) Tchol 139, TG 54, HDL 71, LDL 56 - on atorvastatin 20 mg, fish oil 1000 mg daily  Urine Microalb/Cr ratio: (08/2024) <1.2,  normal  TSH: (08/2024) 3.800   CBC: (03/2025) Hgb 12.3 (13.0 - 17.7)  CMP: (03/2025) Cr 0.67, eGFR 103, LFTs WNL  Vit B12: (08/2024) > 2000 - pt is on a Vit B12 supplement  ASA: 81 mg daily, prescribed by another provider    2)  essential HTN: on clonidine 0.1 mg BID, Hydralazine 25 mg BID, Metoprolol  mg daily    3) chronic back pain, peripheral neuropathy: on gabapentin 600 mg BID, amitriptyline 25 mg nightly, Vit B1 200 mg daily, Hydrocodone Bitartrate/Ac 325MG/5MG prescribed by another provider      Other History:  1) s/p right 1st, 2nd toe amputation in 2013: was hospitalized with sepsis, LAURY due to gangrene of the Right toes after procedure with podiatry. Pt required dialysis during that admission. 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 Has mild PTSD from that hospitalization. Takes Elavil qhs to help with sleep.   - Had a non-healing ulceration on his left 3rd toe. Developed subacute osteomyelitis. S/P angioplasty of the left posterior tibial artery and left plantar artery on 03/27/2023. S/P 3rd toe amputation on 03/28/2023. Was treated with IV Abx throough PICC line.   - In (04/2023), developed PICC line associated Right upper extremity DVT. Noted to have elevated LFTs during admission thought to be drug  induced.   - pt with h/o chronic osteomyelitis of his L4-S1 vertebrae. Was admitted to [facility] in 06/2024 for IV Abx. Pt underwent L4-5 and L5-S1 laminectomy and instrumented posterolateral pedicle screw arthrodesis, Debridement of L5-S1 disk space/diskitis on 06/17/2024.    - had developed chronic back pain after osteomyelitis in 06/2024. Is going to pain management clinic and is now on Hydrocodone Bitartrate/Ac 325MG/5MG.  - (03/21/2025) s/p right inguinal hernia repair for incarcerated hernia    Review of Systems   Constitutional:  Positive for fatigue (intermittent).        Wt stable   HENT: Negative.  Negative for dental problem.    Eyes: Negative.    Respiratory:  Positive for shortness of breath (with exertion).    Cardiovascular: Negative.    Gastrointestinal: Negative.  Negative for constipation, diarrhea and nausea.   Endocrine: Negative.         Denies hypoglycemia   Genitourinary: Negative.   "  Musculoskeletal:  Positive for arthralgias and back pain.   Skin:  Negative for wound (amputation site C/D/I).   Allergic/Immunologic: Negative.    Neurological:  Positive for numbness (tingling, in hands). Negative for dizziness.   Hematological: Negative.    Psychiatric/Behavioral:  Positive for sleep disturbance.      The following portions of the patient's history were reviewed and updated as appropriate: allergies, current medications, past family history, past medical history, past social history, past surgical history and problem list.    /72 (BP Location: Right arm, Patient Position: Sitting, Cuff Size: Adult)   Pulse 82   Ht 182.9 cm (72\")   Wt 109 kg (241 lb)   SpO2 98%   BMI 32.69 kg/m²   Physical Exam  Vitals reviewed.   Constitutional:       General: He is not in acute distress.     Appearance: Normal appearance. He is well-developed. He is not ill-appearing or diaphoretic.   HENT:      Head: Normocephalic.      Mouth/Throat:      Mouth: Mucous membranes are moist.      Pharynx: Oropharynx is clear.   Eyes:      Conjunctiva/sclera: Conjunctivae normal.      Pupils: Pupils are equal, round, and reactive to light.   Neck:      Thyroid: No thyromegaly.      Trachea: No tracheal deviation.      Comments: No palpable thyroid nodules    Cardiovascular:      Rate and Rhythm: Normal rate and regular rhythm.      Heart sounds: Normal heart sounds. No murmur heard.  Pulmonary:      Effort: Pulmonary effort is normal. No respiratory distress.      Breath sounds: Normal breath sounds.   Abdominal:      General: Bowel sounds are normal.      Palpations: Abdomen is soft. There is no mass.      Tenderness: There is no abdominal tenderness.      Comments: No scarring at GLP-1 injection sites     Musculoskeletal:      Cervical back: Neck supple.      Comments: Walking with cane   Lymphadenopathy:      Cervical: No cervical adenopathy.   Skin:     General: Skin is warm and dry.      Findings: No erythema or " lesion.   Neurological:      Mental Status: He is alert and oriented to person, place, and time.      Cranial Nerves: No cranial nerve deficit.   Psychiatric:         Behavior: Behavior normal.       LABS/IMAGING: outside records reviewed and summarized in HPI  Results for orders placed or performed in visit on 04/02/25   POC Glucose, Blood    Collection Time: 04/02/25 12:06 PM    Specimen: Blood   Result Value Ref Range    Glucose 121 70 - 130 mg/dL    Lot Number [id number]     Expiration Date 11/30/2025      Lab Results   Component Value Date    HGBA1C 5.60 03/19/2025         ASSESSMENT/PLAN:    1) Type 2 DM with h/o retinopathy, PVD, peripheral neuropathy: controlled, Hgb A1C% today is 5.60 in the setting of mild anemia. Denies hypoglycemia.     - Decrease metformin  mg back to once daily.  - Restart Ozempic at lower dose 0.5 mg weekly. Will send application for Ozempic 1 mg weekly to [employer] [facility].   Advised pt to contact me if he has any GI symptoms or if he develops hypoglycemia (BG < 80)    DM health maintenance labs due next visit    2) mixed hyperlipidemia: controlled  Lipids: (08/2024) Tchol 139, TG 54, HDL 71, LDL 56 - on atorvastatin 20 mg, fish oil 1000 mg daily    RTC 6 months    Electronically Signed: Genie Talbot MD

## 2025-04-04 ENCOUNTER — READMISSION MANAGEMENT (OUTPATIENT)
Dept: CALL CENTER | Facility: HOSPITAL | Age: 65
End: 2025-04-04
Payer: MEDICARE

## 2025-04-04 NOTE — OUTREACH NOTE
Medical Week 2 Survey      Flowsheet Row Responses   [facility] [place] patient discharged from? [facility]   Does the patient have one of the following disease processes/diagnoses(primary or secondary)? Other   Week 2 attempt successful? Yes   Call start time 1120   Discharge diagnosis SBO (small bowel obstruction)   Call end time 1122   Is patient permission given to speak with other caregiver? Yes   List who call center can speak with Dtr   Person spoke with today (if not patient) and relationship Dtr   Meds reviewed with patient/caregiver? Yes   Is the patient having any side effects they believe may be caused by any medication additions or changes? No   Does the patient have all medications ordered at discharge? Yes   Is the patient taking all medications as directed (includes completed medication regime)? Yes   Does the patient have a primary care provider?  Yes   Does the patient have an appointment with their PCP within 7 days of discharge? No   What is preventing the patient from scheduling follow up appointments within 7 days of discharge? Haven't had time   Nursing Interventions Educated patient on importance of making appointment, Advised patient to make appointment   Has the patient kept scheduled appointments due by today? N/A   Has home health visited the patient within 72 hours of discharge? Unsure   Psychosocial issues? No   Did the patient receive a copy of their discharge instructions? Yes   Nursing interventions Reviewed instructions with patient   What is the patient's perception of their health status since discharge? Improving   Is the patient/caregiver able to teach back the hierarchy of who to call/visit for symptoms/problems? PCP, Specialist, Home health nurse, Urgent Care, ED, 911 Yes   Week 2 Call Completed? Yes   Is the patient interested in additional calls from an ambulatory ? No   Would this patient benefit from a Referral to Amb Social Work? No   Wrap up additional comments  Dtr reports patient doing well, discussed S/S of infection.   Call end time 1122            [place] T - Registered Nurse

## 2025-04-14 ENCOUNTER — READMISSION MANAGEMENT (OUTPATIENT)
Dept: CALL CENTER | Facility: HOSPITAL | Age: 65
End: 2025-04-14
Payer: MEDICARE

## 2025-04-14 NOTE — OUTREACH NOTE
Medical Week 3 Survey      Flowsheet Row Responses   [facility] patient discharged from? [facility]   Does the patient have one of the following disease processes/diagnoses(primary or secondary)? Other   Week 3 attempt successful? No   Unsuccessful attempts Attempt 1   Sharon Villeda Registered Nurse

## 2025-05-01 ENCOUNTER — TELEPHONE (OUTPATIENT)
Dept: ENDOCRINOLOGY | Facility: CLINIC | Age: 65
End: 2025-05-01

## 2025-05-01 NOTE — TELEPHONE ENCOUNTER
Hub staff attempted to follow warm transfer process and was unsuccessful     Caller: JOSSUE BELTRE    Relationship to patient: Emergency Contact    Best call back number: [phone]    Patient is needing: PAPERWORK WORK FOR OZEMPIC PAP PROGRAM WAS DROPPED OFF A MONTH AGO AND STILL HAS NOT HEARD BACK FROM THE OFFICE. CALLED THE OZIC PAP PROGRAM HAD NOT RECEIVED ANYTHING FROM THE OFFICE IN OVER A YEAR. ASKED THAT WE START THE PAPERWORK  TODAY AND SEND IT TO THE PAP OZEMPIC  PROGRAM

## 2025-05-01 NOTE — TELEPHONE ENCOUNTER
Caller: JOSSUE BELTRE    Relationship to patient: Emergency Contact    Best call back number: [phone]    Patient is needing: PATIENT IS CALLING SANDOR BACK, PLEASE ADVISE

## 2025-05-16 ENCOUNTER — TELEPHONE (OUTPATIENT)
Dept: ENDOCRINOLOGY | Facility: CLINIC | Age: 65
End: 2025-05-16
Payer: MEDICARE

## 2025-05-16 NOTE — TELEPHONE ENCOUNTER
Pt was called, no answer. I left a message to inform the pt that the ozempic is here a and ready for .

## 2025-05-26 DIAGNOSIS — E11.8 TYPE 2 DIABETES MELLITUS WITH COMPLICATION, WITHOUT LONG-TERM CURRENT USE OF INSULIN: ICD-10-CM

## 2025-05-27 RX ORDER — METFORMIN HYDROCHLORIDE 500 MG/1
500 TABLET, EXTENDED RELEASE ORAL DAILY
Qty: 90 TABLET | Refills: 3 | Status: SHIPPED | OUTPATIENT
Start: 2025-05-27

## 2025-05-27 NOTE — TELEPHONE ENCOUNTER
Rx Refill Note  Requested Prescriptions     Pending Prescriptions Disp Refills    metFORMIN ER (GLUCOPHAGE-XR) 500 MG 24 hr tablet [Pharmacy Med Name: METFORMIN HCL ER TABS 500MG] 180 tablet 3     Sig: TAKE 1 TABLET TWICE A DAY      Last office visit with prescribing clinician: 4/2/2025   Last telemedicine visit with prescribing clinician: Visit date not found   Next office visit with prescribing clinician: 10/6/2025     04/02/2025  ASSESSMENT/PLAN:     1) Type 2 DM with h/o retinopathy, PVD, peripheral neuropathy: controlled, Hgb A1C% today is 5.60 in the setting of mild anemia. Denies hypoglycemia.      - Decrease metformin  mg back to once daily.    Jennifer Da Silva MA  05/27/25, 09:22 EDT

## 2025-06-07 NOTE — PROGRESS NOTES
"Patient Name:  Gaetano Marcelo  YOB: 1960  [id number]    Surgery Progress Note    Date of visit: 3/22/2025      Subjective: No events overnight.  Reports some pain in the right groin that is slightly worse this morning.  Has passed flatus this morning.  Tolerating clear liquids with no difficulty.          Objective:     /92 (BP Location: Right arm, Patient Position: Lying)   Pulse 89   Temp 98 °F (36.7 °C) (Oral)   Resp 20   Ht 182.9 cm (72\")   Wt 105 kg (232 lb)   SpO2 91%   BMI 31.46 kg/m²     Intake/Output Summary (Last 24 hours) at 3/22/2025 1055  Last data filed at 3/22/2025 0710  Gross per 24 hour   Intake 2059 ml   Output 425 ml   Net 1634 ml       GEN:   Awake, alert, in no acute distress, resting comfortably in bed   CV:   Regular rate and rhythm  L:  Symmetric expansion, not labored on room air  Abd:  Soft, obese, nondistended, bandage in place to the right groin incision site which is clean and dry without significant swelling  Ext:  No cyanosis, clubbing, or edema    Recent labs that are back at this time have been reviewed.           Assessment/ Plan:    Mr. Marcelo is a 64-year-old gentleman who was admitted due to concern for an incarcerated right inguinal hernia that was ultimately reduced in the emergency department and he underwent open right inguinal hernia repair with mesh placement by Dr. Naranjo on March 21    # Right inguinal hernia  -Postop day 1 following open right inguinal hernia repair with Dr. Naranjo  -Advance to regular diet  -Continue oral pain meds  -Scheduled bowel regimen  -Clinically doing well      Jake Stack MD  3/22/2025  10:55 EDT      " Physical Therapy    Visit Type: treatment and initial evaluation  SUBJECTIVE  \"I am in [place]\"     Pain     Location: head    Pain severity now: 14.     OBJECTIVE     Cognitive Status   Orientation    - Oriented to: person   - Disoriented to: place, time and situation  Functional Communication   - Overall Communication Status: impaired   - Forms of Communication: delayed responses  Attention Span    - Attention: impaired - difficulty attending to directions - difficulty dividing attention   - Attention impairment: distractibility, fatigue, impulsivity, perseveration and reduced memory  Following Direction   - follows one step commands inconsistently  Transition Between Tasks   - transitions with cues  Memory   - - decreased long term memory - decreased short term memory - decreased working memory  Safety Awareness/Insight   - decreased awareness of need for assistance  Awareness of Deficits   - decreased awareness of deficits    Cognitive and Performance Based Tests   AGITATED BEHAVIOR SCALE    Observation Environment: [facility]  Rater/Disc: Bette Cuevas PT     Period of Observation:  From: 1300 6/7/25  To: 1400 6/7/25    Use the following numerical values and criteria for your ratings.  1 = absent: the behavior is note present.  2 = present to a slight degree: the behavior is present but does not prevent the conduct of other, contextually appropriate behavior. (The individual may redirect spontaneously, or the continuation of the agitated behavior does not disrupt appropriate behavior.)  3 =  present to a moderate degree: the individual needs to be redirected from an agitated to an appropriate behavior, but benefits from such cueing.  4 = present to an extreme degree: the individual is not able to engage in appropriate behavior due to the interference of the agitated behavior, even when external cueing or redirection is provided.    Short attention span, easy distractibility, inability to concentrate. 3 = present  to a moderate degree  Impulsive, impatient, low tolerance for pain or frustration. 2 = present to a slight degree  Uncooperative, resistant to care, demanding. 2 = present to a slight degree  Violent and or threatening violence toward people or property. 1 = absent  Explosive and/or unpredictable anger. 1 = absent  Rocking, rubbing, moaning, or other self-stimulating behavior. 2 = present to a slight degree  Pulling at tubes, restraints, etc. 1 = absent  Wandering from treatment areas. 1 = absent  Restlessness, pacing, excessive movement. 1 = absent  Repetitive behaviors, motor and / or verbal. 2 = present to a slight degree  Rapid, loud or excessive talking. 1 = absent  Sudden changes of mood. 2 = present to a slight degree  Easily initiated or excessive crying and / or laughter. 1 = absent  Self- abusiveness, physical and / or verbal. 1 = absent    Total Score: 21        Vitals:  Blood Pressure (mmhg):      - Seated: 131/74, 110/70, 118/68, asymptomatic  Patient's HR during rest and activity ranging from 104-115 during session, nurse aware of all vitals       Strength  (out of 5 unless noted, standard test position unless noted)   WFL: LLE, RLE, except as noted  Knee:    - Extension:         Right: 3+, pain    Sitting Balance  (NIEVES = base of support)  Static      - Trial 1 (sec): 60     - Trial 1 details: with single UE support, with back unsupported and stand by assist    Standing Balance  (NIEVES = base of support)  Firm Surface: Double Leg      - Static, Eyes Open       - Trial 1 (sec): 45       - Trial 1 details: contact guard and without UE support      Coordination  LLE:     - Heel-Shin: intact  RLE:     - Heel-Shin: intact    Sensation/Dermatome Testing:    Decreased command following, unable to achieve true sensation or proprioception assessment    Bed Mobility  - Rolling left: stand by assist  - Rolling right: stand by assist  - Repositioning in bed: moderate assist  - Side-lying to sit: moderate assist (at  trunk)  - Sit to supine: moderate assist (for BLE)  Transfers  Assistive devices: gait belt  Description: BUE use and poor eccentric control  - Sit to stand: minimal assist  - Stand to sit: minimal assist  - Stand pivot: minimal assist  - Car: minimal assist, with verbal cues, with tactile cues  Patient required cueing for safety measures due to unsafe choice of how to get in/out of car    Ambulation / Gait  - Assistive device: gait belt  - Distance (feet unless otherwise indicated): 15, 50 (x2)  - Assist Level: minimal assist  - Surface: even  - Description: steppage, decreased azam/pace, decreased step length left, decreased step length right and unsteady    Ambulation / Gait (additional trials)  - Assist Level: contact guard/touching/steadying assist  - Assistive device: gait belt and 2-wheeled walker  - Distance (feet unless otherwise indicated): 50 (x2); 115  - Description: decreased step length right, decreased step length left, decreased stance time LLE and heavy reliance on BUE  - Surface: even and incline/decline    Stair Ambulation  - Number of steps: 4  - Step Height (inches): 6  Ascend  - Assist Level: contact guard  - Pattern: reciprocal  - Railing: bilateral  - Assistive Device: gait belt  - Description: decreased speed  Descend   - Assist Level: contact guard  - Pattern: step-to  - Railing: bilateral  - Assistive Device: gait belt  - Description: decreased speed and decreased eccentric control      Interventions  Skilled input: verbal instruction/cues, tactile instruction/cues, posture correction, facilitation and inhibition  Verbal Consent: Writer verbally educated and received verbal consent for hand placement, positioning of patient, and techniques to be performed today from patient for clothing adjustments for techniques, hand placement and palpation for techniques and therapist position for techniques as described above and how they are pertinent to the patient's plan of care.          Education:   - Present and ready to learn: patient and patient's family  Education provided during session:  - Patient was educated on role of physical therapy in rehabilitation process and schedule for daily therapy. Patient was also educated on findings from evaluation and recommendations for use of walker due to balance deficits   Patient's niece was present at end of session and writer educated her on inpatient rehabilitation, patient's high HR from the morning and goals of physical, occupational and speech therapy   - role of PT, safety and role of OT  - Results of above outlined education: Verbalizes understanding and Demonstrates understanding    ASSESSMENT   Patient will benefit from skilled therapy to address listed impairments and functional limitations.  Impairments: pain, ROM, safety awareness, coordination, balance, executive functioning, cognitive, activity tolerance, strength and proprioception  Functional Limitations: bed mobility, wheelchair mobility, ambulation, stairs, transfers and community mobility  Interfering Components: cognitive deficits and decreased insight into deficits  Patient is an 81 y/o female who presents to P PT services s/p fall with TBI per physician H&P. Patients prior level of function was independent with all functional mobility and IADLs. Patient lives alone in an apartment, plan is to discharge to [facility] home at discharge, with 24/7 support pieced together from family and neighbors. Currently, patient is presenting with impairments in cognition, balance, strength, motivation, activity tolerance and pain. Due to patient's current impairments, they are limited in all functional mobility compared to baseline. Patient requires participation in skilled intensive physical therapy services to address listed impairments and meet therapy goals in order to discharge home safely. Goals are set for household supervision level with estimated length of stay 3 weeks.           Discharge Recommendations  Recommendation for Discharge: PT IL: Patient requires 24 HOUR assistance to perform mobility and/or ADLs safely, Patient is appropriate for Physical Therapy 1-3 times per week  PT/OT Mobility Equipment for Discharge: potential two-wheeled walker and wheelchair for community  PT/OT ADL Equipment for Discharge: tub transfer bench, grab bars, commode  PT Identified Barriers to Discharge: cognition, balance          Progress: progressing toward goals      Predicted patient presentation: Low (stable) - Patient comorbidities and complexities, as defined above, will have little effect on progress for prescribed plan of care.    Therapy Participation: This patient participated in all scheduled physical therapy time this session.    PLAN   Suggestions for next session as indicated: PT Frequency: 5 days/week, 6 days/week   Frequency Comments: 60-90 min PN SAT   Duration: estimated length of stay 17 days     Interventions: balance, cognitive reorientation/training, equipment eval/education, continued evaluation, HEP train/position, patient/family training, safety education, stairs retraining, functional transfer training, endurance training, community reintegration, bed mobility, body mechanics, compensatory technique education, energy conservation, gait training, ROM and strengthening  Agreement to plan and goals: patient agrees with goals and treatment plan      GOALS  Review date: 6/14/2025  Short Term Goals (STGs): to be met 7 days from date established, unless otherwise stated.   - Patient will complete all bed mobility at minimal assist level   - Patient will perform sit to/from stand at stand by assist level   - Patient will perform stand pivot transfers at stand by assist level with least restrictive assistive device   - Patient will ambulate 100 feet at stand by assist level with least restrictive assistive device   - Patient will negotiate 10 stairs at contact guard assist level with  least restrictive assistive device and 1 railings  Long Term Goals (LTGs): to be met by discharge from rehab program.   - Patient will complete all bed mobility at modified Independent level   - Patient will perform sit to/from stand at supervision level   - Patient will perform stand pivot transfers at supervision level with least restrictive assistive device   - Patient will ambulate 100 feet at supervision level with least restrictive assistive device   - Patient will negotiate 15 stairs at stand by assist level with least restrictive assistive device and 1 railings  Documented in the chart in the following areas:  Prior Function. Assessment/Plan.     Patient at End of Session:   Location: in bed  Safety measures: alarm system in place/re-engaged and call light within reach  Handoff to: nurse and family/caregiver        Therapy procedure time and total treatment time can be found documented on the Time Entry flowsheet

## 2025-08-16 DIAGNOSIS — E11.8 TYPE 2 DIABETES MELLITUS WITH COMPLICATION, WITHOUT LONG-TERM CURRENT USE OF INSULIN: ICD-10-CM

## 2025-08-18 DIAGNOSIS — E11.8 TYPE 2 DIABETES MELLITUS WITH COMPLICATION, WITHOUT LONG-TERM CURRENT USE OF INSULIN: ICD-10-CM

## 2025-08-18 RX ORDER — BLOOD SUGAR DIAGNOSTIC
STRIP MISCELLANEOUS
Qty: 100 EACH | Refills: 0 | Status: SHIPPED | OUTPATIENT
Start: 2025-08-18 | End: 2025-08-18

## 2025-08-18 RX ORDER — BLOOD SUGAR DIAGNOSTIC
STRIP MISCELLANEOUS
Qty: 200 EACH | Refills: 1 | Status: SHIPPED | OUTPATIENT
Start: 2025-08-18

## (undated) DEVICE — MICRO HVTSA, 0.5G AND HVTSA SOURCEMARK PRODUCT CODE M1206 AND M1206-01: Brand: EXOFIN MICRO HVTSA, 0.5G

## (undated) DEVICE — SUT VIC 0 SH 27IN J418H

## (undated) DEVICE — TRAP FLD MINIVAC MEGADYNE 100ML

## (undated) DEVICE — MONOFILAMENT POLYBUTESTER: Brand: NOVAFIL

## (undated) DEVICE — GLV SURG PREMIERPRO MIC LTX PF SZ8 BRN

## (undated) DEVICE — ANTIBACTERIAL UNDYED BRAIDED (POLYGLACTIN 910), SYNTHETIC ABSORBABLE SUTURE: Brand: COATED VICRYL

## (undated) DEVICE — DRSNG SURESITE WNDW 4X4.5

## (undated) DEVICE — 3M™ IOBAN™ 2 ANTIMICROBIAL INCISE DRAPE 6650EZ: Brand: IOBAN™ 2

## (undated) DEVICE — SUT MNCRYL PLS ANTIB UD 3/0 PS2 27IN

## (undated) DEVICE — PK MINOR SPLT 10

## (undated) DEVICE — SPNG GZ WOVN 4X4IN 12PLY 10/BX STRL

## (undated) DEVICE — GOWN,SIRUS,NONRNF,SETINSLV,XL,20/CS: Brand: MEDLINE

## (undated) DEVICE — GLV SURG PREMIERPRO MIC LTX PF SZ7.5 BRN

## (undated) DEVICE — APPL CHLORAPREP TINTED 26ML TEAL

## (undated) DEVICE — ADHS LIQ MASTISOL 2/3ML

## (undated) DEVICE — PATIENT RETURN ELECTRODE, SINGLE-USE, CONTACT QUALITY MONITORING, ADULT, WITH 9FT CORD, FOR PATIENTS WEIGING OVER 33LBS. (15KG): Brand: MEGADYNE

## (undated) DEVICE — DRN PENRS SIL 1/4X18IN LF STRL

## (undated) DEVICE — SUT SILK 3/0 TIES 18IN A184H

## (undated) DEVICE — PENCL SMOKE/EVAC MEGADYNE TELESCP 10FT